# Patient Record
Sex: FEMALE | Race: WHITE
[De-identification: names, ages, dates, MRNs, and addresses within clinical notes are randomized per-mention and may not be internally consistent; named-entity substitution may affect disease eponyms.]

---

## 2018-07-10 ENCOUNTER — HOSPITAL ENCOUNTER (OUTPATIENT)
Dept: HOSPITAL 53 - M LAB | Age: 28
End: 2018-07-10
Attending: GENERAL PRACTICE
Payer: COMMERCIAL

## 2018-07-10 DIAGNOSIS — E86.0: Primary | ICD-10-CM

## 2018-07-10 LAB
25(OH)D3 SERPL-MCNC: 28.6 NG/ML (ref 30–100)
ALBUMIN/GLOBULIN RATIO: 1.09 (ref 1–1.93)
ALBUMIN: 3.5 GM/DL (ref 3.2–5.2)
ALKALINE PHOSPHATASE: 80 U/L (ref 45–117)
ALT SERPL W P-5'-P-CCNC: 83 U/L (ref 12–78)
AMORPH SED URNS QL MICRO: (no result)
AMYLASE SERPL-CCNC: 118 U/L (ref 25–115)
ANION GAP: 5 MEQ/L (ref 8–16)
APPEARANCE, URINE: (no result)
AST SERPL-CCNC: 26 U/L (ref 7–37)
BACTERIA UR QL AUTO: (no result)
BILIRUBIN, URINE AUTO: NEGATIVE
BILIRUBIN,TOTAL: 0.4 MG/DL (ref 0.2–1)
BLOOD UREA NITROGEN: 34 MG/DL (ref 7–18)
BLOOD, URINE BLOOD: NEGATIVE
CALCIUM LEVEL: 10.4 MG/DL (ref 8.5–10.1)
CARBON DIOXIDE LEVEL: 37 MEQ/L (ref 21–32)
CHLORIDE LEVEL: 92 MEQ/L (ref 98–107)
CHOLEST SERPL-MCNC: 185 MG/DL (ref ?–200)
CHOLESTEROL RISK RATIO: 2.72 (ref ?–5)
CONTROL LINE HCG: (no result)
CREATININE FOR GFR: 1.99 MG/DL (ref 0.55–1.3)
FREE T3: 2.1 PG/ML (ref 2.2–4)
FREE T4: 1.08 NG/DL (ref 0.76–1.46)
GFR SERPL CREATININE-BSD FRML MDRD: 32 ML/MIN/{1.73_M2} (ref 60–?)
GLUCOSE, FASTING: 298 MG/DL (ref 70–100)
GLUCOSE, URINE (UA) AUTO: (no result) MG/DL
HCG, SERUM QUALITATIVE: NEGATIVE
HDLC SERPL-MCNC: 68 MG/DL (ref 40–?)
HEMATOCRIT: 27.9 % (ref 36–47)
HEMOGLOBIN: 9.7 G/DL (ref 12–15.5)
KETONE, URINE AUTO: NEGATIVE MG/DL
LDL CHOLESTEROL: 94 MG/DL (ref ?–100)
LEUKOCYTE ESTERASE UR QL STRIP.AUTO: (no result)
MAGNESIUM LEVEL: 1.8 MG/DL (ref 1.8–2.4)
MEAN CORPUSCULAR HEMOGLOBIN: 31.2 PG (ref 27–33)
MEAN CORPUSCULAR HGB CONC: 34.8 G/DL (ref 32–36.5)
MEAN CORPUSCULAR VOLUME: 89.7 FL (ref 80–96)
NITRITE, URINE AUTO: POSITIVE
NONHDLC SERPL-MCNC: 117 MG/DL
NRBC BLD AUTO-RTO: 0 % (ref 0–0)
PH,URINE: 8 UNITS (ref 5–9)
PHOSPHORUS LEVEL: 5.2 MG/DL (ref 2.5–4.9)
PLATELET COUNT, AUTOMATED: 381 10^3/UL (ref 150–450)
POTASSIUM SERUM: 4.4 MEQ/L (ref 3.5–5.1)
PROT UR QL STRIP.AUTO: NEGATIVE MG/DL
RBC, URINE AUTO: 67 /HPF (ref 0–3)
RED BLOOD COUNT: 3.11 10^6/UL (ref 4–5.4)
RED CELL DISTRIBUTION WIDTH: 11.2 % (ref 11.5–14.5)
SODIUM LEVEL: 134 MEQ/L (ref 136–145)
SPECIFIC GRAVITY URINE AUTO: 1.01 (ref 1–1.03)
SQUAMOUS #/AREA URNS AUTO: 0 /HPF (ref 0–6)
THYROID STIMULATING HORMONE: 3.63 UIU/ML (ref 0.36–3.74)
TOTAL PROTEIN: 6.7 GM/DL (ref 6.4–8.2)
TRIGLYCERIDES LEVEL: 115 MG/DL (ref ?–150)
UROBILINOGEN, URINE AUTO: 0.2 MG/DL (ref 0–2)
WBC, URINE AUTO: 147 /HPF (ref 0–3)
WHITE BLOOD COUNT: 6.4 10^3/UL (ref 4–10)

## 2018-07-10 PROCEDURE — 93005 ELECTROCARDIOGRAM TRACING: CPT

## 2018-07-16 LAB — SUMMARY: (no result)

## 2018-09-21 ENCOUNTER — HOSPITAL ENCOUNTER (OUTPATIENT)
Dept: HOSPITAL 53 - M SFHCPLAZ | Age: 28
End: 2018-09-21
Attending: NURSE PRACTITIONER
Payer: COMMERCIAL

## 2018-09-21 DIAGNOSIS — D64.9: ICD-10-CM

## 2018-09-21 DIAGNOSIS — M06.9: Primary | ICD-10-CM

## 2018-09-21 DIAGNOSIS — E10.65: ICD-10-CM

## 2018-09-21 LAB
ALBUMIN/GLOBULIN RATIO: 1.06 (ref 1–1.93)
ALBUMIN: 3.6 GM/DL (ref 3.2–5.2)
ALKALINE PHOSPHATASE: 83 U/L (ref 45–117)
ALT SERPL W P-5'-P-CCNC: 54 U/L (ref 12–78)
ANION GAP: 10 MEQ/L (ref 8–16)
AST SERPL-CCNC: 26 U/L (ref 7–37)
BILIRUBIN,TOTAL: 0.3 MG/DL (ref 0.2–1)
BLOOD UREA NITROGEN: 21 MG/DL (ref 7–18)
CALCIUM LEVEL: 9.1 MG/DL (ref 8.5–10.1)
CARBON DIOXIDE LEVEL: 33 MEQ/L (ref 21–32)
CHLORIDE LEVEL: 93 MEQ/L (ref 98–107)
CREAT UR-MCNC: 44.2 MG/DL
CREATININE FOR GFR: 2.22 MG/DL (ref 0.55–1.3)
EST. AVERAGE GLUCOSE BLD GHB EST-MCNC: 352 MG/DL (ref 60–110)
FERRITIN: 47 NG/ML (ref 8–252)
GFR SERPL CREATININE-BSD FRML MDRD: 28.2 ML/MIN/{1.73_M2} (ref 60–?)
GLUCOSE, FASTING: 192 MG/DL (ref 70–100)
HEMATOCRIT: 26.4 % (ref 36–47)
HEMOGLOBIN: 9.1 G/DL (ref 12–15.5)
IRON (FE): 28 UG/DL (ref 50–170)
IRON SATN MFR SERPL: 9.2 % (ref 13.2–45)
MEAN CORPUSCULAR HEMOGLOBIN: 31.9 PG (ref 27–33)
MEAN CORPUSCULAR HGB CONC: 34.5 G/DL (ref 32–36.5)
MEAN CORPUSCULAR VOLUME: 92.6 FL (ref 80–96)
MICROALBUMIN UR-MCNC: 32.5 MG/L
MICROALBUMIN/CREAT UR: 73.5 MCG/MG (ref 0–30)
NRBC BLD AUTO-RTO: 0 % (ref 0–0)
PLATELET COUNT, AUTOMATED: 406 10^3/UL (ref 150–450)
POTASSIUM SERUM: 4.7 MEQ/L (ref 3.5–5.1)
RED BLOOD COUNT: 2.85 10^6/UL (ref 4–5.4)
RED CELL DISTRIBUTION WIDTH: 12.4 % (ref 11.5–14.5)
RHEUMATOID FACTOR QUANT: 37.1 IU/ML (ref ?–15)
SODIUM LEVEL: 136 MEQ/L (ref 136–145)
TOTAL IRON BINDING CAPACITY: 303 UG/DL (ref 250–450)
TOTAL PROTEIN: 7 GM/DL (ref 6.4–8.2)
WHITE BLOOD COUNT: 7 10^3/UL (ref 4–10)

## 2018-10-08 ENCOUNTER — HOSPITAL ENCOUNTER (OUTPATIENT)
Dept: HOSPITAL 53 - M RAD | Age: 28
End: 2018-10-08
Attending: INTERNAL MEDICINE
Payer: COMMERCIAL

## 2018-10-08 DIAGNOSIS — N18.4: Primary | ICD-10-CM

## 2018-10-08 PROCEDURE — 76775 US EXAM ABDO BACK WALL LIM: CPT

## 2018-10-26 ENCOUNTER — HOSPITAL ENCOUNTER (OUTPATIENT)
Dept: HOSPITAL 53 - M SFHCPLAZ | Age: 28
End: 2018-10-26
Attending: NURSE PRACTITIONER
Payer: COMMERCIAL

## 2018-10-26 ENCOUNTER — HOSPITAL ENCOUNTER (OUTPATIENT)
Dept: HOSPITAL 53 - M LAB REF | Age: 28
End: 2018-10-26
Attending: NURSE PRACTITIONER
Payer: COMMERCIAL

## 2018-10-26 DIAGNOSIS — R19.7: Primary | ICD-10-CM

## 2018-10-26 LAB
ANION GAP: 10 MEQ/L (ref 8–16)
BLOOD UREA NITROGEN: 22 MG/DL (ref 7–18)
CALCIUM LEVEL: 10.2 MG/DL (ref 8.5–10.1)
CARBON DIOXIDE LEVEL: 35 MEQ/L (ref 21–32)
CHLORIDE LEVEL: 92 MEQ/L (ref 98–107)
CREATININE FOR GFR: 2.56 MG/DL (ref 0.55–1.3)
GFR SERPL CREATININE-BSD FRML MDRD: 23.9 ML/MIN/{1.73_M2} (ref 60–?)
GLUCOSE, FASTING: 267 MG/DL (ref 70–100)
POTASSIUM SERUM: 4.1 MEQ/L (ref 3.5–5.1)
SODIUM LEVEL: 137 MEQ/L (ref 136–145)

## 2018-10-26 PROCEDURE — 86255 FLUORESCENT ANTIBODY SCREEN: CPT

## 2018-10-26 PROCEDURE — 87507 IADNA-DNA/RNA PROBE TQ 12-25: CPT

## 2018-10-28 LAB
IMMUNOGLOBULIN A: 200 MG/DL (ref 87–352)
T-TRANSGLUTAMINASE(TTG) IGA: <2 U/ML (ref 0–3)
T-TRANSGLUTAMINASE(TTG) IGG: <2 U/ML (ref 0–5)

## 2018-11-07 ENCOUNTER — HOSPITAL ENCOUNTER (OUTPATIENT)
Dept: HOSPITAL 53 - M LRY | Age: 28
End: 2018-11-07
Attending: INTERNAL MEDICINE
Payer: COMMERCIAL

## 2018-11-07 ENCOUNTER — HOSPITAL ENCOUNTER (OUTPATIENT)
Dept: HOSPITAL 53 - M SFHCLERA | Age: 28
End: 2018-11-07
Attending: INTERNAL MEDICINE
Payer: COMMERCIAL

## 2018-11-07 DIAGNOSIS — M85.842: ICD-10-CM

## 2018-11-07 DIAGNOSIS — M05.79: Primary | ICD-10-CM

## 2018-11-07 DIAGNOSIS — M06.9: Primary | ICD-10-CM

## 2018-11-07 DIAGNOSIS — M85.841: ICD-10-CM

## 2018-11-07 DIAGNOSIS — M06.9: ICD-10-CM

## 2018-11-07 LAB
CRP SERPL-MCNC: < 0.3 MG/DL (ref 0–0.3)
ERYTHROCYTE SEDIMENTATION RATE: 81 MM/HR (ref 0–20)

## 2018-11-07 PROCEDURE — 86706 HEP B SURFACE ANTIBODY: CPT

## 2018-11-07 PROCEDURE — 73130 X-RAY EXAM OF HAND: CPT

## 2018-11-09 LAB
HCV AB SER QL: 0.1 INDEX (ref ?–0.8)
HEPATITIS B SURFACE ANTIBODY: POSITIVE
HEPATITIS B SURFACE ANTIGEN: NEGATIVE

## 2018-11-11 LAB
G6PD3: 302 (ref 146–376)
RBC # BLD AUTO: 2.84 X10E6/UL (ref 3.77–5.28)

## 2019-01-11 ENCOUNTER — HOSPITAL ENCOUNTER (OUTPATIENT)
Dept: HOSPITAL 53 - M SFHCPLAZ | Age: 29
End: 2019-01-11
Attending: NURSE PRACTITIONER
Payer: COMMERCIAL

## 2019-01-11 DIAGNOSIS — F50.2: Primary | ICD-10-CM

## 2019-01-11 LAB
ALBUMIN SERPL BCG-MCNC: 3.7 GM/DL (ref 3.2–5.2)
ALT SERPL W P-5'-P-CCNC: 39 U/L (ref 12–78)
B-HCG SERPL QL: NEGATIVE
BASOPHILS # BLD AUTO: 0.1 10^3/UL (ref 0–0.2)
BASOPHILS NFR BLD AUTO: 0.9 % (ref 0–1)
BILIRUB SERPL-MCNC: 0.3 MG/DL (ref 0.2–1)
BUN SERPL-MCNC: 22 MG/DL (ref 7–18)
C3 SERPL-MCNC: 101 MG/DL (ref 90–180)
C4 SERPL-MCNC: 24 MG/DL (ref 10–40)
CALCIUM SERPL-MCNC: 9.3 MG/DL (ref 8.5–10.1)
CHLORIDE SERPL-SCNC: 93 MEQ/L (ref 98–107)
CHOLEST SERPL-MCNC: 175 MG/DL (ref ?–200)
CHOLEST/HDLC SERPL: 2.3 {RATIO} (ref ?–5)
CO2 SERPL-SCNC: 35 MEQ/L (ref 21–32)
CREAT SERPL-MCNC: 3.32 MG/DL (ref 0.55–1.3)
CRP SERPL-MCNC: < 0.3 MG/DL (ref 0–0.3)
EOSINOPHIL # BLD AUTO: 0.4 10^3/UL (ref 0–0.5)
EOSINOPHIL NFR BLD AUTO: 5.7 % (ref 0–3)
GFR SERPL CREATININE-BSD FRML MDRD: 17.7 ML/MIN/{1.73_M2} (ref 60–?)
GLUCOSE SERPL-MCNC: 162 MG/DL (ref 70–100)
HCT VFR BLD AUTO: 25.2 % (ref 36–47)
HDLC SERPL-MCNC: 76 MG/DL (ref 40–?)
HGB BLD-MCNC: 8.7 G/DL (ref 12–15.5)
HIV 1+2 AB+HIV1 P24 AG SERPL QL IA: NEGATIVE
IGA SERPL-MCNC: 190 MG/DL (ref 70–400)
IGG SERPL-MCNC: 888 MG/DL (ref 681–1648)
IGM SERPL-MCNC: 62.6 MG/DL (ref 40–230)
LDLC SERPL CALC-MCNC: 75 MG/DL (ref ?–100)
LYMPHOCYTES # BLD AUTO: 2.6 10^3/UL (ref 1.5–6.5)
LYMPHOCYTES NFR BLD AUTO: 36.9 % (ref 24–44)
MAGNESIUM SERPL-MCNC: 2.6 MG/DL (ref 1.8–2.4)
MCH RBC QN AUTO: 31.3 PG (ref 27–33)
MCHC RBC AUTO-ENTMCNC: 34.5 G/DL (ref 32–36.5)
MCV RBC AUTO: 90.6 FL (ref 80–96)
MONOCYTES # BLD AUTO: 0.5 10^3/UL (ref 0–0.8)
MONOCYTES NFR BLD AUTO: 7 % (ref 0–5)
NEUTROPHILS # BLD AUTO: 3.5 10^3/UL (ref 1.8–7.7)
NEUTROPHILS NFR BLD AUTO: 49.2 % (ref 36–66)
NONHDLC SERPL-MCNC: 99 MG/DL
PHOSPHATE SERPL-MCNC: 2.8 MG/DL (ref 2.5–4.9)
PLATELET # BLD AUTO: 488 10^3/UL (ref 150–450)
POTASSIUM SERPL-SCNC: 3.7 MEQ/L (ref 3.5–5.1)
PROT SERPL-MCNC: 7.1 GM/DL (ref 6.4–8.2)
RBC # BLD AUTO: 2.78 10^6/UL (ref 4–5.4)
SODIUM SERPL-SCNC: 136 MEQ/L (ref 136–145)
TRIGL SERPL-MCNC: 118 MG/DL (ref ?–150)
TSH SERPL DL<=0.005 MIU/L-ACNC: 1.62 UIU/ML (ref 0.36–3.74)
WBC # BLD AUTO: 7 10^3/UL (ref 4–10)

## 2019-01-15 ENCOUNTER — HOSPITAL ENCOUNTER (OUTPATIENT)
Dept: HOSPITAL 53 - M ED | Age: 29
Setting detail: OBSERVATION
LOS: 2 days | Discharge: HOME | End: 2019-01-17
Attending: INTERNAL MEDICINE | Admitting: HOSPITALIST
Payer: COMMERCIAL

## 2019-01-15 VITALS — DIASTOLIC BLOOD PRESSURE: 97 MMHG | SYSTOLIC BLOOD PRESSURE: 140 MMHG

## 2019-01-15 VITALS — DIASTOLIC BLOOD PRESSURE: 91 MMHG | SYSTOLIC BLOOD PRESSURE: 135 MMHG

## 2019-01-15 VITALS — WEIGHT: 104.28 LBS | BODY MASS INDEX: 20.47 KG/M2 | HEIGHT: 60 IN

## 2019-01-15 DIAGNOSIS — N17.9: Primary | ICD-10-CM

## 2019-01-15 DIAGNOSIS — Z79.899: ICD-10-CM

## 2019-01-15 DIAGNOSIS — E86.0: ICD-10-CM

## 2019-01-15 DIAGNOSIS — E83.52: ICD-10-CM

## 2019-01-15 DIAGNOSIS — E10.9: ICD-10-CM

## 2019-01-15 DIAGNOSIS — N39.0: ICD-10-CM

## 2019-01-15 DIAGNOSIS — Z86.59: ICD-10-CM

## 2019-01-15 DIAGNOSIS — D50.9: ICD-10-CM

## 2019-01-15 DIAGNOSIS — M06.9: ICD-10-CM

## 2019-01-15 DIAGNOSIS — N18.3: ICD-10-CM

## 2019-01-15 LAB
ALBUMIN SERPL BCG-MCNC: 3 GM/DL (ref 3.2–5.2)
ALBUMIN SERPL BCG-MCNC: 4 GM/DL (ref 3.2–5.2)
ALT SERPL W P-5'-P-CCNC: 41 U/L (ref 12–78)
AMYLASE SERPL-CCNC: 114 U/L (ref 25–115)
APTT BLD: 25.6 SECONDS (ref 25.4–37.6)
BASOPHILS # BLD AUTO: 0.1 10^3/UL (ref 0–0.2)
BASOPHILS NFR BLD AUTO: 0.8 % (ref 0–1)
BILIRUB CONJ SERPL-MCNC: 0.1 MG/DL (ref 0–0.2)
BILIRUB SERPL-MCNC: 0.3 MG/DL (ref 0.2–1)
BUN SERPL-MCNC: 22 MG/DL (ref 7–18)
BUN SERPL-MCNC: 26 MG/DL (ref 7–18)
CALCIUM SERPL-MCNC: 11.2 MG/DL (ref 8.5–10.1)
CALCIUM SERPL-MCNC: 9.1 MG/DL (ref 8.5–10.1)
CCP IGG SERPL-ACNC: 18 UNITS (ref 0–19)
CHLORIDE SERPL-SCNC: 91 MEQ/L (ref 98–107)
CHLORIDE SERPL-SCNC: 95 MEQ/L (ref 98–107)
CK MB CFR.DF SERPL CALC: 2.33
CK MB SERPL-MCNC: < 1 NG/ML (ref ?–3.6)
CK SERPL-CCNC: 43 U/L (ref 26–192)
CO2 SERPL-SCNC: 33 MEQ/L (ref 21–32)
CO2 SERPL-SCNC: 33 MEQ/L (ref 21–32)
CREAT SERPL-MCNC: 2.65 MG/DL (ref 0.55–1.3)
CREAT SERPL-MCNC: 3.12 MG/DL (ref 0.55–1.3)
CREAT UR-MCNC: 26.1 MG/DL
CREAT UR-MCNC: 55.9 MG/DL (ref 20–300)
EOSINOPHIL # BLD AUTO: 0.6 10^3/UL (ref 0–0.5)
EOSINOPHIL NFR BLD AUTO: 6.9 % (ref 0–3)
GFR SERPL CREATININE-BSD FRML MDRD: 19 ML/MIN/{1.73_M2} (ref 60–?)
GFR SERPL CREATININE-BSD FRML MDRD: 23 ML/MIN/{1.73_M2} (ref 60–?)
GLUCOSE SERPL-MCNC: 266 MG/DL (ref 70–100)
GLUCOSE SERPL-MCNC: 347 MG/DL (ref 70–100)
HCT VFR BLD AUTO: 29 % (ref 36–47)
HGB BLD-MCNC: 10.1 G/DL (ref 12–15.5)
IGD SER-MCNC: <1.34 MG/DL (ref ?–14.11)
INR PPP: 0.89
LIPASE SERPL-CCNC: 139 U/L (ref 73–393)
LYMPHOCYTES # BLD AUTO: 3 10^3/UL (ref 1.5–6.5)
LYMPHOCYTES NFR BLD AUTO: 32.5 % (ref 24–44)
MCH RBC QN AUTO: 30.9 PG (ref 27–33)
MCHC RBC AUTO-ENTMCNC: 34.8 G/DL (ref 32–36.5)
MCV RBC AUTO: 88.7 FL (ref 80–96)
MONOCYTES # BLD AUTO: 0.6 10^3/UL (ref 0–0.8)
MONOCYTES NFR BLD AUTO: 6.4 % (ref 0–5)
NEUTROPHILS # BLD AUTO: 4.9 10^3/UL (ref 1.8–7.7)
NEUTROPHILS NFR BLD AUTO: 53.1 % (ref 36–66)
PHOSPHATE SERPL-MCNC: 2.6 MG/DL (ref 2.5–4.9)
PHOSPHATE SERPL-MCNC: 3.1 MG/DL (ref 2.5–4.9)
PLATELET # BLD AUTO: 460 10^3/UL (ref 150–450)
POTASSIUM SERPL-SCNC: 3.5 MEQ/L (ref 3.5–5.1)
POTASSIUM SERPL-SCNC: 3.8 MEQ/L (ref 3.5–5.1)
PROT SERPL-MCNC: 8.1 GM/DL (ref 6.4–8.2)
PROT UR-MCNC: 38.8 MG/DL (ref 0–12)
PROTHROMBIN TIME: 12.1 SECONDS (ref 12.1–14.4)
RBC # BLD AUTO: 3.27 10^6/UL (ref 4–5.4)
SODIUM SERPL-SCNC: 134 MEQ/L (ref 136–145)
SODIUM SERPL-SCNC: 135 MEQ/L (ref 136–145)
TROPONIN I SERPL-MCNC: < 0.02 NG/ML (ref ?–0.1)
WBC # BLD AUTO: 9.2 10^3/UL (ref 4–10)

## 2019-01-15 PROCEDURE — 87088 URINE BACTERIA CULTURE: CPT

## 2019-01-15 PROCEDURE — 86850 RBC ANTIBODY SCREEN: CPT

## 2019-01-15 PROCEDURE — 82746 ASSAY OF FOLIC ACID SERUM: CPT

## 2019-01-15 PROCEDURE — 82553 CREATINE MB FRACTION: CPT

## 2019-01-15 PROCEDURE — 82607 VITAMIN B-12: CPT

## 2019-01-15 PROCEDURE — 96374 THER/PROPH/DIAG INJ IV PUSH: CPT

## 2019-01-15 PROCEDURE — 93005 ELECTROCARDIOGRAM TRACING: CPT

## 2019-01-15 PROCEDURE — 80048 BASIC METABOLIC PNL TOTAL CA: CPT

## 2019-01-15 PROCEDURE — 82550 ASSAY OF CK (CPK): CPT

## 2019-01-15 PROCEDURE — 85027 COMPLETE CBC AUTOMATED: CPT

## 2019-01-15 PROCEDURE — 82570 ASSAY OF URINE CREATININE: CPT

## 2019-01-15 PROCEDURE — 85025 COMPLETE CBC W/AUTO DIFF WBC: CPT

## 2019-01-15 PROCEDURE — 83735 ASSAY OF MAGNESIUM: CPT

## 2019-01-15 PROCEDURE — 36430 TRANSFUSION BLD/BLD COMPNT: CPT

## 2019-01-15 PROCEDURE — 36415 COLL VENOUS BLD VENIPUNCTURE: CPT

## 2019-01-15 PROCEDURE — 83690 ASSAY OF LIPASE: CPT

## 2019-01-15 PROCEDURE — 85046 RETICYTE/HGB CONCENTRATE: CPT

## 2019-01-15 PROCEDURE — 84443 ASSAY THYROID STIM HORMONE: CPT

## 2019-01-15 PROCEDURE — 85730 THROMBOPLASTIN TIME PARTIAL: CPT

## 2019-01-15 PROCEDURE — 86920 COMPATIBILITY TEST SPIN: CPT

## 2019-01-15 PROCEDURE — 86900 BLOOD TYPING SEROLOGIC ABO: CPT

## 2019-01-15 PROCEDURE — 83010 ASSAY OF HAPTOGLOBIN QUANT: CPT

## 2019-01-15 PROCEDURE — 80069 RENAL FUNCTION PANEL: CPT

## 2019-01-15 PROCEDURE — 71045 X-RAY EXAM CHEST 1 VIEW: CPT

## 2019-01-15 PROCEDURE — 87186 SC STD MICRODIL/AGAR DIL: CPT

## 2019-01-15 PROCEDURE — 96376 TX/PRO/DX INJ SAME DRUG ADON: CPT

## 2019-01-15 PROCEDURE — 82150 ASSAY OF AMYLASE: CPT

## 2019-01-15 PROCEDURE — 87040 BLOOD CULTURE FOR BACTERIA: CPT

## 2019-01-15 PROCEDURE — 82728 ASSAY OF FERRITIN: CPT

## 2019-01-15 PROCEDURE — 76775 US EXAM ABDO BACK WALL LIM: CPT

## 2019-01-15 PROCEDURE — 83605 ASSAY OF LACTIC ACID: CPT

## 2019-01-15 PROCEDURE — 84156 ASSAY OF PROTEIN URINE: CPT

## 2019-01-15 PROCEDURE — 99285 EMERGENCY DEPT VISIT HI MDM: CPT

## 2019-01-15 PROCEDURE — 83550 IRON BINDING TEST: CPT

## 2019-01-15 PROCEDURE — 86901 BLOOD TYPING SEROLOGIC RH(D): CPT

## 2019-01-15 PROCEDURE — 96361 HYDRATE IV INFUSION ADD-ON: CPT

## 2019-01-15 PROCEDURE — 93041 RHYTHM ECG TRACING: CPT

## 2019-01-15 PROCEDURE — 84100 ASSAY OF PHOSPHORUS: CPT

## 2019-01-15 PROCEDURE — 85610 PROTHROMBIN TIME: CPT

## 2019-01-15 PROCEDURE — 81001 URINALYSIS AUTO W/SCOPE: CPT

## 2019-01-15 PROCEDURE — 83615 LACTATE (LD) (LDH) ENZYME: CPT

## 2019-01-15 RX ADMIN — SODIUM CHLORIDE SCH MLS/HR: 9 INJECTION, SOLUTION INTRAVENOUS at 14:47

## 2019-01-15 RX ADMIN — ACETAMINOPHEN PRN MG: 325 TABLET ORAL at 23:20

## 2019-01-15 RX ADMIN — SODIUM CHLORIDE SCH MLS/HR: 9 INJECTION, SOLUTION INTRAVENOUS at 21:14

## 2019-01-15 NOTE — HPE
DATE OF ADMISSION:  01/15/2019

 

28-year-old female with past medical history of bulimia nervosa, rheumatoid

arthritis, type 1 diabetes, chronic kidney disease (CKD) IIIB, who presents to

the emergency room from her primary medical doctor's office for abnormal labs,

apparently the creatinine was elevated, upon coming to the emergency room (ER)

the patient was found to have a creatinine of 3.1, which is above her baseline,

she normally runs around 2.2.  She is followed by nephrology as an outpatient.

He is also incidentally found to have mild hypercalcemia, which she was started

on IV hydration, and also a urinary tract infection (UTI), which she does not

have any symptoms of.  She denies any urgency, frequency, or dysuria.  She has no

subjective bleeding, fever, aches, or chills.  She does have mild generalized

abdominal pain, but again no nausea or vomiting, and her bulimic episodes have

not changed according to her.  She was started on IV Rocephin 1 gram and will be

admitted for further management.

 

Again, past medical history of CKD IIIB, history of rheumatoid arthritis, history

of type 1 diabetes, history of bulimia nervosa, history of herpes simplex,

anemia, vitamin D deficiency.

 

ALLERGIES:  No known drug allergies.

 

FAMILY HISTORY:  Noncontributory.

 

SOCIAL HISTORY:  She denies tobacco or alcohol, but does occasionally smoke

marijuana.

 

MEDICATIONS:  She takes at home are as follows:

- Tylenol as needed

- Admelog SoloStar 1 unit/35 carbohydrates

- Basaglar KwikPen 12 units subcutaneous daily

- multivitamin one tablet orally daily

- high potency iron 325 mg orally daily

- ranitidine 150 mg orally daily

- sulfasalazine 500 mg orally twice daily

 

REVIEW OF SYSTEMS:  Negative for all ten major systems except what is mentioned

in history of present illness (HPI).

 

VITAL SIGNS:  Blood pressure is 134/97, pulse 97, temperature 98.4, oxygen

saturation 94% on room air.

HEAD:  Atraumatic, normocephalic.

NECK:  Supple, no jugular venous distention (JVD).

LUNGS:  Clear to auscultation.

S1, S2 audible.

ABDOMEN:  Soft, positive bowel sounds.

NEUROLOGIC EXAMINATION:  Patient awake, alert, oriented times three.

 

LABORATORY DATA:  WBC 9.2, hemoglobin 10.1, hematocrit 29, platelets are 460,000,

sodium 135, potassium 3.8, chloride 91, CO2 33, anion gap is 11, BUN is 26,

creatinine is 3.12, lactic acid 3.5, calcium 11.2, magnesium 2.3, troponin is

less than 0.02, lipase is 139.

 

Urinalysis is positive for UTI.

 

Renal ultrasound was negative for any obstruction or hydronephrosis.

 

Chest x-ray showed no active disease.

 

IMPRESSION:

1.  Acute kidney injury (BRENDAN).

2.  Urinary tract infection (UTI).

3.  Hypercalcemia.

 

PLAN:  Patient is to be admitted to medical/surgical floor.  I believe the acute

kidney injury (BRENDAN) is secondary to dehydration.  Patient is bulimic and I feel

this is likely secondary to that, but we can also say that her urinary tract

infection (UTI) may have contributed to her decreased appetite as well.  She is

also hypercalcemic of unknown etiology and at that level it could also manifest

as poor appetite, so I feel this combination is the cause of her acute kidney

injury (BRENDAN) on chronic kidney disease (CKD).  So, we will be hydrating her, and

that is at 125 mL/hour.  We will monitor BUN and creatinine trends.  As far as

urinary tract infection (UTI) is concerned, cultures have been sent out, will

follow with cultures and sensitivities, and in meantime continue Rocephin at 1

gram daily.  As far as the hypercalcemia is concerned, will send intact PTH,

25-hydroxyvitamin D levels, and 1,25-dihydroxyvitamin D levels, hydrate her, and

follow her calcium trends.  Nephrology also has been consulted, will be awaiting

their recommendations.

## 2019-01-15 NOTE — REP
Portable chest:  Single view.

 

History:  Renal failure.

 

Comparison study:  No comparison study.

 

Findings:  EKG monitoring electrodes overlie the chest.  Lungs are well inflated

and clear.  Pleural angles are sharp.  Cardiomediastinal silhouette and bony

thorax are unremarkable.

 

Impression:

 

No active disease.

 

 

Electronically Signed by

Heraclio Gomez MD 01/15/2019 12:10 P

## 2019-01-15 NOTE — REP
Urinary tract sonogram:

 

History:  Renal failure.

 

Comparison: Comparison sonography October 8, 2018 showed no abnormality.

 

Findings:

 

Scanning at the level of the urinary bladder shows slight thickening of the

bladder wall and some mild cellular debris in the bladder.

 

Renal cortical echogenicity pattern is normal bilaterally and contours are

smooth.  There is no evidence of hydronephrosis, cyst, mass, or calculus in

either kidney.

 

The right kidney measures 12.2 x 5.9 x 4.5 cm.

 

Left renal dimensions are 10.8 x 5.0 x 6.4 cm.

 

Impression:

 

Slight bladder wall thickening.  Otherwise negative urinary tract sonography.

 

 

Electronically Signed by

Heraclio Gomez MD 01/15/2019 01:55 P - - -

## 2019-01-16 VITALS — SYSTOLIC BLOOD PRESSURE: 125 MMHG | DIASTOLIC BLOOD PRESSURE: 83 MMHG

## 2019-01-16 VITALS — SYSTOLIC BLOOD PRESSURE: 140 MMHG | DIASTOLIC BLOOD PRESSURE: 90 MMHG

## 2019-01-16 VITALS — DIASTOLIC BLOOD PRESSURE: 72 MMHG | SYSTOLIC BLOOD PRESSURE: 138 MMHG

## 2019-01-16 LAB
BASOPHILS # BLD AUTO: 0 10^3/UL (ref 0–0.2)
BASOPHILS NFR BLD AUTO: 0.6 % (ref 0–1)
BUN SERPL-MCNC: 23 MG/DL (ref 7–18)
CALCIUM SERPL-MCNC: 8.7 MG/DL (ref 8.5–10.1)
CHLORIDE SERPL-SCNC: 101 MEQ/L (ref 98–107)
CO2 SERPL-SCNC: 32 MEQ/L (ref 21–32)
CREAT SERPL-MCNC: 2.63 MG/DL (ref 0.55–1.3)
EOSINOPHIL # BLD AUTO: 0.5 10^3/UL (ref 0–0.5)
EOSINOPHIL NFR BLD AUTO: 7.9 % (ref 0–3)
FERRITIN SERPL-MCNC: 52 NG/ML (ref 8–252)
FOLATE SERPL-MCNC: 11.2 NG/ML (ref 5.4–?)
GFR SERPL CREATININE-BSD FRML MDRD: 23.2 ML/MIN/{1.73_M2} (ref 60–?)
GLUCOSE SERPL-MCNC: 420 MG/DL (ref 70–100)
HCT VFR BLD AUTO: 22.2 % (ref 36–47)
HCT VFR BLD AUTO: 23.8 % (ref 36–47)
HGB BLD-MCNC: 7.5 G/DL (ref 12–15.5)
HGB BLD-MCNC: 8 G/DL (ref 12–15.5)
IRON SATN MFR SERPL: 17.3 % (ref 13.2–45)
IRON SERPL-MCNC: 42 UG/DL (ref 50–170)
LDH SERPL L TO P-CCNC: 108 U/L (ref 84–246)
LYMPHOCYTES # BLD AUTO: 2.7 10^3/UL (ref 1.5–6.5)
LYMPHOCYTES NFR BLD AUTO: 43.2 % (ref 24–44)
MAGNESIUM SERPL-MCNC: 2.1 MG/DL (ref 1.8–2.4)
MCH RBC QN AUTO: 30.7 PG (ref 27–33)
MCH RBC QN AUTO: 31 PG (ref 27–33)
MCHC RBC AUTO-ENTMCNC: 33.6 G/DL (ref 32–36.5)
MCHC RBC AUTO-ENTMCNC: 33.8 G/DL (ref 32–36.5)
MCV RBC AUTO: 91.2 FL (ref 80–96)
MCV RBC AUTO: 91.7 FL (ref 80–96)
MONOCYTES # BLD AUTO: 0.4 10^3/UL (ref 0–0.8)
MONOCYTES NFR BLD AUTO: 5.7 % (ref 0–5)
NEUTROPHILS # BLD AUTO: 2.6 10^3/UL (ref 1.8–7.7)
NEUTROPHILS NFR BLD AUTO: 42.4 % (ref 36–66)
PLATELET # BLD AUTO: 324 10^3/UL (ref 150–450)
PLATELET # BLD AUTO: 377 10^3/UL (ref 150–450)
POTASSIUM SERPL-SCNC: 4.4 MEQ/L (ref 3.5–5.1)
RBC # BLD AUTO: 2.42 10^6/UL (ref 4–5.4)
RBC # BLD AUTO: 2.61 10^6/UL (ref 4–5.4)
SODIUM SERPL-SCNC: 138 MEQ/L (ref 136–145)
TIBC SERPL-MCNC: 243 UG/DL (ref 250–450)
TSH SERPL DL<=0.005 MIU/L-ACNC: 0.8 UIU/ML (ref 0.36–3.74)
VIT B12 SERPL-MCNC: 555 PG/ML (ref 247–911)
WBC # BLD AUTO: 6.2 10^3/UL (ref 4–10)
WBC # BLD AUTO: 6.8 10^3/UL (ref 4–10)

## 2019-01-16 RX ADMIN — MULTIPLE VITAMINS W/ MINERALS TAB SCH TAB: TAB at 08:51

## 2019-01-16 RX ADMIN — INSULIN DETEMIR SCH UNITS: 100 INJECTION, SOLUTION SUBCUTANEOUS at 08:51

## 2019-01-16 RX ADMIN — INSULIN LISPRO SCH UNITS: 100 INJECTION, SOLUTION INTRAVENOUS; SUBCUTANEOUS at 08:50

## 2019-01-16 RX ADMIN — INSULIN LISPRO SCH UNITS: 100 INJECTION, SOLUTION INTRAVENOUS; SUBCUTANEOUS at 08:55

## 2019-01-16 RX ADMIN — ACETAMINOPHEN PRN MG: 325 TABLET ORAL at 12:32

## 2019-01-16 RX ADMIN — SODIUM CHLORIDE SCH MLS/HR: 9 INJECTION, SOLUTION INTRAVENOUS at 04:55

## 2019-01-16 RX ADMIN — INSULIN LISPRO SCH UNITS: 100 INJECTION, SOLUTION INTRAVENOUS; SUBCUTANEOUS at 13:45

## 2019-01-16 RX ADMIN — ACETAMINOPHEN PRN MG: 325 TABLET ORAL at 20:25

## 2019-01-16 RX ADMIN — SODIUM CHLORIDE SCH MLS/HR: 9 INJECTION, SOLUTION INTRAVENOUS at 16:32

## 2019-01-16 RX ADMIN — INSULIN LISPRO SCH UNITS: 100 INJECTION, SOLUTION INTRAVENOUS; SUBCUTANEOUS at 17:30

## 2019-01-16 RX ADMIN — FAMOTIDINE SCH MG: 20 TABLET, FILM COATED ORAL at 08:51

## 2019-01-16 NOTE — CR
DATE OF CONSULTATION:  01/15/2019

 

REQUESTING PHYSICIAN:  Dr. Kristina Mercado.

 

CONSULTING PHYSICIAN:  Dr. Rayo.

 

REASON FOR CONSULTATION:  Management of acute renal failure.

 

CHIEF COMPLAINT:  Patient was sent from the primary care office because of

abnormal labs.

 

HISTORY OF PRESENT ILLNESS:  Shannon Cortez is a 28-year-old female with past

medical history of chronic kidney disease stage III with past baseline 
creatinine

of around 2.2, history of anorexia nervosa and bulimia, rheumatoid arthritis,

diabetes mellitus type 1.  She follows up with Dr. Ariza as outpatient in

nephrology clinic.  She recently got her labs done at primary care office.  She

was found to have acute renal failure with creatinine more than 3.  She was sent

to the emergency room today for further evaluation and management.  Labs done in

the emergency room showed she had a creatinine of 3.1.  She was also in 
metabolic

alkalosis.  Patient was admitted under the hospitalist service.  Nephrology

service was called for further help in the management of acute renal failure.

 

I saw and evaluated the patient today evening at the bedside.  Her grandfather

was present at the bedside and patient reports that because of bulimia, she

purges everyday.  She denies using laxatives at this point.

 

PAST MEDICAL HISTORY:

Chronic kidney disease stage III.

History of rheumatoid arthritis, takes sulfasalazine.

Diabetes mellitus type 1 since 18 years of age.

History of anemia and anorexia nervosa and bulimia.

 

PAST SURGICAL HISTORY:  She denies any surgery but she had a colonoscopy done in

the past because of diarrhea.

 

ALLERGIES: No known drug allergies.

 

FAMILY HISTORY:  No significant family history of end stage renal disease

requiring hemodialysis.

 

SOCIAL HISTORY:  She denies any elicit drug abuse, smoking or alcohol abuse.

Patient is living with her grandparents.  Her own parents live in [place].

 

 

REVIEW OF SYSTEMS:

Constitutional:  Patient reports feeling weak and tired.

Eyes:  She denies any blurry vision, double vision.

ENT:  She denies any dysphagia, odynophagia.

Cardiovascular:  She denies any chest pain or palpitations.

Respiratory:  She denies any shortness of breath.

GI:  She reports anorexia nervosa and bulimia.

Genitourinary:  She denies any dysuria or hematuria.

Musculoskeletal:  She denies any muscle aches and pains.

Skin:  She denies any rashes or ulcers.

Endocrine:  She reports history of diabetes mellitus type 1.

Hematological/oncological:  She denies any easy bleeding or bruising.

Psych:  She reports history of bulimia and anorexia nervosa.

All other review of system is negative.

 

PHYSICAL EXAMINATION:

General:  Patient is awake, alert, oriented times three.  Lying in bed.  Has a

flat affect.

Vital signs:  Temperature is 98.9 degrees Fahrenheit, blood pressure 135/91,

pulse 98, respiratory rate 18, saturating 100% on room air.

Head and neck exam:  Extraocular muscles intact.  Pupils equally round and

reactive to light.  Mucous membranes are moist.  Neck is supple.  There is no

jugular venous distention.

Cardiovascular:  S1, S2.  Regular rate.  No murmur, rubs or gallop.

Respiratory:  Chest is clear to auscultation bilaterally.  Bilateral equal air

entry.  No rales or rhonchi.

Abdomen is soft, positive bowel sounds, nontender, no organomegaly.

Musculoskeletal:  No clubbing or cyanosis. Pulses are 2+.

CNS:  No focal deficit.  Power is 5/5 in all extremities.

Skin:  No rashes or ulcers.

 

LAB REVIEW:  CBC showed WBC 9.2, hemoglobin 10.1, platelets are 460.  Urinalysis

showed it was cloudy with 1+ protein, 3+ glucose, 3+ leukocyte esterase, too

numerous to count WBCs.  Urine random protein is 38.  Random creatinine is 26.

BMP on arrival showed sodium 135, potassium 3.8, chloride 91, bicarbonate 33, 
BUN

is 26, creatinine is 3.1.  Glucose of 347.  Lactic acid was 3.5.  Repeat lactic

acid is 0.8.  Calcium is 11.2.  Albumin 4.  Repeat BMP done in the evening 
showed

sodium 134, potassium 3.5, chloride 95, bicarbonate 33, BUN 22, creatinine 2.6.

Calcium 9.1 and phosphorus 2.6.  Microbiology:  Urine cultures are pending.

 

IMAGING:  Renal ultrasound was done.  It showed slight bladder wall thickening.

Negative urinary tract ultrasound.  Right kidney was 12.2 cm.  Left kidney was

10.8 cm.

 

HOME MEDICATIONS:  Patient's home medications include:

- Tylenol as needed

- Admelog SoloStar pen according to sliding scale

- Basaglar units subcutaneous daily

- iron tablet one tablet daily

- multivitamin one tablet daily

- ranitidine one tablet daily

- sulfasalazine 500 mg by mouth twice a day

- famotidine

 

CURRENT INPATIENT MEDICATIONS:  Patient's inpatient medications include:

- ceftriaxone 1 gram IV daily

- normal saline 125 mg daily

- She was given 500 mL normal saline bolus.

- She is on Tylenol as needed.

- famotidine 50 mg daily

- insulin Levemir 12 units subcutaneous daily and insulin sliding scale

- multivitamin one tablet daily

- sulfasalazine 500 mg by mouth twice a day

 

ASSESSMENT:  28-year-old female with anorexia nervosa and bulimia, diabetes

mellitus type 1, chronic kidney disease stage III at baseline, admitted this 
time

with acute renal failure superimposed on chronic kidney disease, urinary tract

infection and hyperglycemia.

 

PLAN:

1.  Acute renal failure superimposed on chronic kidney disease stage III most

likely secondary to dehydration, volume depletion and urinary tract infection.

Patient has already been started on IV fluid hydration.  She is making good

amount of urine and creatinine is improving.  Continue the IV fluid hydration at

this point.

 

2.  Urinary tract infection. Patient has already been started on IV ceftriaxone.

Cultures are still pending.

 

3.  Metabolic alkalosis.  It is most likely secondary to bulimia and 
dehydration.

Continue IV fluid hydration at this point.

 

4.  Hypocalcemia.  Calcium level is improving with IV fluid hydration.  PTH 
level

is pending.

 

5.  Anemia.  Hemoglobin is 10.1.  She is already on oral iron tablets.  Continue

the home dose of oral iron.

 

6.  Diabetes mellitus type 1.  Continue insulin sliding scale and long acting

insulin.  Glucose levels are controlled.

 

7.  Rheumatoid arthritis.  Okay to continue sulfasalazine at this point.

 

Thank you for involving me in the case of this patient.  I shall be happy to

follow the patient along with you tomorrow morning.

[facility]

## 2019-01-16 NOTE — IPN
DATE: 01/16/2019

 

SUBJECTIVE: Patient is seen and examined in the room with her grandfather and

grandmother. Patient was frustrated that she needed to come to the hospital and

she does not like the situation. According to patient, patient has an appointment

with the bulimia clinic in [place] in a few days.

 

OBJECTIVE:

VITAL SIGNS: Temperature is 97.5, pulse is 85, respiratory rate 18, blood

pressure 125/83, pulse oximetry is 98% in room air.

GENERAL: No sign of acute distress. Patient is alert and awake.

HEENT: Normocephalic, atraumatic. Extraocular motor grossly intact.

CARDIOVASCULAR: Positive S1, S2, regular rate.

LUNGS: Clear to auscultation bilaterally.

ABDOMEN: Soft, nontender. Bowel sounds present.

EXTREMITIES: No edema.

 

LABORATORY DATA: WBC is 6.8, hemoglobin 8, hematocrit 23.8, platelet count is

377.

 

Sodium is 138, potassium 4.4, chloride 101, carbon dioxide 32, BUN 33, creatinine

is 2.63, GFR is 23.2, fasting glucose 420, calcium is 8.7, magnesium 2.1, iron is

42, TIBC is 243, ferritin is 52, lactate dehydrogenase 108.

 

ASSESSMENT AND PLAN:

1. Acute renal failure, on chronic kidney disease (CKD) stage III. Clinically,

patient shows some signs of dehydration. Patient also has a urinary tract

infection. Patient is on IV support. Followup with urine culture. Nephrology

consulted, appreciate their assistance.

 

2. Type 1 diabetes. A long discussion occurred with patient regarding her insulin

regimen. Patient is currently on consistent-carbohydrate diet. Patient will be on

Levemir 12 units before meals and at bedtime. We are supplementing patient's

regimen. Per patient, patient years ago was under better control. We will also

try to contact [facility] regarding patient's home insulin recommendations.

 

3. Rheumatoid arthritis. Patient is on sulfasalazine.

 

4. History of bulimia nervosa. Patient has an appointment with a bulimia clinic

in a few days in [place].

 

5. Anemia. We will continue with anemia workup. Followup with stool occult.

Patient denies any source of active bleeding.

 

6. Urinary tract infection. Followup with urine culture, on IV antibiotics.

 

7. Deep vein thrombosis (DVT) prophylaxis. On thromboembolic-deterrent stockings

(TEDS), compression.

## 2019-01-17 VITALS — SYSTOLIC BLOOD PRESSURE: 135 MMHG | DIASTOLIC BLOOD PRESSURE: 92 MMHG

## 2019-01-17 VITALS — DIASTOLIC BLOOD PRESSURE: 93 MMHG | SYSTOLIC BLOOD PRESSURE: 136 MMHG

## 2019-01-17 LAB
BUN SERPL-MCNC: 18 MG/DL (ref 7–18)
CALCIUM SERPL-MCNC: 8.5 MG/DL (ref 8.5–10.1)
CHLORIDE SERPL-SCNC: 108 MEQ/L (ref 98–107)
CO2 SERPL-SCNC: 29 MEQ/L (ref 21–32)
CREAT SERPL-MCNC: 2.3 MG/DL (ref 0.55–1.3)
GFR SERPL CREATININE-BSD FRML MDRD: 27.1 ML/MIN/{1.73_M2} (ref 60–?)
GLUCOSE SERPL-MCNC: 78 MG/DL (ref 70–100)
HCT VFR BLD AUTO: 20.3 % (ref 36–47)
HGB BLD-MCNC: 7 G/DL (ref 12–15.5)
MAGNESIUM SERPL-MCNC: 2.2 MG/DL (ref 1.8–2.4)
MCH RBC QN AUTO: 31.3 PG (ref 27–33)
MCHC RBC AUTO-ENTMCNC: 34.5 G/DL (ref 32–36.5)
MCV RBC AUTO: 90.6 FL (ref 80–96)
PLATELET # BLD AUTO: 307 10^3/UL (ref 150–450)
POTASSIUM SERPL-SCNC: 4.3 MEQ/L (ref 3.5–5.1)
RBC # BLD AUTO: 2.24 10^6/UL (ref 4–5.4)
SODIUM SERPL-SCNC: 141 MEQ/L (ref 136–145)
WBC # BLD AUTO: 6 10^3/UL (ref 4–10)

## 2019-01-17 RX ADMIN — INSULIN DETEMIR SCH UNITS: 100 INJECTION, SOLUTION SUBCUTANEOUS at 09:00

## 2019-01-17 RX ADMIN — ACETAMINOPHEN PRN MG: 325 TABLET ORAL at 09:23

## 2019-01-17 RX ADMIN — INSULIN LISPRO SCH UNITS: 100 INJECTION, SOLUTION INTRAVENOUS; SUBCUTANEOUS at 07:40

## 2019-01-17 RX ADMIN — FAMOTIDINE SCH MG: 20 TABLET, FILM COATED ORAL at 09:00

## 2019-01-17 RX ADMIN — MULTIPLE VITAMINS W/ MINERALS TAB SCH TAB: TAB at 09:00

## 2019-01-17 RX ADMIN — INSULIN LISPRO SCH UNITS: 100 INJECTION, SOLUTION INTRAVENOUS; SUBCUTANEOUS at 12:29

## 2019-01-17 RX ADMIN — ACETAMINOPHEN PRN MG: 325 TABLET ORAL at 03:12

## 2019-01-17 NOTE — IPN
DATE OF SERVICE:  01/16/2019

 

SUBJECTIVE:  The patient was seen and examined at the bedside today morning.  The

patient is afebrile, hemodynamically stable.  She reports that she is feeling

better today as compared with yesterday.  She continues to be on intravenous (IV)

fluids.  Renal function is improving.  Creatinine is down to 2.6 today.  She has

a good urine output.  Her hemoglobin has dropped to 7.7.  Her grandparents were

also present at the bedside.

 

OBJECTIVE:

Vital signs:  Temperature is 97.5 degrees Fahrenheit, blood pressure 125/83,

pulse is 85, respiratory rate of 18, saturating 98% on room air.

Intake and output:  Urine output recorded is 1.2 liters yesterday, 3.7 liters so

far today since overnight.  Weight in the bed scale is 47.3 kg.

 

PHYSICAL EXAMINATION:

General:  The patient is awake, alert, oriented times three.  Lying in bed.  Weak

and cachectic.  No apparent distress.

Head and neck examination:  Extraocular muscles intact.  Pupils equally round and

reactive to light.  Mucous membranes are moist.  Neck is supple.  There is no

jugular venous distention (JVD).

Cardiovascular:  S1, S2.  Regular rate.  No murmur, rub, and gallop.

Respiratory:  Chest is clear to auscultation bilaterally.  Bilateral equal air

entry.  No rales or rhonchi.

Abdomen is soft, positive bowel sounds, nontender, no organomegaly.

Musculoskeletal:  No clubbing or cyanosis.  The patient has multiple deformities

in the fingers secondary to rheumatoid arthritis..

Central nervous system (CNS):  No focal deficit.  Power is 5/5 in all

extremities.

Skin:  No rashes or ulcers.

 

LABORATORY REVIEW:

Complete blood count (CBC) showed a WBC of 6.8, a repeat hemoglobin is 8.1,

hemoglobin early in the morning was 7.5, platelets are 377.

 

Basic metabolic profile (BMP) showed sodium 138, potassium 4.4, chloride 101,

bicarbonate 32, BUN 23, creatinine is 2.6, glucose level 420, calcium 8.7, iron

is 42, transferrin saturation 17.3, ferritin is 52.

 

MICROBIOLOGY:  Cultures are negative so far.

 

CURRENT INPATIENT MEDICATIONS:  The patient's medications were all reviewed by

me.  She continues to be on intravenous (IV) ceftriaxone.  I have started the

patient on IV Venofer because of iron deficiency.  I have decreased the IV fluid

rate of this patient to 60 mL/h.  No other change in the medications today as

compared with yesterday.

 

ASSESSMENT AND PLAN:

 

1.  Acute kidney injury superimposed on chronic kidney disease stage III.  It was

secondary to dehydration and volume depletion.  The patient is on IV fluid

hydration.  Her urine output is improving.  Creatinine is trending down.

Electrolytes are improving.  The patient has a body mass index (BMI) of 20.4, and

she weighs only 47 kg.  I have decreased the IV fluid rate to 60 mL only.

Continue normal saline at 60 mL/h.

 

2.  Urinary tract infection.  Continue current dose of ceftriaxone.  Cultures are

still pending.

 

3.  Iron-deficiency anemia.  I have started the patient on IV Venofer 400 mg IV

daily times two doses.

 

4.  Hyponatremia.  The patient had hypovolemic hyponatremia.  Sodium is nicely

improved to 138 today.

 

5.  Metabolic alkalosis.  It was secondary to dehydration, volume depletion, and

bulimia nervosa, and purging.  Bicarbonate level is improving with IV fluid

hydration.

 

6.  Diabetes mellitus type 1.  The patient is currently on Levemir and insulin

sliding scale.  Dose adjustment is as per primary team.

 

7.  Rheumatoid arthritis.  Continue current dose of sulfasalazine at this point.

## 2019-01-17 NOTE — IPN
DATE:  01/17/2019

 

SUBJECTIVE: Patient was seen and examined at the bedside. She is afebrile,

hemodynamically stable. Her renal function continues to improve. Creatinine is

down to 2.3. Her hemoglobin has dropped to 7 today. She has a very good urine

output, and patient today is requesting to go back home now.

 

OBJECTIVE: Vital signs: Temperature is 98.1 degrees Fahrenheit, blood pressure

135/92, pulse is 61, respiratory rate of 18, saturating 97% on room air. Intake

and output: Urine output recorded is 3.7 liters yesterday, 1.8 liters so far

today. Weight in the bed scale is not available.

 

PHYSICAL EXAMINATION:

GENERAL: Patient is awake, alert, oriented times three, sitting up in the bed. No

apparent distress.

HEAD AND NECK: Patient appears pale and weak. Otherwise mucous membranes are

moist. Neck is supple. There is no jugular venous distention (JVD).

CARDIOVASCULAR: S1, S2, regular rate. No murmur, rub, or gallop.

RESPIRATORY: Chest is clear to auscultation bilaterally. Bilateral equal air

entry. No rales or rhonchi.

ABDOMEN: Soft. Positive bowel sounds. Nontender. No organomegaly.

MUSCULOSKELETAL: No clubbing or cyanosis. Pulses are 2+. She has deformity of the

hand, fingers because of rheumatoid arthritis.

CENTRAL NERVOUS SYSTEM: No focal deficit. Power is 5/5 in all extremities.

SKIN: No rashes or ulcers.

 

LABORATORY REVIEW:

CBC showed a WBC 6, hemoglobin 7, platelets are 307.

 

A BMP showed sodium 141, potassium 4.3, chloride 108, bicarbonate 29, BUN 18,

creatinine is 2.3, calcium 8.5, magnesium is 2.2.

 

Microbiology: Urine culture done on admission is growing Enterococcus faecalis.

CURRENT INPATIENT MEDICATIONS: Patient's medications were all reviewed by me. She

continues to be intravenous (IV) ceftriaxone. IV fluids have been stopped today.

No other change in the medications today as compared with yesterday.

 

ASSESSMENT AND PLAN:

1. Acute kidney injury superimposed on chronic kidney disease, stage III. It was

secondary to volume depletion and dehydration. She got IV fluid hydration. Her

creatinine is improving back to her baseline. Her baseline creatinine is around

2.2.

 

2. Urinary tract infection. She is growing Enterococcus faecalis. She is

currently on ceftriaxone. She should be discharged home on oral quinolone.

 

3. Iron deficiency anemia. Her hemoglobin has further dropped to 7. She got IV

Venofer, but she is going to get packed red blood cells (PRBC) transfusion before

she goes home.

 

4. Diabetes mellitus, type 1. Patient is on insulin sliding scale and Levemir.

She can restart her home dose according to [facility] when she goes home.

 

5. Rheumatoid arthritis. Okay to continue current dose of sulfasalazine.

 

DISPOSITION: It is okay to discharge the patient from nephrology standpoint after

blood transfusion today.

## 2019-01-19 NOTE — DSES
DATE OF ADMISSION:  01/15/2019

DATE OF DISCHARGE: 01/17/2019

 

CONSULTANTS: Nephrology.

 

PRIMARY CARE PROVIDER:  Christiane Kidd

 

DISCHARGE DIAGNOSES:

1. Acute renal failure on chronic kidney disease stage III.

2. Type 1 diabetes.

3. Rheumatoid arthritis.

4. History of bulimia nervosa.

5. Anemia.

6. Urinary tract infection.

 

HOSPITALIZATION COURSE: The patient is a 28-year-old female, sent into [facility] from primary care provider on 01/15/2019 for abnormal labs. In the

emergency room, the patient was found to have acute renal failure on chronic

kidney disease stage III. Patient admitted under the hospitalist service,

nephrology consulted. The patient was found to have a urinary tract infection,

and the patient was started on empiric antibiotics while waiting for the

cultures. Intravenous (IV) support was initiated for patient's acute renal

failure. Kidney function continued to improve. Patient was found to have

significant iron deficient anemia, iron supplement ordered, blood consent

obtained. Patient was given one packed red blood transfusion. On 01/17/2019, the

patient was determined medically stable for discharge with the recommendation to

followup with her primary care provider in 1 week. The patient also should

followup with the bulimia clinic in [place] at the scheduled time. The

patient was also recommended to finish a course of antibiotics for her urinary

tract infection (UTI).

 

OBJECTIVE:

Vital Signs:  Temperature is 98.9, pulse is 89, respirations 17, blood pressure

136/93, pulse oximetry 93% in room air.

 

LABORATORY DATA: On the day of discharge, WBC is 6, hemoglobin 7, hematocrit

20.3, platelet count 307. Sodium is 141, potassium 4.3, chloride 108, carbon

dioxide 29, BUN 18, creatinine 2.3, GFR is 27.1, fasting glucose is 78, calcium

8.5, magnesium 2.2, iron is 42, TIBC is 243, ferritin 52.

 

Microbiology: Blood culture shows negative after 72 hours times two sets.

Urine culture is positive for Enterococcus faecalis.

 

IMAGING STUDIES:

Chest x-ray on 01/15/2019 demonstrated no acute disease.

 

Renal ultrasound on 01/15/2019 demonstrated slight bladder wall thickening.

Otherwise negative urinary tract sonography.

 

DISCHARGE MEDICATIONS:

- Augmentin 500-125 one tablet by mouth twice a day for 5 days

- Tylenol 650 mg by mouth every 6 hours as needed

- SoloStar one dose subcu per sliding scale

- Basaglar 12 units subcu daily

- iron high potency 325 mg by mouth daily

- multivitamin one tablet by mouth daily

- ranitidine one tablet by mouth daily

- sulfasalazine 500 mg by mouth twice a day

 

DISCHARGE INSTRUCTIONS:

Discontinue line.

Discharge home.

Activity as tolerated.

Diet as tolerated.

Patient was recommended to finish a course of oral antibiotics for UTI.

Patient recommended to followup with primary care provider in 1 week.

Patient also recommended followup with the bulimia clinic in [place].

 

DISCHARGE CONDITION:  Fair.

 

DISCHARGE TIME:  Greater than 30 minutes.

## 2019-01-24 ENCOUNTER — HOSPITAL ENCOUNTER (OUTPATIENT)
Dept: HOSPITAL 53 - M SFHCPLAZ | Age: 29
End: 2019-01-24
Attending: NURSE PRACTITIONER
Payer: COMMERCIAL

## 2019-01-24 DIAGNOSIS — D50.9: Primary | ICD-10-CM

## 2019-01-24 LAB
BUN SERPL-MCNC: 22 MG/DL (ref 7–18)
CALCIUM SERPL-MCNC: 9.1 MG/DL (ref 8.5–10.1)
CHLORIDE SERPL-SCNC: 101 MEQ/L (ref 98–107)
CO2 SERPL-SCNC: 33 MEQ/L (ref 21–32)
CREAT SERPL-MCNC: 2.69 MG/DL (ref 0.55–1.3)
GFR SERPL CREATININE-BSD FRML MDRD: 22.6 ML/MIN/{1.73_M2} (ref 60–?)
GLUCOSE SERPL-MCNC: 252 MG/DL (ref 70–100)
HCT VFR BLD AUTO: 29.7 % (ref 36–47)
HGB BLD-MCNC: 9.8 G/DL (ref 12–15.5)
MCH RBC QN AUTO: 30.9 PG (ref 27–33)
MCHC RBC AUTO-ENTMCNC: 33 G/DL (ref 32–36.5)
MCV RBC AUTO: 93.7 FL (ref 80–96)
PLATELET # BLD AUTO: 448 10^3/UL (ref 150–450)
POTASSIUM SERPL-SCNC: 4.4 MEQ/L (ref 3.5–5.1)
RBC # BLD AUTO: 3.17 10^6/UL (ref 4–5.4)
SODIUM SERPL-SCNC: 140 MEQ/L (ref 136–145)
WBC # BLD AUTO: 7 10^3/UL (ref 4–10)

## 2019-01-25 LAB
ALBUMIN SERPL BCG-MCNC: 3.7 GM/DL (ref 3.2–5.2)
ALT SERPL W P-5'-P-CCNC: 62 U/L (ref 12–78)
B-HCG SERPL QL: NEGATIVE
BILIRUB SERPL-MCNC: 0.3 MG/DL (ref 0.2–1)
BUN SERPL-MCNC: 21 MG/DL (ref 7–18)
CALCIUM SERPL-MCNC: 9.2 MG/DL (ref 8.5–10.1)
CHLORIDE SERPL-SCNC: 103 MEQ/L (ref 98–107)
CO2 SERPL-SCNC: 30 MEQ/L (ref 21–32)
CREAT SERPL-MCNC: 2.75 MG/DL (ref 0.55–1.3)
GFR SERPL CREATININE-BSD FRML MDRD: 22 ML/MIN/{1.73_M2} (ref 60–?)
GLUCOSE SERPL-MCNC: 249 MG/DL (ref 70–100)
MAGNESIUM SERPL-MCNC: 2.3 MG/DL (ref 1.8–2.4)
PHOSPHATE SERPL-MCNC: 3.7 MG/DL (ref 2.5–4.9)
POTASSIUM SERPL-SCNC: 4.4 MEQ/L (ref 3.5–5.1)
PROT SERPL-MCNC: 7 GM/DL (ref 6.4–8.2)
SODIUM SERPL-SCNC: 141 MEQ/L (ref 136–145)
TSH SERPL DL<=0.005 MIU/L-ACNC: 1.79 UIU/ML (ref 0.36–3.74)

## 2019-01-30 ENCOUNTER — HOSPITAL ENCOUNTER (OUTPATIENT)
Dept: HOSPITAL 53 - M SFHCPLAZ | Age: 29
End: 2019-01-30
Attending: NURSE PRACTITIONER
Payer: COMMERCIAL

## 2019-01-30 DIAGNOSIS — F50.2: Primary | ICD-10-CM

## 2019-01-30 LAB
APPEARANCE UR: CLEAR
BACTERIA UR QL AUTO: NEGATIVE
BASOPHILS # BLD AUTO: 0.1 10^3/UL (ref 0–0.2)
BASOPHILS NFR BLD AUTO: 0.8 % (ref 0–1)
BILIRUB UR QL STRIP.AUTO: NEGATIVE
EOSINOPHIL # BLD AUTO: 0.4 10^3/UL (ref 0–0.5)
EOSINOPHIL NFR BLD AUTO: 4.5 % (ref 0–3)
GLUCOSE UR QL STRIP.AUTO: (no result) MG/DL
HCT VFR BLD AUTO: 29.8 % (ref 36–47)
HGB BLD-MCNC: 10.1 G/DL (ref 12–15.5)
HGB UR QL STRIP.AUTO: NEGATIVE
KETONES UR QL STRIP.AUTO: NEGATIVE MG/DL
LEUKOCYTE ESTERASE UR QL STRIP.AUTO: NEGATIVE
LYMPHOCYTES # BLD AUTO: 2.6 10^3/UL (ref 1.5–6.5)
LYMPHOCYTES NFR BLD AUTO: 33.4 % (ref 24–44)
MCH RBC QN AUTO: 31.2 PG (ref 27–33)
MCHC RBC AUTO-ENTMCNC: 33.9 G/DL (ref 32–36.5)
MCV RBC AUTO: 92 FL (ref 80–96)
MONOCYTES # BLD AUTO: 0.5 10^3/UL (ref 0–0.8)
MONOCYTES NFR BLD AUTO: 6.3 % (ref 0–5)
NEUTROPHILS # BLD AUTO: 4.2 10^3/UL (ref 1.8–7.7)
NEUTROPHILS NFR BLD AUTO: 54.9 % (ref 36–66)
NITRITE UR QL STRIP.AUTO: NEGATIVE
PH UR STRIP.AUTO: 9 UNITS (ref 5–9)
PLATELET # BLD AUTO: 401 10^3/UL (ref 150–450)
PROT UR QL STRIP.AUTO: (no result) MG/DL
RBC # BLD AUTO: 3.24 10^6/UL (ref 4–5.4)
RBC # UR AUTO: 2 /HPF (ref 0–3)
SP GR UR STRIP.AUTO: 1.01 (ref 1–1.03)
SQUAMOUS #/AREA URNS AUTO: 1 /HPF (ref 0–6)
UROBILINOGEN UR QL STRIP.AUTO: 0.2 MG/DL (ref 0–2)
WBC # BLD AUTO: 7.7 10^3/UL (ref 4–10)
WBC #/AREA URNS AUTO: 2 /HPF (ref 0–3)

## 2019-04-11 ENCOUNTER — HOSPITAL ENCOUNTER (OUTPATIENT)
Dept: HOSPITAL 53 - M SFHCPLAZ | Age: 29
End: 2019-04-11
Attending: NURSE PRACTITIONER
Payer: COMMERCIAL

## 2019-04-11 DIAGNOSIS — N18.3: ICD-10-CM

## 2019-04-11 DIAGNOSIS — D50.9: ICD-10-CM

## 2019-04-11 DIAGNOSIS — E03.9: Primary | ICD-10-CM

## 2019-04-11 LAB
25(OH)D3 SERPL-MCNC: 33.8 NG/ML (ref 30–100)
ALBUMIN SERPL BCG-MCNC: 3.9 GM/DL (ref 3.2–5.2)
ALT SERPL W P-5'-P-CCNC: 65 U/L (ref 12–78)
BILIRUB SERPL-MCNC: 0.3 MG/DL (ref 0.2–1)
BUN SERPL-MCNC: 27 MG/DL (ref 7–18)
CALCIUM SERPL-MCNC: 9.5 MG/DL (ref 8.5–10.1)
CHLORIDE SERPL-SCNC: 90 MEQ/L (ref 98–107)
CO2 SERPL-SCNC: 41 MEQ/L (ref 21–32)
CREAT SERPL-MCNC: 3.8 MG/DL (ref 0.55–1.3)
FERRITIN SERPL-MCNC: 335 NG/ML (ref 8–252)
GFR SERPL CREATININE-BSD FRML MDRD: 15.1 ML/MIN/{1.73_M2} (ref 60–?)
GLUCOSE SERPL-MCNC: 194 MG/DL (ref 70–100)
HCT VFR BLD AUTO: 27.9 % (ref 36–47)
HGB BLD-MCNC: 9.2 G/DL (ref 12–15.5)
MCH RBC QN AUTO: 29.1 PG (ref 27–33)
MCHC RBC AUTO-ENTMCNC: 33 G/DL (ref 32–36.5)
MCV RBC AUTO: 88.3 FL (ref 80–96)
PLATELET # BLD AUTO: 328 10^3/UL (ref 150–450)
POTASSIUM SERPL-SCNC: 3.7 MEQ/L (ref 3.5–5.1)
PROT SERPL-MCNC: 6.9 GM/DL (ref 6.4–8.2)
RBC # BLD AUTO: 3.16 10^6/UL (ref 4–5.4)
SODIUM SERPL-SCNC: 138 MEQ/L (ref 136–145)
T4 FREE SERPL-MCNC: 1.06 NG/DL (ref 0.76–1.46)
TSH SERPL DL<=0.005 MIU/L-ACNC: 1.76 UIU/ML (ref 0.36–3.74)
WBC # BLD AUTO: 5.3 10^3/UL (ref 4–10)

## 2019-04-23 ENCOUNTER — HOSPITAL ENCOUNTER (OUTPATIENT)
Dept: HOSPITAL 53 - M RAD | Age: 29
End: 2019-04-23
Attending: INTERNAL MEDICINE
Payer: COMMERCIAL

## 2019-04-23 DIAGNOSIS — N18.6: Primary | ICD-10-CM

## 2019-04-23 NOTE — REP
BILATERAL UPPER EXTREMITY DUPLEX DOPPLER ARTERIAL AND VENOUS ULTRASOUND:

 

Bilateral upper extremity duplex Doppler arterial and venous ultrasound performed

for mapping for AV fistula.

 

No deep vein thrombosis is seen bilaterally.

 

On the right basilic vein measures 3 mm at the upper humerus, 2 mm at the mid

humerus, antecubital region and throughout the forearm and 1 mm at the wrist.

Median cubital vein measures 4 mm.  Right cephalic vein measures 3 mm at the

upper humerus, 2 mm at the mid humerus, 3 mm at the antecubital and upper forearm

region, and 2 mm at the mid forearm and wrist.  Right upper extremity arterial

structures demonstrate triphasic waveforms and normal flow velocities.  Right

axillary artery measures 4 mm, brachial artery 3 mm, radial artery 2 mm and ulnar

artery 4 mm.

 

On the left basilic vein measures 4 mm at the upper humerus, 3 mm at the mid

humerus and antecubital region, 2 mm throughout the forearm and wrist.  Median

cubital vein measures 3 mm.  Left cephalic vein measures 2 mm at the upper

humerus, 1 mm at the mid humerus, antecubital region and upper forearm and 2 mm

in the mid forearm and wrist.  Left upper extremity arterial structures

demonstrate normal flow velocities and triphasic waveforms.  Left axillary and

brachial arteries measure 3 mm as does the left ulnar artery.  Left radial artery

measures 2 mm.

 

 

Electronically Signed by

Oswaldo Armstrong MD 04/24/2019 10:26 A

## 2019-08-19 ENCOUNTER — HOSPITAL ENCOUNTER (OUTPATIENT)
Dept: HOSPITAL 53 - M SFHCPLAZ | Age: 29
End: 2019-08-19
Attending: NURSE PRACTITIONER
Payer: COMMERCIAL

## 2019-08-19 DIAGNOSIS — R19.7: Primary | ICD-10-CM

## 2019-09-04 ENCOUNTER — HOSPITAL ENCOUNTER (OUTPATIENT)
Dept: HOSPITAL 53 - M SFHCPLAZ | Age: 29
End: 2019-09-04
Attending: NURSE PRACTITIONER
Payer: COMMERCIAL

## 2019-09-04 DIAGNOSIS — Z01.419: Primary | ICD-10-CM

## 2019-09-06 ENCOUNTER — HOSPITAL ENCOUNTER (OUTPATIENT)
Dept: HOSPITAL 53 - M SFHCPLAZ | Age: 29
End: 2019-09-06
Attending: NURSE PRACTITIONER
Payer: COMMERCIAL

## 2019-09-06 DIAGNOSIS — R39.9: Primary | ICD-10-CM

## 2019-09-06 LAB
APPEARANCE UR: (no result)
BACTERIA UR QL AUTO: (no result)
BILIRUB UR QL STRIP.AUTO: NEGATIVE
GLUCOSE UR QL STRIP.AUTO: (no result) MG/DL
HGB UR QL STRIP.AUTO: (no result)
KETONES UR QL STRIP.AUTO: NEGATIVE MG/DL
LEUKOCYTE ESTERASE UR QL STRIP.AUTO: (no result)
NITRITE UR QL STRIP.AUTO: NEGATIVE
PH UR STRIP.AUTO: 7 UNITS (ref 5–9)
PROT UR QL STRIP.AUTO: (no result) MG/DL
RBC # UR AUTO: 27 /HPF (ref 0–3)
SP GR UR STRIP.AUTO: 1.01 (ref 1–1.03)
SQUAMOUS #/AREA URNS AUTO: 0 /HPF (ref 0–6)
UROBILINOGEN UR QL STRIP.AUTO: 0.2 MG/DL (ref 0–2)
WBC #/AREA URNS AUTO: (no result) /HPF (ref 0–3)

## 2019-09-14 ENCOUNTER — HOSPITAL ENCOUNTER (OUTPATIENT)
Dept: HOSPITAL 53 - M CARPUL | Age: 29
End: 2019-09-14
Attending: INTERNAL MEDICINE
Payer: COMMERCIAL

## 2019-09-14 DIAGNOSIS — Z01.818: Primary | ICD-10-CM

## 2019-09-15 ENCOUNTER — HOSPITAL ENCOUNTER (OUTPATIENT)
Dept: HOSPITAL 53 - M LAB | Age: 29
End: 2019-09-15
Attending: INTERNAL MEDICINE
Payer: COMMERCIAL

## 2019-09-15 DIAGNOSIS — Z01.818: Primary | ICD-10-CM

## 2019-09-15 LAB
APPEARANCE UR: CLEAR
BACTERIA UR QL AUTO: NEGATIVE
BILIRUB UR QL STRIP.AUTO: NEGATIVE
GLUCOSE UR QL STRIP.AUTO: (no result) MG/DL
HGB UR QL STRIP.AUTO: NEGATIVE
KETONES UR QL STRIP.AUTO: NEGATIVE MG/DL
LEUKOCYTE ESTERASE UR QL STRIP.AUTO: (no result)
NITRITE UR QL STRIP.AUTO: NEGATIVE
PH UR STRIP.AUTO: 9 UNITS (ref 5–9)
PROT UR QL STRIP.AUTO: (no result) MG/DL
RBC # UR AUTO: 4 /HPF (ref 0–3)
SP GR UR STRIP.AUTO: 1.01 (ref 1–1.03)
SQUAMOUS #/AREA URNS AUTO: 0 /HPF (ref 0–6)
UROBILINOGEN UR QL STRIP.AUTO: 0.2 MG/DL (ref 0–2)
WBC #/AREA URNS AUTO: 11 /HPF (ref 0–3)

## 2019-09-16 NOTE — ECHO
DATE OF PROCEDURE:  09/14/2019

YOB: 1990

AGE:  29

ACCOUNT #:  [id number]

REFERRING PHYSICIAN:  Dr. Clive Cruz

PATIENT LOCATION:  Outpatient

REASON FOR THE STUDY:  Evaluation for kidney transplant

 

2-D Measurements:

IVS:  1.0 cm

LV:  3.1 cm

LVPW:  1.0 cm

LA:  2.8 cm

Aorta:  2.7 cm

IVC:  1.3 cm

 

Doppler Measurements:

Peak velocity across the aortic valve:  1.0 m/s

Peak velocity across the LVOT:  0.87 m/s

Mitral E:  0.62

Mitral A:  0.77

Ratio:  0.8

Maximum tricuspid valve velocity:  2.0 m/s

 

2-D Comments:

1.  Normal left ventricular size, wall thickness, and normal global left

ventricular systolic function.  The estimated ventricular systolic ejection

fraction is 65-70%.

2.  Normal left atrium.  Normal right atrium and right ventricle.

3.  The atrial septum appeared to be normal without evidence of defect or shunt.

4.  Normal aortic root.

5.  No pericardial effusion seen.

6.  The aortic valve, mitral valve, tricuspid valve, and pulmonic valve appeared

to be normal.  The proximal pulmonary artery branches were not well visualized.

7.  The inferior vena cava was normal in size, central venous pressure is most

likely normal.

 

DOPPLER:

Detects trace mitral regurgitation and trace tricuspid regurgitation.  The

calculated pulmonary artery systolic pressure is normal, less than 30 mmHg.

Abnormal relaxation pattern was noted across the mitral valve leaflets as well 
as

mitral valve annulus consistent with

features of grade 1 left ventricular diastolic dysfunction.

 

Impression:

1.  Normal global left ventricular systolic function.  There are some features 
of

grade 1 left ventricular diastolic dysfunction manifested by abnormal 
relaxation.

2.  Trace mitral regurgitation.

3.  Trace tricuspid regurgitation with a normal calculated pulmonary

artery systolic pressure.

[facility]

## 2019-09-18 ENCOUNTER — HOSPITAL ENCOUNTER (OUTPATIENT)
Dept: HOSPITAL 53 - M RAD | Age: 29
End: 2019-09-18
Attending: INTERNAL MEDICINE
Payer: COMMERCIAL

## 2019-09-18 DIAGNOSIS — Z76.82: ICD-10-CM

## 2019-09-18 DIAGNOSIS — Z01.818: Primary | ICD-10-CM

## 2019-09-19 NOTE — REP
Clinical:  Possible renal transplant.

 

Technique:  Real time gray scale ultrasound examination using curved array

transducer.

 

Findings:

Liver, spleen, and pancreas are normal in contour, size, echogenicity without

focal hepatic, splenic, or pancreatic lesion identified.  Splenic index equals

374.  The gallbladder is contracted but without obvious gallstones, wall

thickening, or pericholecystic fluid.  No biliary ductal dilatation is

appreciated and the common bile duct measures 5.4 mm diameter.  The bilateral

kidneys are normal in reniform shape, echogenicity and appearance.  No

hydronephrosis.  Right kidney measures 11.2 x 6.0 x 4.1 cm.  Left kidney measures

10.6 x 5.5 x 5.4 cm.  Abdominal aorta appears normal and measures 1.4 cm maximal

diameter.  No ascites.

 

Impression:

Essentially normal complete abdominal ultrasound.

 

 

Electronically Signed by

Boris Campbell MD 09/19/2019 06:39 A

## 2019-11-10 ENCOUNTER — HOSPITAL ENCOUNTER (OUTPATIENT)
Dept: HOSPITAL 53 - M LAB REF | Age: 29
End: 2019-11-10
Attending: INTERNAL MEDICINE
Payer: COMMERCIAL

## 2019-11-10 DIAGNOSIS — F55.2: ICD-10-CM

## 2019-11-10 DIAGNOSIS — K59.1: Primary | ICD-10-CM

## 2019-11-10 DIAGNOSIS — E73.9: ICD-10-CM

## 2019-11-13 ENCOUNTER — HOSPITAL ENCOUNTER (OUTPATIENT)
Dept: HOSPITAL 53 - M LAB | Age: 29
End: 2019-11-13
Attending: INTERNAL MEDICINE
Payer: COMMERCIAL

## 2019-11-13 DIAGNOSIS — Z01.818: Primary | ICD-10-CM

## 2019-11-13 LAB — EST. AVERAGE GLUCOSE BLD GHB EST-MCNC: 283 MG/DL (ref 60–110)

## 2019-12-29 ENCOUNTER — HOSPITAL ENCOUNTER (OUTPATIENT)
Dept: HOSPITAL 53 - M LAB REF | Age: 29
End: 2019-12-29
Attending: INTERNAL MEDICINE
Payer: COMMERCIAL

## 2019-12-29 DIAGNOSIS — R19.7: Primary | ICD-10-CM

## 2019-12-29 LAB — C DIFF TOX GENS STL QL NAA+PROBE: POSITIVE

## 2020-02-03 ENCOUNTER — HOSPITAL ENCOUNTER (EMERGENCY)
Dept: HOSPITAL 53 - M ED | Age: 30
Discharge: HOME | End: 2020-02-03
Payer: COMMERCIAL

## 2020-02-03 VITALS — SYSTOLIC BLOOD PRESSURE: 182 MMHG | DIASTOLIC BLOOD PRESSURE: 121 MMHG

## 2020-02-03 VITALS — WEIGHT: 110.23 LBS | HEIGHT: 61 IN | BODY MASS INDEX: 20.81 KG/M2

## 2020-02-03 DIAGNOSIS — N18.6: Primary | ICD-10-CM

## 2020-02-03 DIAGNOSIS — Z79.899: ICD-10-CM

## 2020-02-03 DIAGNOSIS — Z88.6: ICD-10-CM

## 2020-02-03 DIAGNOSIS — Z99.2: ICD-10-CM

## 2020-02-03 DIAGNOSIS — Z79.4: ICD-10-CM

## 2020-02-03 DIAGNOSIS — E87.5: ICD-10-CM

## 2020-02-03 DIAGNOSIS — Z95.9: ICD-10-CM

## 2020-02-03 DIAGNOSIS — E10.9: ICD-10-CM

## 2020-02-03 LAB
ALBUMIN SERPL BCG-MCNC: 3.3 GM/DL (ref 3.2–5.2)
ALT SERPL W P-5'-P-CCNC: 59 U/L (ref 12–78)
B-HCG SERPL QL: NEGATIVE
B-OH-BUTYR SERPL-MCNC: 5.88 MG/DL (ref ?–2.81)
BASE EXCESS BLDV CALC-SCNC: 0.6 MMOL/L (ref -2–2)
BASOPHILS # BLD AUTO: 0 10^3/UL (ref 0–0.2)
BASOPHILS NFR BLD AUTO: 0.7 % (ref 0–1)
BILIRUB CONJ SERPL-MCNC: 0.1 MG/DL (ref 0–0.2)
BILIRUB SERPL-MCNC: 0.4 MG/DL (ref 0.2–1)
BUN SERPL-MCNC: 41 MG/DL (ref 7–18)
CALCIUM SERPL-MCNC: 8.8 MG/DL (ref 8.5–10.1)
CHLORIDE SERPL-SCNC: 96 MEQ/L (ref 98–107)
CK MB CFR.DF SERPL CALC: 1.27
CK MB SERPL-MCNC: < 1 NG/ML (ref ?–3.6)
CK SERPL-CCNC: 79 U/L (ref 26–192)
CO2 BLDV CALC-SCNC: 28.1 MEQ/L (ref 24–28)
CO2 SERPL-SCNC: 31 MEQ/L (ref 21–32)
CREAT SERPL-MCNC: 6.35 MG/DL (ref 0.55–1.3)
EOSINOPHIL # BLD AUTO: 0.1 10^3/UL (ref 0–0.5)
EOSINOPHIL NFR BLD AUTO: 1 % (ref 0–3)
EST. AVERAGE GLUCOSE BLD GHB EST-MCNC: 203 MG/DL (ref 60–110)
ETHANOL SERPL-MCNC: < 0.003 % (ref 0–0.01)
GFR SERPL CREATININE-BSD FRML MDRD: 8.3 ML/MIN/{1.73_M2} (ref 60–?)
GLUCOSE SERPL-MCNC: 395 MG/DL (ref 70–100)
HCO3 BLDV-SCNC: 26.6 MEQ/L (ref 23–27)
HCO3 STD BLDV-SCNC: 24.8 MEQ/L
HCT VFR BLD AUTO: 32.2 % (ref 36–47)
HGB BLD-MCNC: 10.1 G/DL (ref 12–15.5)
LYMPHOCYTES # BLD AUTO: 0.8 10^3/UL (ref 1.5–5)
LYMPHOCYTES NFR BLD AUTO: 14.1 % (ref 24–44)
MAGNESIUM SERPL-MCNC: 3.2 MG/DL (ref 1.8–2.4)
MCH RBC QN AUTO: 29.6 PG (ref 27–33)
MCHC RBC AUTO-ENTMCNC: 31.4 G/DL (ref 32–36.5)
MCV RBC AUTO: 94.4 FL (ref 80–96)
MONOCYTES # BLD AUTO: 0.3 10^3/UL (ref 0–0.8)
MONOCYTES NFR BLD AUTO: 5 % (ref 0–5)
NEUTROPHILS # BLD AUTO: 4.6 10^3/UL (ref 1.5–8.5)
NEUTROPHILS NFR BLD AUTO: 78.7 % (ref 36–66)
PCO2 BLDV: 48.8 MMHG (ref 38–50)
PH BLDV: 7.35 UNITS (ref 7.33–7.43)
PLATELET # BLD AUTO: 315 10^3/UL (ref 150–450)
PO2 BLDV: 54 MMHG (ref 30–50)
POTASSIUM SERPL-SCNC: 6.4 MEQ/L (ref 3.5–5.1)
PROT SERPL-MCNC: 6.7 GM/DL (ref 6.4–8.2)
RBC # BLD AUTO: 3.41 10^6/UL (ref 4–5.4)
SAO2 % BLDV: 87.4 % (ref 60–80)
SODIUM SERPL-SCNC: 133 MEQ/L (ref 136–145)
TROPONIN I SERPL-MCNC: < 0.02 NG/ML (ref ?–0.1)
WBC # BLD AUTO: 5.8 10^3/UL (ref 4–10)

## 2020-02-03 PROCEDURE — 80048 BASIC METABOLIC PNL TOTAL CA: CPT

## 2020-02-03 PROCEDURE — 85025 COMPLETE CBC W/AUTO DIFF WBC: CPT

## 2020-02-03 PROCEDURE — 84703 CHORIONIC GONADOTROPIN ASSAY: CPT

## 2020-02-03 PROCEDURE — 87040 BLOOD CULTURE FOR BACTERIA: CPT

## 2020-02-03 PROCEDURE — 80076 HEPATIC FUNCTION PANEL: CPT

## 2020-02-03 PROCEDURE — 83735 ASSAY OF MAGNESIUM: CPT

## 2020-02-03 PROCEDURE — 82553 CREATINE MB FRACTION: CPT

## 2020-02-03 PROCEDURE — 93041 RHYTHM ECG TRACING: CPT

## 2020-02-03 PROCEDURE — 99285 EMERGENCY DEPT VISIT HI MDM: CPT

## 2020-02-03 PROCEDURE — 82550 ASSAY OF CK (CPK): CPT

## 2020-02-03 PROCEDURE — 82010 KETONE BODYS QUAN: CPT

## 2020-02-03 PROCEDURE — 93005 ELECTROCARDIOGRAM TRACING: CPT

## 2020-02-03 PROCEDURE — 83036 HEMOGLOBIN GLYCOSYLATED A1C: CPT

## 2020-02-03 PROCEDURE — 96365 THER/PROPH/DIAG IV INF INIT: CPT

## 2020-02-03 PROCEDURE — 71045 X-RAY EXAM CHEST 1 VIEW: CPT

## 2020-02-03 PROCEDURE — 82803 BLOOD GASES ANY COMBINATION: CPT

## 2020-02-03 NOTE — ECGEPIP
[facility] - ED

                                       

                                       Test Date:    2020

Pat Name:     KARYNA ADKINS          Department:   

Patient ID:   [id number]                 Room:         -

Gender:       Female                   Technician:   ab

:          1990               Requested By: PAM DOCKERY

Order Number: [id number]     Reading MD:   Jesusita Del Cid

                                 Measurements

Intervals                              Axis          

Rate:         93                       P:            44

CA:           157                      QRS:          36

QRSD:         84                       T:            57

QT:           348                                    

QTc:          435                                    

                           Interpretive Statements

SINUS RHYTHM

NSTTW abnormalities

DECREASED RATE 1/15/19

Electronically Signed on 2-3-2020 19:10:32 EST by Jesusita Del Cid

## 2020-02-03 NOTE — REP
Portable chest x-ray:  Single view:

 

History:  Diabetic ketoacidosis.

 

Comparison chest x-ray:  January 15, 2019.

 

Findings:  A right internal jugular tunneled central venous catheter is seen in

place with it is tip in the expected location of the right atrium SVC junction.

The lungs are well inflated and clear.  There is no evidence of pneumothorax or

hydrothorax.  Heart is not enlarged.  Pulmonary vasculature is not increased.  No

significant bony abnormality.

 

Impression:

 

No active disease.  Right-sided central venous catheter in place.

 

 

Electronically Signed by

Heraclio Gomez MD 02/03/2020 11:44 A

## 2020-02-21 ENCOUNTER — HOSPITAL ENCOUNTER (OUTPATIENT)
Dept: HOSPITAL 53 - M INFU | Age: 30
Discharge: HOME | End: 2020-02-21
Attending: INTERNAL MEDICINE
Payer: COMMERCIAL

## 2020-02-21 VITALS — SYSTOLIC BLOOD PRESSURE: 160 MMHG | DIASTOLIC BLOOD PRESSURE: 78 MMHG

## 2020-02-21 VITALS — SYSTOLIC BLOOD PRESSURE: 160 MMHG | DIASTOLIC BLOOD PRESSURE: 102 MMHG

## 2020-02-21 VITALS — DIASTOLIC BLOOD PRESSURE: 94 MMHG | SYSTOLIC BLOOD PRESSURE: 174 MMHG

## 2020-02-21 VITALS — SYSTOLIC BLOOD PRESSURE: 168 MMHG | DIASTOLIC BLOOD PRESSURE: 99 MMHG

## 2020-02-21 VITALS — WEIGHT: 110.23 LBS | HEIGHT: 61 IN | BODY MASS INDEX: 20.81 KG/M2

## 2020-02-21 VITALS — DIASTOLIC BLOOD PRESSURE: 71 MMHG | SYSTOLIC BLOOD PRESSURE: 170 MMHG

## 2020-02-21 DIAGNOSIS — D64.9: Primary | ICD-10-CM

## 2020-02-21 PROCEDURE — 36430 TRANSFUSION BLD/BLD COMPNT: CPT

## 2020-02-21 PROCEDURE — 86850 RBC ANTIBODY SCREEN: CPT

## 2020-02-21 PROCEDURE — 86901 BLOOD TYPING SEROLOGIC RH(D): CPT

## 2020-02-21 PROCEDURE — 86900 BLOOD TYPING SEROLOGIC ABO: CPT

## 2020-02-21 PROCEDURE — 86920 COMPATIBILITY TEST SPIN: CPT

## 2020-05-04 ENCOUNTER — HOSPITAL ENCOUNTER (INPATIENT)
Dept: HOSPITAL 53 - M ED | Age: 30
LOS: 3 days | Discharge: HOME | DRG: 640 | End: 2020-05-07
Attending: INTERNAL MEDICINE | Admitting: INTERNAL MEDICINE
Payer: MEDICARE

## 2020-05-04 VITALS — DIASTOLIC BLOOD PRESSURE: 86 MMHG | SYSTOLIC BLOOD PRESSURE: 133 MMHG

## 2020-05-04 VITALS — HEIGHT: 60 IN | WEIGHT: 97 LBS | BODY MASS INDEX: 19.04 KG/M2

## 2020-05-04 VITALS — SYSTOLIC BLOOD PRESSURE: 128 MMHG | DIASTOLIC BLOOD PRESSURE: 86 MMHG

## 2020-05-04 DIAGNOSIS — Z79.4: ICD-10-CM

## 2020-05-04 DIAGNOSIS — Z88.6: ICD-10-CM

## 2020-05-04 DIAGNOSIS — R56.9: ICD-10-CM

## 2020-05-04 DIAGNOSIS — N18.6: ICD-10-CM

## 2020-05-04 DIAGNOSIS — B95.2: ICD-10-CM

## 2020-05-04 DIAGNOSIS — B00.9: ICD-10-CM

## 2020-05-04 DIAGNOSIS — Z99.2: ICD-10-CM

## 2020-05-04 DIAGNOSIS — E83.52: Primary | ICD-10-CM

## 2020-05-04 DIAGNOSIS — Z86.59: ICD-10-CM

## 2020-05-04 DIAGNOSIS — M06.9: ICD-10-CM

## 2020-05-04 DIAGNOSIS — E03.9: ICD-10-CM

## 2020-05-04 DIAGNOSIS — Z79.899: ICD-10-CM

## 2020-05-04 DIAGNOSIS — I16.1: ICD-10-CM

## 2020-05-04 DIAGNOSIS — N39.0: ICD-10-CM

## 2020-05-04 DIAGNOSIS — E10.22: ICD-10-CM

## 2020-05-04 DIAGNOSIS — D63.1: ICD-10-CM

## 2020-05-04 DIAGNOSIS — Z83.3: ICD-10-CM

## 2020-05-04 DIAGNOSIS — I12.0: ICD-10-CM

## 2020-05-04 LAB
ALBUMIN SERPL BCG-MCNC: 3.5 GM/DL (ref 3.2–5.2)
ALBUMIN SERPL BCG-MCNC: 3.6 GM/DL (ref 3.2–5.2)
ALT SERPL W P-5'-P-CCNC: 24 U/L (ref 12–78)
ALT SERPL W P-5'-P-CCNC: 24 U/L (ref 12–78)
AMPHETAMINES UR QL SCN: NEGATIVE
BARBITURATES UR QL SCN: NEGATIVE
BASOPHILS # BLD AUTO: 0.1 10^3/UL (ref 0–0.2)
BASOPHILS # BLD AUTO: 0.1 10^3/UL (ref 0–0.2)
BASOPHILS NFR BLD AUTO: 0.6 % (ref 0–1)
BASOPHILS NFR BLD AUTO: 0.8 % (ref 0–1)
BENZODIAZ UR QL SCN: NEGATIVE
BILIRUB CONJ SERPL-MCNC: 0.2 MG/DL (ref 0–0.2)
BILIRUB CONJ SERPL-MCNC: 0.2 MG/DL (ref 0–0.2)
BILIRUB SERPL-MCNC: 0.6 MG/DL (ref 0.2–1)
BILIRUB SERPL-MCNC: 0.6 MG/DL (ref 0.2–1)
BUN SERPL-MCNC: 29 MG/DL (ref 7–18)
BZE UR QL SCN: NEGATIVE
CALCIUM SERPL-MCNC: 13.9 MG/DL (ref 8.5–10.1)
CANNABINOIDS UR QL SCN: NEGATIVE
CHLORIDE SERPL-SCNC: 85 MEQ/L (ref 98–107)
CK MB CFR.DF SERPL CALC: 4.65
CK MB SERPL-MCNC: 4.6 NG/ML (ref ?–3.6)
CK SERPL-CCNC: 99 U/L (ref 26–192)
CO2 SERPL-SCNC: 39 MEQ/L (ref 21–32)
CREAT SERPL-MCNC: 7.77 MG/DL (ref 0.55–1.3)
EOSINOPHIL # BLD AUTO: 0 10^3/UL (ref 0–0.5)
EOSINOPHIL # BLD AUTO: 0.1 10^3/UL (ref 0–0.5)
EOSINOPHIL NFR BLD AUTO: 0.2 % (ref 0–3)
EOSINOPHIL NFR BLD AUTO: 1.1 % (ref 0–3)
GFR SERPL CREATININE-BSD FRML MDRD: 6.5 ML/MIN/{1.73_M2} (ref 60–?)
GLUCOSE SERPL-MCNC: 299 MG/DL (ref 70–100)
HCT VFR BLD AUTO: 35.7 % (ref 36–47)
HCT VFR BLD AUTO: 40.2 % (ref 36–47)
HGB BLD-MCNC: 12 G/DL (ref 12–15.5)
HGB BLD-MCNC: 13.5 G/DL (ref 12–15.5)
LIPASE SERPL-CCNC: 32 U/L (ref 73–393)
LYMPHOCYTES # BLD AUTO: 0.9 10^3/UL (ref 1.5–5)
LYMPHOCYTES # BLD AUTO: 1.6 10^3/UL (ref 1.5–5)
LYMPHOCYTES NFR BLD AUTO: 18.5 % (ref 24–44)
LYMPHOCYTES NFR BLD AUTO: 8.6 % (ref 24–44)
MAGNESIUM SERPL-MCNC: 3 MG/DL (ref 1.8–2.4)
MCH RBC QN AUTO: 33 PG (ref 27–33)
MCH RBC QN AUTO: 33.4 PG (ref 27–33)
MCHC RBC AUTO-ENTMCNC: 33.6 G/DL (ref 32–36.5)
MCHC RBC AUTO-ENTMCNC: 33.6 G/DL (ref 32–36.5)
MCV RBC AUTO: 98.1 FL (ref 80–96)
MCV RBC AUTO: 99.5 FL (ref 80–96)
METHADONE UR QL SCN: NEGATIVE
MONOCYTES # BLD AUTO: 0.4 10^3/UL (ref 0–0.8)
MONOCYTES # BLD AUTO: 0.4 10^3/UL (ref 0–0.8)
MONOCYTES NFR BLD AUTO: 4 % (ref 0–5)
MONOCYTES NFR BLD AUTO: 4.2 % (ref 0–5)
NEUTROPHILS # BLD AUTO: 6.4 10^3/UL (ref 1.5–8.5)
NEUTROPHILS # BLD AUTO: 9 10^3/UL (ref 1.5–8.5)
NEUTROPHILS NFR BLD AUTO: 74.9 % (ref 36–66)
NEUTROPHILS NFR BLD AUTO: 86.1 % (ref 36–66)
OPIATES UR QL SCN: NEGATIVE
PCP UR QL SCN: NEGATIVE
PLATELET # BLD AUTO: 333 10^3/UL (ref 150–450)
PLATELET # BLD AUTO: 372 10^3/UL (ref 150–450)
POTASSIUM SERPL-SCNC: 5.5 MEQ/L (ref 3.5–5.1)
PROT SERPL-MCNC: 6.6 GM/DL (ref 6.4–8.2)
PROT SERPL-MCNC: 6.8 GM/DL (ref 6.4–8.2)
RBC # BLD AUTO: 3.64 10^6/UL (ref 4–5.4)
RBC # BLD AUTO: 4.04 10^6/UL (ref 4–5.4)
SODIUM SERPL-SCNC: 132 MEQ/L (ref 136–145)
TROPONIN I SERPL-MCNC: 0.03 NG/ML (ref ?–0.1)
TSH SERPL DL<=0.005 MIU/L-ACNC: 3.41 UIU/ML (ref 0.36–3.74)
WBC # BLD AUTO: 10.4 10^3/UL (ref 4–10)
WBC # BLD AUTO: 8.5 10^3/UL (ref 4–10)

## 2020-05-04 RX ADMIN — INSULIN LISPRO SCH UNITS: 100 INJECTION, SOLUTION INTRAVENOUS; SUBCUTANEOUS at 17:30

## 2020-05-04 RX ADMIN — FERROUS SULFATE TAB 325 MG (65 MG ELEMENTAL FE) SCH MG: 325 (65 FE) TAB at 20:41

## 2020-05-04 RX ADMIN — INSULIN LISPRO SCH UNITS: 100 INJECTION, SOLUTION INTRAVENOUS; SUBCUTANEOUS at 20:42

## 2020-05-04 RX ADMIN — SENNOSIDES SCH TAB: 8.6 TABLET, FILM COATED ORAL at 20:42

## 2020-05-04 RX ADMIN — DEXTROSE MONOHYDRATE SCH MG: 50 INJECTION, SOLUTION INTRAVENOUS at 18:15

## 2020-05-04 RX ADMIN — DEXTROSE MONOHYDRATE SCH MLS/HR: 5 INJECTION INTRAVENOUS at 18:44

## 2020-05-04 RX ADMIN — SODIUM CHLORIDE, PRESERVATIVE FREE SCH ML: 5 INJECTION INTRAVENOUS at 20:42

## 2020-05-04 NOTE — REP
RENAL ULTRASOUND:

 

Real-time sonographic evaluation of kidneys performed. The kidneys are somewhat

increased in echotexture suggesting medical renal disease. There is no

hydronephrosis or renal mass. Right kidney measures 12.2 x 4.3 x 4.2 cm and left

kidney 10.7 x 5.0 x 5.6 cm. Urinary bladder is mildly distended.

 

IMPRESSION:

 

Increased echotexture of the kidneys without evidence of hydronephrosis.

 

 

Electronically Signed by

Oswaldo Armstrong MD 05/05/2020 09:41 A

## 2020-05-04 NOTE — REP
CT BRAIN WITHOUT CONTRAST:

 

REASON:  Seizure.

 

PRIORS:  None.

 

TECHNIQUE:  4.5 mm contiguous transaxial sections were obtained from the skull

base to the cerebral convexities with thin cuts through the posterior fossa

without the administration of intravenous contrast.

 

FINDINGS:

 

The ventricles and sulci are consistent with the patient's age.  There are no

extra-axial fluid collections.  There is no mass effect.  The deep cerebral white

matter is consistent with the patient's age.

 

The orbital and petrous structures, cerebellopontine angles, and posterior fossa

are unremarkable.

 

The sella turcica, cavernous, and paracavernous structures are essentially

unremarkable.

 

The visualized portions of the paranasal sinuses and mastoid air cells are clear.

 

 

Images of the skull base show no gross abnormality.

 

IMPRESSION:

 

Essentially unremarkable CT examination of the brain.

 

 

Electronically Signed by

Calvin Parrish DO 05/04/2020 12:27 P

## 2020-05-04 NOTE — REP
CHEST, PORTABLE:

 

COMPARISON:  02/03/2020

 

There is no evidence of acute infiltrate.

 

No pleural effusion is seen.

 

The heart is normal in size.

 

The mediastinal silhouette is unremarkable.

 

The visualized osseous structures are intact.

 

Right central venous catheter is unchanged.

 

IMPRESSION:

 

No acute pulmonary disease.

 

 

Electronically Signed by

Oswaldo Armstrong MD 05/04/2020 02:44 P

## 2020-05-04 NOTE — ECGEPIP
[facility] - ED

                                       

                                       Test Date:    2020

Pat Name:     KARYNA ADKINS          Department:   

Patient ID:   [id number]                 Room:         [place] [id number]

Gender:       Female                   Technician:   ct

:          1990               Requested By: Jesusita Del Cid 

Order Number: [id number]     Reading MD:   Jesusita Del Cid

                                 Measurements

Intervals                              Axis          

Rate:         92                       P:            65

NJ:           160                      QRS:          49

QRSD:         84                       T:            70

QT:           374                                    

QTc:          463                                    

                           Interpretive Statements

SINUS RHYTHM

NSTTW abnormalities

SIMILAR 2/3/20

Electronically Signed on 2020 18:39:39 EDT by Jesusita Del Cid

## 2020-05-05 VITALS — SYSTOLIC BLOOD PRESSURE: 112 MMHG | DIASTOLIC BLOOD PRESSURE: 71 MMHG

## 2020-05-05 VITALS — DIASTOLIC BLOOD PRESSURE: 66 MMHG | SYSTOLIC BLOOD PRESSURE: 123 MMHG

## 2020-05-05 VITALS — SYSTOLIC BLOOD PRESSURE: 180 MMHG | DIASTOLIC BLOOD PRESSURE: 130 MMHG

## 2020-05-05 VITALS — DIASTOLIC BLOOD PRESSURE: 83 MMHG | SYSTOLIC BLOOD PRESSURE: 138 MMHG

## 2020-05-05 VITALS — SYSTOLIC BLOOD PRESSURE: 180 MMHG | DIASTOLIC BLOOD PRESSURE: 100 MMHG

## 2020-05-05 VITALS — SYSTOLIC BLOOD PRESSURE: 125 MMHG | DIASTOLIC BLOOD PRESSURE: 74 MMHG

## 2020-05-05 VITALS — SYSTOLIC BLOOD PRESSURE: 138 MMHG | DIASTOLIC BLOOD PRESSURE: 96 MMHG

## 2020-05-05 VITALS — SYSTOLIC BLOOD PRESSURE: 118 MMHG | DIASTOLIC BLOOD PRESSURE: 84 MMHG

## 2020-05-05 LAB
ALBUMIN SERPL BCG-MCNC: 3.3 GM/DL (ref 3.2–5.2)
ALT SERPL W P-5'-P-CCNC: 19 U/L (ref 12–78)
BILIRUB SERPL-MCNC: 0.6 MG/DL (ref 0.2–1)
BUN SERPL-MCNC: 37 MG/DL (ref 7–18)
CALCIUM SERPL-MCNC: 11.1 MG/DL (ref 8.5–10.1)
CHLORIDE SERPL-SCNC: 85 MEQ/L (ref 98–107)
CHOLEST SERPL-MCNC: 193 MG/DL (ref ?–200)
CHOLEST/HDLC SERPL: 1.52 {RATIO} (ref ?–5)
CO2 SERPL-SCNC: 40 MEQ/L (ref 21–32)
CREAT SERPL-MCNC: 9.13 MG/DL (ref 0.55–1.3)
EST. AVERAGE GLUCOSE BLD GHB EST-MCNC: 189 MG/DL (ref 60–110)
GFR SERPL CREATININE-BSD FRML MDRD: 5.4 ML/MIN/{1.73_M2} (ref 60–?)
GLUCOSE SERPL-MCNC: 208 MG/DL (ref 70–100)
HDLC SERPL-MCNC: 127 MG/DL (ref 40–?)
LDLC SERPL CALC-MCNC: 54 MG/DL (ref ?–100)
MAGNESIUM SERPL-MCNC: 2.9 MG/DL (ref 1.8–2.4)
NONHDLC SERPL-MCNC: 66 MG/DL
POTASSIUM SERPL-SCNC: 5 MEQ/L (ref 3.5–5.1)
PROT SERPL-MCNC: 6.5 GM/DL (ref 6.4–8.2)
SODIUM SERPL-SCNC: 131 MEQ/L (ref 136–145)
TRIGL SERPL-MCNC: 60 MG/DL (ref ?–150)
TROPONIN I SERPL-MCNC: 0.02 NG/ML (ref ?–0.1)

## 2020-05-05 PROCEDURE — 5A1D70Z PERFORMANCE OF URINARY FILTRATION, INTERMITTENT, LESS THAN 6 HOURS PER DAY: ICD-10-PCS | Performed by: INTERNAL MEDICINE

## 2020-05-05 RX ADMIN — ACETAMINOPHEN PRN MG: 325 TABLET ORAL at 04:53

## 2020-05-05 RX ADMIN — ACETAMINOPHEN PRN MG: 325 TABLET ORAL at 00:00

## 2020-05-05 RX ADMIN — SENNOSIDES SCH TAB: 8.6 TABLET, FILM COATED ORAL at 20:44

## 2020-05-05 RX ADMIN — SODIUM CHLORIDE, PRESERVATIVE FREE SCH ML: 5 INJECTION INTRAVENOUS at 20:44

## 2020-05-05 RX ADMIN — ACETAMINOPHEN PRN MG: 325 TABLET ORAL at 17:27

## 2020-05-05 RX ADMIN — SENNOSIDES SCH TAB: 8.6 TABLET, FILM COATED ORAL at 08:03

## 2020-05-05 RX ADMIN — SEVELAMER CARBONATE SCH MG: 800 TABLET, FILM COATED ORAL at 17:24

## 2020-05-05 RX ADMIN — FERROUS SULFATE TAB 325 MG (65 MG ELEMENTAL FE) SCH MG: 325 (65 FE) TAB at 08:03

## 2020-05-05 RX ADMIN — SEVELAMER CARBONATE SCH MG: 800 TABLET, FILM COATED ORAL at 13:26

## 2020-05-05 RX ADMIN — DEXTROSE MONOHYDRATE SCH MG: 50 INJECTION, SOLUTION INTRAVENOUS at 17:24

## 2020-05-05 RX ADMIN — LEVOTHYROXINE SODIUM SCH MCG: 25 TABLET ORAL at 04:25

## 2020-05-05 RX ADMIN — SODIUM CHLORIDE, PRESERVATIVE FREE SCH ML: 5 INJECTION INTRAVENOUS at 14:00

## 2020-05-05 RX ADMIN — INSULIN LISPRO SCH UNITS: 100 INJECTION, SOLUTION INTRAVENOUS; SUBCUTANEOUS at 08:04

## 2020-05-05 RX ADMIN — INSULIN LISPRO SCH UNITS: 100 INJECTION, SOLUTION INTRAVENOUS; SUBCUTANEOUS at 17:24

## 2020-05-05 RX ADMIN — SODIUM CHLORIDE SCH UNITS: 4.5 INJECTION, SOLUTION INTRAVENOUS at 21:00

## 2020-05-05 RX ADMIN — SODIUM CHLORIDE, PRESERVATIVE FREE SCH ML: 5 INJECTION INTRAVENOUS at 04:26

## 2020-05-05 RX ADMIN — FAMOTIDINE SCH MG: 20 TABLET, FILM COATED ORAL at 08:03

## 2020-05-05 RX ADMIN — HYDRALAZINE HYDROCHLORIDE PRN MG: 20 INJECTION INTRAMUSCULAR; INTRAVENOUS at 04:26

## 2020-05-05 RX ADMIN — INSULIN LISPRO SCH UNITS: 100 INJECTION, SOLUTION INTRAVENOUS; SUBCUTANEOUS at 13:26

## 2020-05-05 RX ADMIN — DEXTROSE MONOHYDRATE SCH MLS/HR: 5 INJECTION INTRAVENOUS at 04:25

## 2020-05-05 RX ADMIN — HYDRALAZINE HYDROCHLORIDE PRN MG: 20 INJECTION INTRAMUSCULAR; INTRAVENOUS at 17:28

## 2020-05-05 RX ADMIN — INSULIN LISPRO SCH UNITS: 100 INJECTION, SOLUTION INTRAVENOUS; SUBCUTANEOUS at 20:15

## 2020-05-05 RX ADMIN — ACETAMINOPHEN PRN MG: 325 TABLET ORAL at 10:51

## 2020-05-05 NOTE — IPNPDOC
Date Seen


The patient was seen on 5/5/20.





Progress Note


SUBJECTIVE: 


HD today, BP improved. Nephrology suspicious of hypercalcemia as cause of 

seizure. Holding medication to increase calcium. Denies chest pain, n/v/d, 

shortness of breath. 





OBJECTIVE: 





VITAL SIGNS: 


Please see below





PHYSICAL EXAMINATION: 


CONSTITUTIONAL: No acute distress, resting comfortably, AAO x 3


EYES: PERRLA, EOM intact


HENT, MOUTH: Normocephalic, atraumatic, moist mucous membranes


NECK: SUPPLE, no JVD, no lymphadenopathy, no carotid bruit


CV: Regular rate and rhythm, S1S2 normal, no murmurs/rubs/gallops


CHEST: Port in right upper chest 


RESPIRATORY: Clear to auscultation bilaterally, no rales/rhonchi/wheezes


GI:  BS positive in 4 quadrants, soft, nontender, nondistended, no rebound or 

guarding, no organomegaly


: Deferred


MUSCULOSKELETAL: Normal ROM. No cyanosis, clubbing, swelling, joint deformity, 

extremity edema


INTEGUMENTARY:  Intact, no rashes, no lesions, no erythema


NEUROLOGIC: Cranial Nerves II-XII are intact, no focal deficits


PSYCHIATRIC: Mood and affect are normal





CURRENT MEDICATIONS: Please see below 





LABORATORY DATA: Please see below 





IMAGING: 


None 





ASSESSMENT: 31 y/o F admitted for hypertensive emergency, new-onset seizure. 





PLAN: 





(1) Hypertensive emergency, acute and possibly 2/2 to hypercalcemia.  Per nephro

logy, it is highly suspicious that the hypertensive emergency, headache, and 

seizures were because of hypercalcemia. All the calcium-based binders, vitamin 

D, and vitamin D analogs have been stopped.  Hopeful that calcium level should 

improve with dialysis and stopping of medications. If it does not improve, then 

the dialysate will be changed to low calcium dialysate. BP ranging 120-180/74-90

at times. C/w home medication. 





(2) New onset seizure possbily 2/2 to hypercalcemia from ESRD vs. suppl

ementation. No new seizures overnight. Calcium improving. Dr. Mcqueen from 

neurology recommended only observation ,not to add any new medications and 

follow-up as an outpatient in his office. Patient CT head was essentially 

negative. Seizure precautions. Calcium should be near or normal prior to di

scharge. 





(3) ESRD (end stage renal disease). HD today. Nephrology following. 





(4) Bacteriuria. UA not highly suspicious for UTI, UCx pending. IV abx stopped. 





(5) Hypercalcemia, likely iatrogenic. PTH is adequately low.  In nephrology 

records, the patient was not supposed to be on calcitriol. That has been stopped

and it was decided she should not be on any vitamin D supplements or calcium-bas

ed phosphorus binders. F/u daily calcium level. 


 


(6) Hypothyroidism.  Continue current dose of levothyroxine 25 mcg by mouth 

daily.





(7) Diabetes mellitus type I. BS controlled. C/w fingerstick blood sugar every 

before meals and at bedtime with coverage. C/w all home meds, consistent carb 

diet. 





(8) DVT Px. Heparin SC Q12 hrs. 





DISPOSITION: Currently admitted under inpatient status. Plan is discharge home 

when medically improved.





VS, I&O, 24H, Aminah


Vital Signs/I&O





Vital Signs








  Date Time  Temp Pulse Resp B/P (MAP) Pulse Ox O2 Delivery O2 Flow Rate FiO2


 


5/5/20 18:37    118/84 (95)    


 


5/5/20 16:00 98.7 99 18  100 Room Air  














I&O- Last 24 Hours up to 6 AM 


 


 5/5/20





 06:00


 


Intake Total 0 ml


 


Output Total 0 ml


 


Balance 0 ml











Laboratory Data


24H LABS


Laboratory Tests 2


5/4/20 21:54: Troponin I 0.04#


5/5/20 05:14: 


Troponin I 0.02#, Anion Gap 6L, Glomerular Filtration Rate 5.4L, Estimated Mean 

Plasma Glucose 189H, Hemoglobin A1c 8.2, Calcium Level 11.1#H, Magnesium Level 

2.9H, Total Bilirubin 0.6, Aspartate Amino Transf (AST/SGOT) 16, Alanine 

Aminotransferase (ALT/SGPT) 19, Alkaline Phosphatase 81, Total Protein 6.5, 

Albumin 3.3, Albumin/Globulin Ratio 1.03, Triglycerides Level 60, Total 

Cholesterol 193, LDL Cholesterol 54, Non-HDL Cholesterol (LDL + VLDL) 66, Total 

HDL Cholesterol 127, Cholesterol/HDL Ratio 1.519


5/5/20 10:39: Bedside Glucose (Misc Panel) 146H


5/5/20 12:17: Bedside Glucose (Misc Panel) 200H


5/5/20 15:57: Whole Blood Ionized Calcium 4.9


5/5/20 17:10: Bedside Glucose (Misc Panel) 173H


5/5/20 20:13: Bedside Glucose (Misc Panel) 175H


CBC/BMP


Laboratory Tests


5/5/20 05:14








Microbiology





Microbiology


5/4/20 Gastrointestinal Tract Panel (PCR) - Final, Complete


         


5/4/20 Urine Culture, Received


         Pending





Current Medications





Current Medications








 Medications


  (Trade)  Dose


 Ordered  Sig/Nery


 Route


 PRN Reason  Start Time


 Stop Time Status Last Admin


Dose Admin


 


 Acetaminophen


  (Tylenol Tab)  650 mg  Q6HP  PRN


 PO


 PAIN  5/4/20 16:30


    5/5/20 17:27





 


 Amlodipine


 Besylate


  (Norvasc)  10 mg  DAILY


 PO


   5/5/20 09:00


     





 


 Calcitriol


  (Rocaltrol)  0.25 mcg  DAILY


 PO


   5/5/20 09:00


 5/4/20 16:31 DC  





 


 Calcium Acetate


  (Phoslo)  667 mg  TID


 PO


   5/4/20 21:00


 5/4/20 16:31 DC  





 


 Dextrose


  (Dextrose 50%)  25 ml  ASDIRECTED  PRN


 IV


 SEE LABEL COMMENTS  5/4/20 15:15


     





 


 Enoxaparin Sodium


  (Lovenox)  30 mg  DAILY@2100


 SC


   5/5/20 21:00


     





 


 Enoxaparin Sodium


  (Lovenox)  40 mg  DAILY


 SC


   5/5/20 09:00


 5/5/20 11:50 DC  





 


 Famotidine


  (Pepcid)  10 mg  QAM


 PO


   5/5/20 09:00


    5/5/20 08:03





 


 Ferrous Sulfate


  (Ferrous Sulfate)  325 mg  BID


 PO


   5/4/20 21:00


 5/5/20 12:28 DC 5/5/20 08:03





 


 Glucagon


  (Glucagon)  1 mg  ASDIRECTED  PRN


 SC


 SEE LABEL COMMENTS  5/4/20 15:15


     





 


 Glucose


  (Glucose)  16 GM  ASDIRECTED  PRN


 PO


 SEE LABEL COMMENTS  5/4/20 15:15


     





 


 Home Med


  (Med Rec


 Complete!)    ASDIRECTED


 XX


   5/4/20 16:15


 5/4/20 16:07 DC  





 


 Hydralazine HCl


  (Apresoline)  10 mg  Q6H  PRN


 IV


 SBP >150  5/4/20 15:15


    5/5/20 17:28





 


 Hydralazine HCl


  (Apresoline)  10 mg  STAT  STAT


 IV


   5/4/20 13:13


 5/4/20 13:14 DC 5/4/20 13:50





 


 Insulin Human


 Lispro


  (HumaLOG INSULIN)  SEE


 PROTOCOL


 TABLE  AC


 SC


   5/4/20 17:30


    5/5/20 17:24





 


 Insulin Human


 Lispro


  (HumaLOG INSULIN)  SEE


 PROTOCOL


 TABLE  QHS


 SC


   5/4/20 21:00


    5/4/20 20:42





 


 Labetalol HCl


  (Normodyne,


 Trandate)  20 mg  STAT  STAT


 IV


   5/4/20 11:40


 5/4/20 11:41 DC 5/4/20 12:04





 


 Labetalol HCl


  (Normodyne,


 Trandate)  20 mg  STAT  STAT


 IV


   5/4/20 12:10


 5/4/20 12:11 DC 5/4/20 12:23





 


 Levothyroxine


 Sodium


  (Synthroid)  25 mcg  DAILY@0600


 PO


   5/5/20 06:00


    5/5/20 04:25





 


 Lorazepam


  (Ativan)  1 mg  DAILY  PRN


 PO


 ANXIETY  5/4/20 16:30


     





 


 Lorazepam


  (Ativan)  1 mg  Q6HP  PRN


 IV


 ANXIETY/AGITATION  5/4/20 15:15


     





 


 Lorazepam


  (Ativan)  2 mg  STAT  STAT


 IV


   5/4/20 15:09


 5/4/20 15:10 DC 5/4/20 10:57





 


 Ondansetron HCl


  (ZOFRAN


 INJection)  4 mg  Q4HP  PRN


 IV


 NAUSEA OR VOMITING  5/4/20 15:30


    5/5/20 08:08





 


 Pantoprazole


 Sodium


  (Protonix)  40 mg  Q24H


 IV


   5/4/20 18:00


    5/5/20 17:24





 


 Piperacillin Sod/


 Tazobactam Sod


 2.25 gm/Dextrose  50 ml @ 


 100 mls/hr  Q12H


 IV


   5/4/20 15:30


 5/4/20 17:54 DC  





 


 Piperacillin Sod/


 Tazobactam Sod


 4.5 gm/Dextrose  50 ml @ 50


 mls/hr  Q12H


 IV


   5/4/20 17:00


 5/5/20 12:28 DC 5/5/20 04:25





 


 Senna


  (Senokot)  1 tab  BID


 PO


   5/4/20 21:00


    5/5/20 08:03





 


 Sevelamer


 Carbonate


  (Renvela)  1,600 mg  WM


 PO


   5/4/20 18:00


    5/5/20 17:24





 


 Sodium Chloride


  (Saline Lock


 Flush)  2 ml  ASDIRECTED  PRN


 IV


 SEE LABEL COMMENTS  5/4/20 17:30


     





 


 Sodium Chloride


  (Saline Lock


 Flush)  2 ml  SLF


 IV


   5/4/20 22:00


    5/5/20 14:00





 


 Sucroferric


 Oxyhydroxide


  (Velphoro)  500 mg  WM


 PO


   5/4/20 18:00


 5/5/20 11:49 DC  














Allergies


Coded Allergies:  


     NSAIDS (Non-Steroidal Anti-Inflamma (Verified  Adverse Reaction, Severe, 

Kidney Failure, 2/3/20)











Leida Wilkinson MD             May 5, 2020 20:27

## 2020-05-05 NOTE — CR
DATE OF CONSULTATION:  05/05/2020

 

REQUESTING PHYSICIAN:  Kevin Rodríguez MD

 

CONSULTING PHYSICIAN:  Dallas Rayo MD

 

REASON FOR CONSULTATION:  Management of end-stage renal disease, hemodialysis,

and hypertension.

 

CHIEF COMPLAINT:  The patient presented to the hospital on 05/04/2020.  The

patient was brought into the emergency room yesterday by family members via

ambulance because of inability to wake her up and altered mental status.

 

HISTORY OF PRESENT ILLNESS:  Shannon Cortez is a 30-year-old female with past

medical history of end-stage renal disease on hemodialysis every Tuesday,

Thursday, Saturday, well known to nephrology service from outpatient dialysis.

She is type 1 diabetic.  Multiple other comorbidities, as mentioned below.  She

was dialyzed last over the weekend on Saturday.  She was brought to the emergency

room yesterday via ambulance; and as reported by her family members, it was

difficult to wake the patient up.  She apparently had a tonic-clonic seizure at

home.  The patient was very hypertensive in the emergency room.  She needed

intravenous (IV) labetalol and IV Ativan injections for high blood pressure.  She

was admitted under the hospitalist service overnight.  Nephrology service was

called for further help in the management of this patient with end-stage renal

disease and hypertensive emergency.

 

I saw and evaluated the patient today morning in the bedside.  She is feeling

much better today.  She is awake and alert.  She is tolerating the hemodialysis.

 

PAST MEDICAL HISTORY:  Past medical history of end-stage renal disease on

hemodialysis every Tuesday, Thursday, Saturday, history of bulimia nervosa and

binge-eating disorder, rheumatoid arthritis, diabetes mellitus type 1, anemia in

end-stage renal disease.

 

PAST SURGICAL HISTORY:  Status post esophagogastroduodenoscopy (EGD) and

colonoscopy in the past, status post renal biopsy in the past, and a right

internal jugular (vein) (IJ) tunneled hemodialysis catheter.

 

ALLERGIES:  She is allergic to NSAIDs.

 

FAMILY HISTORY:  One sister has type 1 diabetes.  No history of end-stage renal

disease.

 

SOCIAL HISTORY:  The patient lives at home.  Denies any smoking, illicit drug

abuse, or alcohol abuse.

 

REVIEW OF SYSTEMS:

Constitutional:  She denies any weakness at this time.

Eyes:  She denies any blurry vision, double vision.

Ears, nose, and throat (ENT):  She denies any dysphagia, odynophagia, ear

discharge.

Cardiovascular:  She denies any chest pain or palpitation.

Respiratory:  She denies any shortness of breath or cough.

Gastrointestinal (GI):  She denies any nausea, vomiting.

Genitourinary:  She denies any dysuria or hematuria.

Musculoskeletal:  She denies any muscle aches and pains.

Skin:  She denies any rashes or ulcers.

Central nervous system (CNS):  The patient had altered mental status yesterday,

but she denies any active complaints at this time.

Hematological/oncological:  She denies any easy bleeding or bruising.

 

All other review of systems is negative.

 

PHYSICAL EXAMINATION:

General:  The patient is awake, alert, oriented times three, laying in bed,

getting hemodialysis done.

Vital signs:  Temperature is 97.6 degrees Fahrenheit, blood pressure 138/83,

pulse is 115, respiratory rate of 18, saturating 96% on room air.

Head and neck examination:  Extraocular muscles intact.  Pupils equally round and

reactive to light.  Mucous membranes are moist.

Neck is supple.  There is no jugular venous distention (JVD).

She has a right IJ tunneled hemodialysis catheter.

Cardiovascular:  S1, S2, regular rate.  No edema of the bilateral lower

extremities.

Respiratory:  Chest is clear to auscultation bilaterally.  Bilateral equal air

entry.  No rales or rhonchi.

Abdomen:  Soft, positive bowel sounds.  Nontender.  No organomegaly.

Musculoskeletal:  No clubbing or cyanosis.  Pulses are 2+.

Central nervous system (CNS):  No focal deficit.  Power is 5/5 in all

extremities.

Skin:  No rashes or ulcers.

 

LABORATORY REVIEW:

Complete blood count (CBC) showed a WBC 10.4, hemoglobin is 12, platelets of

333.

 

Urinalysis showed 2+ protein, 1+ blood, 16 WBCs, 1+ bacteria.

 

Basic metabolic profile (BMP) showed sodium 131, potassium 5, chloride 85,

bicarbonate 40, BUN 37, creatinine 9.1, hemoglobin A1c 8.2, calcium was 13.9 on

arrival and 11.1 now.

 

PTH was done yesterday, which was 14.6.

 

Toxicology:  All the urine toxicology is negative.

 

MICROBIOLOGY:

Urine culture is pending.

 

Gastrointestinal (GI) panel is negative.

 

IMAGING:  A CAT scan of the head was done yesterday, which showed no acute

pathology.

 

CURRENT INPATIENT MEDICATIONS:  The patient is getting Zosyn 4.5 grams IV every

12 hours.  I have stopped that.  She is on Tylenol as needed.  She is getting

amlodipine 10 mg daily.  The patient was getting calcitriol 0.25 mcg by mouth

daily, which I have stopped.  She is also getting calcium acetate 661 mg by mouth

three times a day with meals, which I have stopped, as well.  She is on Lovenox

30 mg subcutaneous daily, Pepcid 10 mg in the morning, iron tablet 325 mg by

mouth twice a day which I have stopped, as well.  She is on hydralazine 10 mg

every 6 hours as needed systolic blood pressure more than 150.  She is on insulin

lispro sliding scale, levothyroxine 25 mcg by mouth daily.  She was given IV

Ativan yesterday.  She is on Protonix 40 mg IV daily, Senokot one tablet by mouth

twice a day, Renvela 1600 mg by mouth with meals.

 

ASSESSMENT:  A 30-year-old female with a history of end-stage renal disease on

hemodialysis, diabetes mellitus type 1, admitted this time with hypertensive

emergency and seizures.

 

PLAN:

 

1.  End-stage renal disease.  The patient is being dialyzed according to her

regular schedule.  Minimal ultrafiltration is done.  She does not have any signs

of fluid overload.

 

2.  Hypertensive emergency.  The patient's home blood pressure medication was

restarted, 10 mg daily.  She needed IV hydralazine and labetalol yesterday.

Apparently, she has a drop in the blood pressures with dialysis.  She usually

does not have history of high blood pressures.  I highly suspect that the

hypertensive emergency, headache, and seizures were because of hypercalcemia.

All the calcium-based binders, vitamin D, and vitamin D analogs have been

stopped.  Hopefully, calcium level should improve with dialysis.  If it does not

improve, then the dialysate will be changed to low calcium dialysate.

 

3.  Hypercalcemia.  Most likely, it is iatrogenic.  PTH is adequately low.  In

our records, the patient is not supposed to be on calcitriol.  Not sure if the

patient was taking calcitriol at home.  However, it has been stopped now.  She

should not be on any vitamin D supplements or calcium-based phosphorus binders.

I would repeat the ionized calcium level after dialysis today.

 

4.  Bacteriuria.  The patient has small amount of bacteria in the urinalysis.

However, she has negative nitrite and very trace leukocyte esterase.  I do not

think that the patient has a urinary tract infection (UTI) at this time.  Urine

culture is still pending.  I am going to stop the antibiotic at this time.  They

will be only started if the patient has significant positive urine cultures.

 

5.  Diabetes mellitus type 1.  The patient is insulin dependent.  She is getting

insulin sliding scale at this time.  Glucose levels are controlled.

 

6.  Hypothyroidism.  Continue current dose of levothyroxine 25 mcg by mouth

daily.

 

7.  Chronic kidney disease, mineral bone disease.  Continue current dose of

Renvela 1600 mg by mouth with meals.  Phosphorus level is pending.  No need of

Velphoro at this time.

 

Thank you for involving me in the care of this patient.  I shall be happy to

follow the patient along with you tomorrow morning.

## 2020-05-06 VITALS — DIASTOLIC BLOOD PRESSURE: 100 MMHG | SYSTOLIC BLOOD PRESSURE: 140 MMHG

## 2020-05-06 VITALS — SYSTOLIC BLOOD PRESSURE: 142 MMHG | DIASTOLIC BLOOD PRESSURE: 98 MMHG

## 2020-05-06 VITALS — DIASTOLIC BLOOD PRESSURE: 100 MMHG | SYSTOLIC BLOOD PRESSURE: 156 MMHG

## 2020-05-06 VITALS — SYSTOLIC BLOOD PRESSURE: 160 MMHG | DIASTOLIC BLOOD PRESSURE: 110 MMHG

## 2020-05-06 VITALS — SYSTOLIC BLOOD PRESSURE: 148 MMHG | DIASTOLIC BLOOD PRESSURE: 100 MMHG

## 2020-05-06 VITALS — SYSTOLIC BLOOD PRESSURE: 130 MMHG | DIASTOLIC BLOOD PRESSURE: 90 MMHG

## 2020-05-06 VITALS — DIASTOLIC BLOOD PRESSURE: 98 MMHG | SYSTOLIC BLOOD PRESSURE: 170 MMHG

## 2020-05-06 LAB
ALBUMIN SERPL BCG-MCNC: 3.1 GM/DL (ref 3.2–5.2)
ALT SERPL W P-5'-P-CCNC: 19 U/L (ref 12–78)
BILIRUB SERPL-MCNC: 0.5 MG/DL (ref 0.2–1)
BUN SERPL-MCNC: 22 MG/DL (ref 7–18)
CALCIUM SERPL-MCNC: 9.6 MG/DL (ref 8.5–10.1)
CHLORIDE SERPL-SCNC: 96 MEQ/L (ref 98–107)
CO2 SERPL-SCNC: 31 MEQ/L (ref 21–32)
CREAT SERPL-MCNC: 6.15 MG/DL (ref 0.55–1.3)
GFR SERPL CREATININE-BSD FRML MDRD: 8.6 ML/MIN/{1.73_M2} (ref 60–?)
GLUCOSE SERPL-MCNC: 228 MG/DL (ref 70–100)
HCT VFR BLD AUTO: 30.9 % (ref 36–47)
HGB BLD-MCNC: 10.5 G/DL (ref 12–15.5)
MCH RBC QN AUTO: 34 PG (ref 27–33)
MCHC RBC AUTO-ENTMCNC: 34 G/DL (ref 32–36.5)
MCV RBC AUTO: 100 FL (ref 80–96)
PLATELET # BLD AUTO: 328 10^3/UL (ref 150–450)
POTASSIUM SERPL-SCNC: 4.9 MEQ/L (ref 3.5–5.1)
PROT SERPL-MCNC: 6.1 GM/DL (ref 6.4–8.2)
RBC # BLD AUTO: 3.09 10^6/UL (ref 4–5.4)
SODIUM SERPL-SCNC: 133 MEQ/L (ref 136–145)
WBC # BLD AUTO: 8.9 10^3/UL (ref 4–10)

## 2020-05-06 RX ADMIN — SENNOSIDES SCH TAB: 8.6 TABLET, FILM COATED ORAL at 21:00

## 2020-05-06 RX ADMIN — INSULIN LISPRO SCH UNITS: 100 INJECTION, SOLUTION INTRAVENOUS; SUBCUTANEOUS at 17:23

## 2020-05-06 RX ADMIN — ACETAMINOPHEN PRN MG: 325 TABLET ORAL at 14:34

## 2020-05-06 RX ADMIN — INSULIN LISPRO SCH UNITS: 100 INJECTION, SOLUTION INTRAVENOUS; SUBCUTANEOUS at 08:26

## 2020-05-06 RX ADMIN — SODIUM CHLORIDE, PRESERVATIVE FREE SCH ML: 5 INJECTION INTRAVENOUS at 13:03

## 2020-05-06 RX ADMIN — SEVELAMER CARBONATE SCH MG: 800 TABLET, FILM COATED ORAL at 08:24

## 2020-05-06 RX ADMIN — DIPHENHYDRAMINE HYDROCHLORIDE AND LIDOCAINE HYDROCHLORIDE AND ALUMINUM HYDROXIDE AND MAGNESIUM HYDRO PRN EA: KIT at 16:43

## 2020-05-06 RX ADMIN — HYDRALAZINE HYDROCHLORIDE PRN MG: 20 INJECTION INTRAMUSCULAR; INTRAVENOUS at 05:32

## 2020-05-06 RX ADMIN — FAMOTIDINE SCH MG: 20 TABLET, FILM COATED ORAL at 08:23

## 2020-05-06 RX ADMIN — INSULIN LISPRO SCH UNITS: 100 INJECTION, SOLUTION INTRAVENOUS; SUBCUTANEOUS at 21:00

## 2020-05-06 RX ADMIN — LOPERAMIDE HYDROCHLORIDE PRN MG: 2 TABLET, FILM COATED ORAL at 21:13

## 2020-05-06 RX ADMIN — SENNOSIDES SCH TAB: 8.6 TABLET, FILM COATED ORAL at 08:24

## 2020-05-06 RX ADMIN — INSULIN LISPRO SCH UNITS: 100 INJECTION, SOLUTION INTRAVENOUS; SUBCUTANEOUS at 12:11

## 2020-05-06 RX ADMIN — SODIUM CHLORIDE SCH UNITS: 4.5 INJECTION, SOLUTION INTRAVENOUS at 08:31

## 2020-05-06 RX ADMIN — SODIUM CHLORIDE SCH UNITS: 4.5 INJECTION, SOLUTION INTRAVENOUS at 08:28

## 2020-05-06 RX ADMIN — ACETAMINOPHEN PRN MG: 325 TABLET ORAL at 08:25

## 2020-05-06 RX ADMIN — DIPHENHYDRAMINE HYDROCHLORIDE AND LIDOCAINE HYDROCHLORIDE AND ALUMINUM HYDROXIDE AND MAGNESIUM HYDRO PRN EA: KIT at 21:13

## 2020-05-06 RX ADMIN — SODIUM CHLORIDE SCH UNITS: 4.5 INJECTION, SOLUTION INTRAVENOUS at 21:00

## 2020-05-06 RX ADMIN — SODIUM CHLORIDE, PRESERVATIVE FREE SCH ML: 5 INJECTION INTRAVENOUS at 05:42

## 2020-05-06 RX ADMIN — SEVELAMER CARBONATE SCH MG: 800 TABLET, FILM COATED ORAL at 12:11

## 2020-05-06 RX ADMIN — LEVOTHYROXINE SODIUM SCH MCG: 25 TABLET ORAL at 05:42

## 2020-05-06 RX ADMIN — SEVELAMER CARBONATE SCH MG: 800 TABLET, FILM COATED ORAL at 17:22

## 2020-05-06 RX ADMIN — DEXTROSE MONOHYDRATE SCH MG: 50 INJECTION, SOLUTION INTRAVENOUS at 17:22

## 2020-05-06 RX ADMIN — LOPERAMIDE HYDROCHLORIDE PRN MG: 2 TABLET, FILM COATED ORAL at 13:02

## 2020-05-06 NOTE — IPNPDOC
Date Seen


The patient was seen on 5/6/20.





Progress Note


SUBJECTIVE: 


BP still elevated at 140/100, started on carvedilol BID with holding paramters. 

No seizure activity. Denies chest pain, n/v/d, shortness of breath. 





OBJECTIVE: 





VITAL SIGNS: 


Please see below





PHYSICAL EXAMINATION: 


CONSTITUTIONAL: No acute distress, resting comfortably, AAO x 3


EYES: PERRLA, EOM intact


HENT, MOUTH: Normocephalic, atraumatic, moist mucous membranes


NECK: SUPPLE, no JVD, no lymphadenopathy, no carotid bruit


CV: Regular rate and rhythm, S1S2 normal, no murmurs/rubs/gallops


CHEST: Port in right upper chest 


RESPIRATORY: Clear to auscultation bilaterally, no rales/rhonchi/wheezes


GI:  BS positive in 4 quadrants, soft, nontender, nondistended, no rebound or 

guarding, no organomegaly


: Deferred


MUSCULOSKELETAL: Normal ROM. No cyanosis, clubbing, swelling, joint deformity, 

extremity edema


INTEGUMENTARY:  Intact, no rashes, no lesions, no erythema


NEUROLOGIC: Cranial Nerves II-XII are intact, no focal deficits


PSYCHIATRIC: Mood and affect are normal





CURRENT MEDICATIONS: Please see below 





LABORATORY DATA: Please see below 





IMAGING: 


None 





ASSESSMENT: 31 y/o F admitted for hypertensive emergency, new-onset seizure. 





PLAN: 





(1) Uncontrolled HTN. /100. Started on carvedilol BID with holding 

parameters. C/w amlodipine at current dose. 





(2) New onset seizure possbily 2/2 to hypercalcemia from ESRD vs. 

supplementation. No new seizures overnight. Calcium wnl today and will keep off 

all supplementation.





(3) ESRD (end stage renal disease). HD tomorrow. Nephrology following. 





(4) UTI, E. faecalis. Started on levofloxacin (Day 1 of 3) 





(5) Hypercalcemia, likely iatrogenic. Calcium now normal. F/u daily calcium 

level. 


 


(6) Hypothyroidism.  Continue current dose of levothyroxine 25 mcg by mouth 

daily.





(7) Diabetes mellitus type I. BS controlled. Restarted insulin glargine HS. C/w 

fingerstick blood sugar every before meals and at bedtime with coverage, 

consistent carb diet. 





(8) DVT Px. Heparin SC Q12 hrs. 





DISPOSITION: Currently admitted under inpatient status. Plan is discharge home 

when BP better controlled. If tomorrow, after dialysis.





VS, I&O, 24H, Fishbone


Vital Signs/I&O





Vital Signs








  Date Time  Temp Pulse Resp B/P (MAP) Pulse Ox O2 Delivery O2 Flow Rate FiO2


 


5/6/20 13:02  99  148/100    


 


5/6/20 12:00 97.1  17  98 Room Air  














I&O- Last 24 Hours up to 6 AM 


 


 5/6/20





 06:00


 


Intake Total 1130 ml


 


Output Total 1300 ml


 


Balance -170 ml











Laboratory Data


24H LABS


Laboratory Tests 2


5/5/20 20:13: Bedside Glucose (Misc Panel) 175H


5/6/20 05:21: 


Nucleated Red Blood Cells % (auto) 0.0, Anion Gap 6L, Glomerular Filtration Rate

8.6L, Calcium Level 9.6, Whole Blood Ionized Calcium 5.0, Total Bilirubin 0.5, 

Aspartate Amino Transf (AST/SGOT) 11, Alanine Aminotransferase (ALT/SGPT) 19, 

Alkaline Phosphatase 78, Total Protein 6.1L, Albumin 3.1L, Albumin/Globulin 

Ratio 1.03


5/6/20 11:17: Bedside Glucose (Misc Panel) 263H


5/6/20 16:46: Bedside Glucose (Misc Panel) 332H


CBC/BMP


Laboratory Tests


5/6/20 05:21








Microbiology





Microbiology


5/4/20 Gastrointestinal Tract Panel (PCR) - Final, Complete


         


5/4/20 Urine Culture - Final, Complete


         Enterococcus Faecalis





Current Medications





Current Medications








 Medications


  (Trade)  Dose


 Ordered  Sig/Nery


 Route


 PRN Reason  Start Time


 Stop Time Status Last Admin


Dose Admin


 


 Acetaminophen


  (Tylenol Tab)  650 mg  Q6HP  PRN


 PO


 PAIN  5/4/20 16:30


    5/6/20 14:34





 


 Amlodipine


 Besylate


  (Norvasc)  10 mg  DAILY


 PO


   5/5/20 09:00


    5/6/20 08:28





 


 Calcitriol


  (Rocaltrol)  0.25 mcg  DAILY


 PO


   5/5/20 09:00


 5/4/20 16:31 DC  





 


 Calcium Acetate


  (Phoslo)  667 mg  TID


 PO


   5/4/20 21:00


 5/4/20 16:31 DC  





 


 Carvedilol


  (COReg)  3.125 mg  BID


 PO


   5/6/20 09:00


    5/6/20 13:02





 


 Dextrose


  (Dextrose 50%)  25 ml  ASDIRECTED  PRN


 IV


 SEE LABEL COMMENTS  5/4/20 15:15


     





 


 Enoxaparin Sodium


  (Lovenox)  30 mg  DAILY@2100


 SC


   5/5/20 21:00


 5/5/20 20:35 DC  





 


 Enoxaparin Sodium


  (Lovenox)  40 mg  DAILY


 SC


   5/5/20 09:00


 5/5/20 11:50 DC  





 


 Famotidine


  (Pepcid)  10 mg  QAM


 PO


   5/5/20 09:00


    5/6/20 08:23





 


 Ferrous Sulfate


  (Ferrous Sulfate)  325 mg  BID


 PO


   5/4/20 21:00


 5/5/20 12:28 DC 5/5/20 08:03





 


 Glucagon


  (Glucagon)  1 mg  ASDIRECTED  PRN


 SC


 SEE LABEL COMMENTS  5/4/20 15:15


     





 


 Glucose


  (Glucose)  16 GM  ASDIRECTED  PRN


 PO


 SEE LABEL COMMENTS  5/4/20 15:15


     





 


 Heparin Sodium


  (Porcine)


  (Heparin)  5,000 units  Q12H


 SQ


   5/5/20 21:00


     





 


 Home Med


  (Med Rec


 Complete!)    ASDIRECTED


 XX


   5/4/20 16:15


 5/4/20 16:07 DC  





 


 Hydralazine HCl


  (Apresoline)  10 mg  Q6H  PRN


 IV


 SBP >150  5/4/20 15:15


    5/6/20 05:32





 


 Hydralazine HCl


  (Apresoline)  10 mg  STAT  STAT


 IV


   5/4/20 13:13


 5/4/20 13:14 DC 5/4/20 13:50





 


 Insulin Detemir


  (Levemir Insulin)  5 units  QHS


 SC


   5/6/20 21:00


     





 


 Insulin Human


 Lispro


  (HumaLOG INSULIN)  SEE


 PROTOCOL


 TABLE  AC


 SC


   5/4/20 17:30


    5/6/20 17:23





 


 Insulin Human


 Lispro


  (HumaLOG INSULIN)  SEE


 PROTOCOL


 TABLE  QHS


 SC


   5/4/20 21:00


    5/4/20 20:42





 


 Labetalol HCl


  (Normodyne,


 Trandate)  20 mg  STAT  STAT


 IV


   5/4/20 11:40


 5/4/20 11:41 DC 5/4/20 12:04





 


 Labetalol HCl


  (Normodyne,


 Trandate)  20 mg  STAT  STAT


 IV


   5/4/20 12:10


 5/4/20 12:11 DC 5/4/20 12:23





 


 Levofloxacin


  (Levaquin)  250 mg  DAILY@06


 PO


   5/6/20 06:00


 5/11/20 05:59  5/6/20 13:02





 


 Levothyroxine


 Sodium


  (Synthroid)  25 mcg  DAILY@0600


 PO


   5/5/20 06:00


    5/6/20 05:42





 


 Lidocaine/


 Diphenhydr/Alum/


 Mg/Simeth


  (Magic Mouthwash)  5ML  TIDP  PRN


 SSP


 DISCOMFORT  5/6/20 12:45


    5/6/20 16:43





 


 Loperamide HCl


  (Imodium)  2 mg  Q6HP  PRN


 PO


 DIARRHEA  5/6/20 12:45


    5/6/20 13:02





 


 Lorazepam


  (Ativan)  1 mg  DAILY  PRN


 PO


 ANXIETY  5/4/20 16:30


     





 


 Lorazepam


  (Ativan)  1 mg  Q6HP  PRN


 IV


 ANXIETY/AGITATION  5/4/20 15:15


     





 


 Lorazepam


  (Ativan)  2 mg  STAT  STAT


 IV


   5/4/20 15:09


 5/4/20 15:10 DC 5/4/20 10:57





 


 Ondansetron HCl


  (ZOFRAN


 INJection)  4 mg  Q4HP  PRN


 IV


 NAUSEA OR VOMITING  5/4/20 15:30


    5/5/20 08:08





 


 Pantoprazole


 Sodium


  (Protonix)  40 mg  Q24H


 IV


   5/4/20 18:00


    5/6/20 17:22





 


 Piperacillin Sod/


 Tazobactam Sod


 2.25 gm/Dextrose  50 ml @ 


 100 mls/hr  Q12H


 IV


   5/4/20 15:30


 5/4/20 17:54 DC  





 


 Piperacillin Sod/


 Tazobactam Sod


 4.5 gm/Dextrose  50 ml @ 50


 mls/hr  Q12H


 IV


   5/4/20 17:00


 5/5/20 12:28 DC 5/5/20 04:25





 


 Senna


  (Senokot)  1 tab  BID


 PO


   5/4/20 21:00


    5/5/20 08:03





 


 Sevelamer


 Carbonate


  (Renvela)  1,600 mg  WM


 PO


   5/4/20 18:00


    5/6/20 17:22





 


 Sodium Chloride


  (Saline Lock


 Flush)  2 ml  ASDIRECTED  PRN


 IV


 SEE LABEL COMMENTS  5/4/20 17:30


     





 


 Sodium Chloride


  (Saline Lock


 Flush)  2 ml  SLF


 IV


   5/4/20 22:00


    5/6/20 13:03





 


 Sucroferric


 Oxyhydroxide


  (Velphoro)  500 mg  WM


 PO


   5/4/20 18:00


 5/5/20 11:49 DC  














Allergies


Coded Allergies:  


     NSAIDS (Non-Steroidal Anti-Inflamma (Verified  Adverse Reaction, Severe, 

Kidney Failure, 2/3/20)











Leida Wiklinson MD             May 6, 2020 18:47

## 2020-05-07 VITALS — SYSTOLIC BLOOD PRESSURE: 156 MMHG | DIASTOLIC BLOOD PRESSURE: 100 MMHG

## 2020-05-07 VITALS — SYSTOLIC BLOOD PRESSURE: 139 MMHG | DIASTOLIC BLOOD PRESSURE: 101 MMHG

## 2020-05-07 VITALS — SYSTOLIC BLOOD PRESSURE: 160 MMHG | DIASTOLIC BLOOD PRESSURE: 100 MMHG

## 2020-05-07 VITALS — SYSTOLIC BLOOD PRESSURE: 161 MMHG | DIASTOLIC BLOOD PRESSURE: 107 MMHG

## 2020-05-07 VITALS — DIASTOLIC BLOOD PRESSURE: 100 MMHG | SYSTOLIC BLOOD PRESSURE: 168 MMHG

## 2020-05-07 LAB
ALBUMIN SERPL BCG-MCNC: 2.8 GM/DL (ref 3.2–5.2)
ALT SERPL W P-5'-P-CCNC: 25 U/L (ref 12–78)
BILIRUB SERPL-MCNC: 0.4 MG/DL (ref 0.2–1)
BUN SERPL-MCNC: 36 MG/DL (ref 7–18)
CALCIUM SERPL-MCNC: 9.2 MG/DL (ref 8.5–10.1)
CHLORIDE SERPL-SCNC: 95 MEQ/L (ref 98–107)
CO2 SERPL-SCNC: 29 MEQ/L (ref 21–32)
CREAT SERPL-MCNC: 8.07 MG/DL (ref 0.55–1.3)
GFR SERPL CREATININE-BSD FRML MDRD: 6.3 ML/MIN/{1.73_M2} (ref 60–?)
GLUCOSE SERPL-MCNC: 195 MG/DL (ref 70–100)
HCT VFR BLD AUTO: 28.3 % (ref 36–47)
HGB BLD-MCNC: 9.8 G/DL (ref 12–15.5)
MCH RBC QN AUTO: 33.8 PG (ref 27–33)
MCHC RBC AUTO-ENTMCNC: 34.6 G/DL (ref 32–36.5)
MCV RBC AUTO: 97.6 FL (ref 80–96)
PLATELET # BLD AUTO: 324 10^3/UL (ref 150–450)
POTASSIUM SERPL-SCNC: 5.2 MEQ/L (ref 3.5–5.1)
PROT SERPL-MCNC: 5.7 GM/DL (ref 6.4–8.2)
RBC # BLD AUTO: 2.9 10^6/UL (ref 4–5.4)
SODIUM SERPL-SCNC: 132 MEQ/L (ref 136–145)
WBC # BLD AUTO: 7.2 10^3/UL (ref 4–10)

## 2020-05-07 RX ADMIN — INSULIN LISPRO SCH UNITS: 100 INJECTION, SOLUTION INTRAVENOUS; SUBCUTANEOUS at 12:55

## 2020-05-07 RX ADMIN — SEVELAMER CARBONATE SCH MG: 800 TABLET, FILM COATED ORAL at 17:00

## 2020-05-07 RX ADMIN — INSULIN LISPRO SCH UNITS: 100 INJECTION, SOLUTION INTRAVENOUS; SUBCUTANEOUS at 08:08

## 2020-05-07 RX ADMIN — SODIUM CHLORIDE SCH UNITS: 4.5 INJECTION, SOLUTION INTRAVENOUS at 08:09

## 2020-05-07 RX ADMIN — LOPERAMIDE HYDROCHLORIDE PRN MG: 2 TABLET, FILM COATED ORAL at 03:34

## 2020-05-07 RX ADMIN — ACETAMINOPHEN PRN MG: 325 TABLET ORAL at 12:55

## 2020-05-07 RX ADMIN — SENNOSIDES SCH TAB: 8.6 TABLET, FILM COATED ORAL at 08:09

## 2020-05-07 RX ADMIN — SEVELAMER CARBONATE SCH MG: 800 TABLET, FILM COATED ORAL at 08:06

## 2020-05-07 RX ADMIN — INSULIN LISPRO SCH UNITS: 100 INJECTION, SOLUTION INTRAVENOUS; SUBCUTANEOUS at 16:57

## 2020-05-07 RX ADMIN — SEVELAMER CARBONATE SCH MG: 800 TABLET, FILM COATED ORAL at 12:55

## 2020-05-07 RX ADMIN — LEVOTHYROXINE SODIUM SCH MCG: 25 TABLET ORAL at 03:35

## 2020-05-07 RX ADMIN — LOPERAMIDE HYDROCHLORIDE PRN MG: 2 TABLET, FILM COATED ORAL at 08:07

## 2020-05-07 RX ADMIN — DIPHENHYDRAMINE HYDROCHLORIDE AND LIDOCAINE HYDROCHLORIDE AND ALUMINUM HYDROXIDE AND MAGNESIUM HYDRO PRN EA: KIT at 13:25

## 2020-05-07 RX ADMIN — ACETAMINOPHEN PRN MG: 325 TABLET ORAL at 03:34

## 2020-05-07 NOTE — DS.PDOC
Discharge Summary


General


Date of Admission


May 4, 2020 at 15:08


Date of Discharge


5/7/20


Primary Care Physician:  MESFIN KENT NP


Attending Physician:  Leida Wilkinson MD


Specialist/Consultants Involve:  CHRISTINE DAVID MD





Discharge Summary


HISTORY OF PRESENT ILLNESS: 


Patient is a 30 years old white female with past medical history of binge eating

disorder, bulimia nervosa, rheumatoid arthritis, herpes simplex, type 1 diabetes

mellitus, anemia, vitamin D deficiency, end-stage renal disease on hemodialysis,

and Saturdays was brought to ER by ambulance as patient's father were unable to 

wake her up in a. In the ER. As per records, patient said she woke up fine this 

morning, but she laid back and then she had a hard time. He cannot when she woke

up she had nausea, vomiting and also in ED, patient possibly had as tonic-clonic

seizure and was given Ativan 2 mg. I'm unable to obtain history from patient as 

she is sedated secondary to Ativan. History was obtained from ED M.D., RN 

ambulance records and and reviews patient's outpatient records from rheumatology

clinic.





HOSPITAL COURSE: 


Patient had no additional seizure activity after admission. Calcium elevated >13

on admission, Vit D supplement and calcium binders stopped. Nephrology followed 

closely. She underwent 2 additional sessions of HD without issues. BP remained 

high, carvedilol BID added to amlodipine 10 mg daily. By day of discharge, 

calcium was normalized and, although BP still slightly elevated, decision was 

made to discharge. Discussed with nephrology and they will f/u in office. She 

will be discharged with 2 additional days of levofloxacin for E. faecalis UTI. 

ROS neg as below. 





REVIEW OF SYSTEMS: 


CONSTITUTIONAL: Denies lack of energy, unexplained weight gain or weight loss, 

loss of appetite, fever, night sweats


EYES: Denies eye drainage, eye pain, visual changes, dry/irritated eye


EARS, NOSE, MOUTH, THROAT: Denies difficulty hearing, ringing in ears, mouth 

sores, loose teeth, sore throat, facial numbness or pain


NECK: Denies swollen glands 


CARDIOVASCULAR: Denies irregular heartbeat, racing heart, chest pains, swelling 

of feet or legs, pain in legs with walking


RESPIRATORY: Denies shortness of breath, night sweats, wheezing, sputum p

roduction, oxygen at home, coughing up blood, cough lasting > 1 month 


GASTROINTESTINAL: Denies abdominal pain, constipation, bloody stool, diarrhea, 

heartburn, nausea, vomiting


GENITOURINARY: Denies painful urination, bloody urine, frequent urination, 

urgency, leaking urine, impotence


MUSCULOSKELETAL: Denies joint pain, muscle pain, leg swelling 


INTEGUMENTARY: Denies rash, itching, new skin lesion, change in existing skin 

lesion, hair loss or increase, breast changes. 


NEUROLOGICAL: Denies headaches, dizziness, difficulty walking, numbness or 

tingling


PSYCHIATRIC: Denies depression, anxiety, recurrent bad thoughts, mood swings, 

hallucinations





PAST MEDICAL HISTORY: 


Binge eating disorder


Bulimia nervosa


Rheumatoid arthritis


Herpes simplex type 1 


Diabetes mellitus type I 


Anemia


Vitamin D deficiency


End-stage renal disease on hemodialysis





PAST SURGICAL HISTORY: 


EGD


Colonoscopy


Renal biopsy with nephritis 2019


Vas Cath placement in right upper chest





Family History


Father and mother both alive with no medical problems.


One sister has type 1 diabetes mellitus





Social History


* Smoker:  Denies


Alcohol:  Denies


Drugs:  denies





ALLERGIES: 


Please see below. 





DISCHARGE MEDICATIONS: 


Please see below.





PHYSICAL EXAMINATION: 


CONSTITUTIONAL: No acute distress, resting comfortably, AAO x 3


EYES: PERRLA, EOM intact


HENT, MOUTH: Normocephalic, atraumatic, moist mucous membranes


NECK: SUPPLE, no JVD, no lymphadenopathy, no carotid bruit


CV: Regular rate and rhythm, S1S2 normal, no murmurs/rubs/gallops


CHEST: Vas Cath in right upper chest 


RESPIRATORY: Clear to auscultation bilaterally, no rales/rhonchi/wheezes


GI:  BS positive in 4 quadrants, soft, nontender, nondistended, no rebound or 

guarding, no organomegaly


: Deferred


MUSCULOSKELETAL: Normal ROM. No cyanosis, clubbing, swelling, joint deformity, 

extremity edema


INTEGUMENTARY:  Intact, no rashes, no lesions, no erythema


NEUROLOGIC: Cranial Nerves II-XII are intact, no focal deficits


PSYCHIATRIC: Mood and affect are normal





CURRENT MEDICATIONS: Please see below 





LABORATORY DATA: Please see below 





IMAGING: 


Please see all under transcriptions 





ASSESSMENT: 31 y/o F admitted for hypertensive emergency, new onset seizure. 





PLAN: 





(1) Uncontrolled HTN. /100. C/w carvedilol BID with holding parameters, 

amlodipine at current dose. Hypertensive emergency on admission likely 2/2 to hy

percalcemia. 





(2) New onset seizure possbily 2/2 to hypercalcemia from ESRD vs. 

supplementation. No new seizures. Calcium wnl currently. Keep off calcium-based 

binders, vitamin D, and vitamin D analogs. 





(3) ESRD (end stage renal disease). HD tomorrow. Nephrology following. 





(4) UTI, E. faecalis. Treated with levofloxacin, continue 2 additional days. 





(5) Hypercalcemia, likely iatrogenic. Calcium now normal. Do not use calcium-

based binders, vitamin D, and vitamin D analogs. PCP, nephro to f/u calcium 

level as outpatient.


 


(6) Hypothyroidism.  Continue current dose of levothyroxine 25 mcg by mouth 

daily.





(7) Diabetes mellitus type I. BS controlled. C/w home meds, consistent carb 

diet. 








DISPOSITION: Discharged home in improved condition, f/u with PCP and nephrology 

as scheduled. 








TIME SPENT ON DISCHARGE: 35 minutes.





Vital Signs/I&Os





Vital Signs








  Date Time  Temp Pulse Resp B/P (MAP) Pulse Ox O2 Delivery O2 Flow Rate FiO2


 


5/7/20 13:24  97  156/100    


 


5/7/20 13:04 98.0  18  100 Room Air  














I&O- Last 24 Hours up to 6 AM 


 


 5/7/20





 06:00


 


Intake Total 960 ml


 


Balance 960 ml











Laboratory Data


Labs 24H


Laboratory Tests 2


5/6/20 16:46: Bedside Glucose (Misc Panel) 332H


5/6/20 21:18: Bedside Glucose (Misc Panel) 198H


5/7/20 05:20: 


Nucleated Red Blood Cells % (auto) 0.0, Anion Gap 8, Glomerular Filtration Rate 

6.3L, Calcium Level 9.2, Total Bilirubin 0.4, Aspartate Amino Transf (AST/SGOT) 

8, Alanine Aminotransferase (ALT/SGPT) 25, Alkaline Phosphatase 69, Total 

Protein 5.7L, Albumin 2.8L, Albumin/Globulin Ratio 0.97L


5/7/20 12:45: Bedside Glucose (Misc Panel) 149H


CBC/BMP


Laboratory Tests


5/7/20 05:20








FSBS





Laboratory Tests








Test


 5/6/20


16:46 5/6/20


21:18 5/7/20


12:45 Range/Units


 


 


Bedside Glucose (Misc Panel) 332 198 149   MG/DL











Microbiology





Microbiology


5/4/20 Gastrointestinal Tract Panel (PCR) - Final, Complete


         


5/4/20 Urine Culture - Final, Complete


         Enterococcus Faecalis





Discharge Medications


Scheduled


Amlodipine Besylate (Amlodipine Besylate) 10 Mg Tablet, 10 MG PO DAILY, 

(Reported)


Carvedilol (Carvedilol) 6.25 Mg Tablet, 6.25 MG PO BID


Famotidine (Famotidine) 20 Mg Tablet, 20 MG PO DAILY, (Reported)


Ferrous Sulfate (Ferrous Sulfate) 325 Mg Tablet, 325 MG PO BID, (Reported)


Folic Acid/Vit B Complex and C (Suki-Gail Tablet) 0.8 Mg Tablet, 0.8 MG PO 

DAILY, (Reported)


Hydroxychloroquine Sulfate (Hydroxychloroquine Sulfate) 200 Mg Tablet, 200 MG PO

BID, (Reported)


Insulin Glargine,Hum.rec.anlog (Basaglar Kwikpen U-100) 100 Unit/1 Ml 

Insuln.pen, 5 UNIT SC QHS, (Reported)


Insulin Lispro (Admelog Solostar) 100 Unit/1 Ml Insuln.pen, 1 DOSE SC AC, 

(Reported)


   SLIDING SCALE BASED ON CARB INTAKE 


Levothyroxine Sodium (Levothyroxine Sodium) 25 Mcg Tablet, 25 MCG PO DAILY, 

(Reported)


Sevelamer Carbonate (Sevelamer Carbonate) 800 Mg Tablet, 1,600 MG PO WM, 

(Reported)





Scheduled PRN


Acetaminophen (Tylenol) 325 Mg Tablet, 650 MG PO Q6HP PRN for PAIN, (Reported)


Lorazepam (Lorazepam) 1 Mg Tablet, 1 MG PO DAILY PRN for ANXIETY, (Reported)


   TAKE ONE TABLET PRN BEFORE DIALYSIS 





Allergies


Coded Allergies:  


     NSAIDS (Non-Steroidal Anti-Inflamma (Verified  Adverse Reaction, Severe, 

Kidney Failure, 2/3/20)











Leida Wilkinson MD             May 7, 2020 15:23

## 2020-05-08 NOTE — IPN
DATE OF SERVICE:  05/07/2020

 

SUBJECTIVE:

The patient was seen and examined at the bedside today morning during

hemodialysis.  She is tolerating the hemodialysis procedure well and blood

pressures are better controlled now.  She denies any active complaints.

 

OBJECTIVE:

Vital Signs:  Temperature is 98 degrees Fahrenheit, blood pressure 156/100, pulse

is 97, respiratory rate of 18, saturating 100% on room air.

Intake and Output.  There is no urine output recorded.  Weight in the bed scale

was 44 kg yesterday.

 

PHYSICAL EXAMINATION:

General:  The patient is awake, alert, oriented x3, laying in bed, in no apparent

distress.

Head and Neck Exam:  Extraocular muscles intact.  Pupils equally round and

reactive to light.  Mucous membranes are moist.  Neck is supple.  There is no

jugular venous distention (JVD).  She has a right internal jugular (IJ) tunneled

hemodialysis catheter.

Cardiovascular:  S1, S2, regular rate.  No edema of the bilateral lower

extremities.

Respiratory:  Chest is clear to auscultation bilaterally.  Bilateral equal air

entry.  No rales or rhonchi.

Abdomen:  Soft.  Positive bowel sounds.  Nontender.  No organomegaly.

Musculoskeletal:  No clubbing or cyanosis.  Pulses are 2+.

 

LAB REVIEW:

CBC showed a WBC of 7.2, hemoglobin 9.8, platelets 324.

BMP showed sodium 132, potassium 5.2, chloride 95, bicarb 29, BUN 36, creatinine

is 8, calcium 9.2, albumin is 2.8.

 

CURRENT INPATIENT MEDICATIONS:

The patient's medications were all reviewed by myself.  Her carvedilol dose has

been increased to 6.25 mg by mouth twice a day.  IV hydralazine has been stopped.

No other change in the medications today as compared with yesterday.

 

ASSESSMENT/PLAN:

1.  End-stage renal disease.  The patient is being dialyzed today.

Ultrafiltration goal will be 1 liter.

2.  Hypertension with end-stage renal disease.  The patient's blood pressures are

still elevated.  Her carvedilol dose was increased to 6.25 mg by mouth twice a

day.  She continues to be amlodipine.  Further adjustment in the antihypertensive

regimen will be done as outpatient.

3.  Hypercalcemia.  It is resolved now.

4.  Urinary tract infection.  The patient is currently on Levaquin which covers

Enterococcus faecalis infection.  She should continue at least 5 days of

Levaquin.

5.  Diabetes mellitus type 1.  Continue the insulin regimen.

6.  Disposition.  The patient is optimized from a nephrology standpoint to be

discharged home.  She will be followed up as outpatient at the dialysis center.

## 2020-05-28 ENCOUNTER — HOSPITAL ENCOUNTER (OUTPATIENT)
Dept: HOSPITAL 53 - M LAB REF | Age: 30
End: 2020-05-28
Attending: INTERNAL MEDICINE
Payer: MEDICARE

## 2020-05-28 DIAGNOSIS — R19.7: Primary | ICD-10-CM

## 2020-05-29 ENCOUNTER — HOSPITAL ENCOUNTER (OUTPATIENT)
Dept: HOSPITAL 53 - M RAD | Age: 30
End: 2020-05-29
Attending: NURSE PRACTITIONER
Payer: MEDICARE

## 2020-05-29 DIAGNOSIS — I15.0: ICD-10-CM

## 2020-05-29 DIAGNOSIS — E83.52: Primary | ICD-10-CM

## 2020-05-29 LAB — C DIFF TOX GENS STL QL NAA+PROBE: NEGATIVE

## 2020-05-29 NOTE — REP
CT ABDOMEN WITHOUT CONTRAST:

 

CT abdomen performed without IV contrast.  Sagittal and coronal reconstruction

images are performed.

 

The visualized lung bases are clear.  The liver, gallbladder, spleen, adrenals,

pancreas and kidneys are grossly unremarkable.  No radiopaque gallstones are

seen. No renal stones are seen.  The adrenal glands are normal.  There is no

hydronephrosis.  There is no abdominal aortic aneurysm.  I see no adenopathy.

There is no free air or free fluid.  No gross bowel abnormality is seen.

 

IMPRESSION:

 

Negative CT abdomen without contrast.

 

 

Electronically Signed by

Oswaldo Armstrong MD 05/29/2020 10:10 A

## 2020-06-15 ENCOUNTER — HOSPITAL ENCOUNTER (OUTPATIENT)
Dept: HOSPITAL 53 - M LAB | Age: 30
End: 2020-06-15
Attending: NURSE PRACTITIONER
Payer: MEDICARE

## 2020-06-15 DIAGNOSIS — I16.1: Primary | ICD-10-CM

## 2020-06-17 ENCOUNTER — HOSPITAL ENCOUNTER (OUTPATIENT)
Dept: HOSPITAL 53 - M LAB REF | Age: 30
End: 2020-06-17
Attending: INTERNAL MEDICINE
Payer: MEDICARE

## 2020-06-17 DIAGNOSIS — K59.1: ICD-10-CM

## 2020-06-17 DIAGNOSIS — E73.9: ICD-10-CM

## 2020-06-17 DIAGNOSIS — Z86.010: ICD-10-CM

## 2020-06-17 DIAGNOSIS — K21.0: ICD-10-CM

## 2020-06-17 DIAGNOSIS — A04.72: Primary | ICD-10-CM

## 2020-06-19 LAB
DOPAMINE SERPL-MCNC: 65 PG/ML (ref 0–48)
EPINEPH PLAS-MCNC: <15 PG/ML (ref 0–62)
NOREPINEPH PLAS-MCNC: 199 PG/ML (ref 0–874)

## 2020-06-25 LAB
CALPROTECTIN STL-MCNT: <16 UG/G (ref 0–120)
ELASTASE PANC STL-MCNT: 465 UG/G (ref 200–?)

## 2020-06-26 LAB — CHYMOTRYP STL-CCNT: 4.6 U/G (ref 2.3–51.4)

## 2020-08-28 ENCOUNTER — HOSPITAL ENCOUNTER (OUTPATIENT)
Dept: HOSPITAL 53 - M LAB | Age: 30
End: 2020-08-28
Attending: TRANSPLANT SURGERY
Payer: MEDICARE

## 2020-08-28 DIAGNOSIS — N18.5: Primary | ICD-10-CM

## 2020-08-28 DIAGNOSIS — Z94.0: ICD-10-CM

## 2020-08-28 DIAGNOSIS — Z79.899: ICD-10-CM

## 2020-08-28 DIAGNOSIS — D84.9: ICD-10-CM

## 2020-08-28 LAB
ALBUMIN SERPL BCG-MCNC: 3.6 GM/DL (ref 3.2–5.2)
APPEARANCE UR: CLEAR
BACTERIA UR QL AUTO: NEGATIVE
BASOPHILS # BLD AUTO: 0.1 10^3/UL (ref 0–0.2)
BASOPHILS NFR BLD AUTO: 0.8 % (ref 0–1)
BILIRUB UR QL STRIP.AUTO: NEGATIVE
BUN SERPL-MCNC: 24 MG/DL (ref 7–18)
CALCIUM SERPL-MCNC: 10.2 MG/DL (ref 8.5–10.1)
CAOX CRY URNS QL MICRO: (no result)
CHLORIDE SERPL-SCNC: 98 MEQ/L (ref 98–107)
CO2 SERPL-SCNC: 32 MEQ/L (ref 21–32)
CREAT SERPL-MCNC: 1.22 MG/DL (ref 0.55–1.3)
EOSINOPHIL # BLD AUTO: 0.2 10^3/UL (ref 0–0.5)
EOSINOPHIL NFR BLD AUTO: 3.1 % (ref 0–3)
GFR SERPL CREATININE-BSD FRML MDRD: 55.5 ML/MIN/{1.73_M2} (ref 60–?)
GLUCOSE SERPL-MCNC: 175 MG/DL (ref 70–100)
GLUCOSE UR QL STRIP.AUTO: (no result) MG/DL
HCT VFR BLD AUTO: 25.1 % (ref 36–47)
HGB BLD-MCNC: 8.4 G/DL (ref 12–15.5)
HGB UR QL STRIP.AUTO: NEGATIVE
KETONES UR QL STRIP.AUTO: NEGATIVE MG/DL
LEUKOCYTE ESTERASE UR QL STRIP.AUTO: (no result)
LYMPHOCYTES # BLD AUTO: 1.4 10^3/UL (ref 1.5–5)
LYMPHOCYTES NFR BLD AUTO: 23.1 % (ref 24–44)
MAGNESIUM SERPL-MCNC: 1.9 MG/DL (ref 1.8–2.4)
MCH RBC QN AUTO: 35.1 PG (ref 27–33)
MCHC RBC AUTO-ENTMCNC: 33.5 G/DL (ref 32–36.5)
MCV RBC AUTO: 105 FL (ref 80–96)
MONOCYTES # BLD AUTO: 0.4 10^3/UL (ref 0–0.8)
MONOCYTES NFR BLD AUTO: 5.8 % (ref 0–5)
MUCOUS THREADS URNS QL MICRO: (no result)
NEUTROPHILS # BLD AUTO: 4 10^3/UL (ref 1.5–8.5)
NEUTROPHILS NFR BLD AUTO: 66.9 % (ref 36–66)
NITRITE UR QL STRIP.AUTO: NEGATIVE
PH UR STRIP.AUTO: 6 UNITS (ref 5–9)
PHOSPHATE SERPL-MCNC: 2.8 MG/DL (ref 2.5–4.9)
PLATELET # BLD AUTO: 450 10^3/UL (ref 150–450)
POTASSIUM SERPL-SCNC: 4.8 MEQ/L (ref 3.5–5.1)
PROT UR QL STRIP.AUTO: NEGATIVE MG/DL
RBC # BLD AUTO: 2.39 10^6/UL (ref 4–5.4)
RBC # UR AUTO: 2 /HPF (ref 0–3)
SODIUM SERPL-SCNC: 137 MEQ/L (ref 136–145)
SP GR UR STRIP.AUTO: 1.01 (ref 1–1.03)
SQUAMOUS #/AREA URNS AUTO: 0 /HPF (ref 0–6)
UROBILINOGEN UR QL STRIP.AUTO: 0.2 MG/DL (ref 0–2)
WBC # BLD AUTO: 6.1 10^3/UL (ref 4–10)
WBC #/AREA URNS AUTO: 3 /HPF (ref 0–3)

## 2020-08-31 ENCOUNTER — HOSPITAL ENCOUNTER (OUTPATIENT)
Dept: HOSPITAL 53 - M LAB | Age: 30
End: 2020-08-31
Payer: MEDICARE

## 2020-08-31 DIAGNOSIS — N18.5: ICD-10-CM

## 2020-08-31 DIAGNOSIS — Z94.0: Primary | ICD-10-CM

## 2020-08-31 DIAGNOSIS — D84.9: ICD-10-CM

## 2020-08-31 DIAGNOSIS — Z79.899: ICD-10-CM

## 2020-08-31 LAB
ALBUMIN SERPL BCG-MCNC: 3.6 GM/DL (ref 3.2–5.2)
APPEARANCE UR: (no result)
BACTERIA UR QL AUTO: NEGATIVE
BASOPHILS # BLD AUTO: 0 10^3/UL (ref 0–0.2)
BASOPHILS NFR BLD AUTO: 0.8 % (ref 0–1)
BILIRUB UR QL STRIP.AUTO: NEGATIVE
BUN SERPL-MCNC: 19 MG/DL (ref 7–18)
CALCIUM SERPL-MCNC: 9.6 MG/DL (ref 8.5–10.1)
CHLORIDE SERPL-SCNC: 99 MEQ/L (ref 98–107)
CO2 SERPL-SCNC: 32 MEQ/L (ref 21–32)
CREAT SERPL-MCNC: 1.05 MG/DL (ref 0.55–1.3)
CREAT UR-MCNC: 32.4 MG/DL
EOSINOPHIL # BLD AUTO: 0.1 10^3/UL (ref 0–0.5)
EOSINOPHIL NFR BLD AUTO: 2.7 % (ref 0–3)
GFR SERPL CREATININE-BSD FRML MDRD: > 60 ML/MIN/{1.73_M2} (ref 60–?)
GLUCOSE SERPL-MCNC: 124 MG/DL (ref 70–100)
GLUCOSE UR QL STRIP.AUTO: (no result) MG/DL
HCT VFR BLD AUTO: 28.9 % (ref 36–47)
HGB BLD-MCNC: 9.6 G/DL (ref 12–15.5)
HGB UR QL STRIP.AUTO: NEGATIVE
KETONES UR QL STRIP.AUTO: NEGATIVE MG/DL
LEUKOCYTE ESTERASE UR QL STRIP.AUTO: (no result)
LYMPHOCYTES # BLD AUTO: 1.4 10^3/UL (ref 1.5–5)
LYMPHOCYTES NFR BLD AUTO: 29.1 % (ref 24–44)
MAGNESIUM SERPL-MCNC: 1.7 MG/DL (ref 1.8–2.4)
MCH RBC QN AUTO: 35 PG (ref 27–33)
MCHC RBC AUTO-ENTMCNC: 33.2 G/DL (ref 32–36.5)
MCV RBC AUTO: 105.5 FL (ref 80–96)
MONOCYTES # BLD AUTO: 0.4 10^3/UL (ref 0–0.8)
MONOCYTES NFR BLD AUTO: 8.2 % (ref 0–5)
NEUTROPHILS # BLD AUTO: 2.9 10^3/UL (ref 1.5–8.5)
NEUTROPHILS NFR BLD AUTO: 59 % (ref 36–66)
NITRITE UR QL STRIP.AUTO: NEGATIVE
PH UR STRIP.AUTO: 7 UNITS (ref 5–9)
PHOSPHATE SERPL-MCNC: 2.3 MG/DL (ref 2.5–4.9)
PLATELET # BLD AUTO: 515 10^3/UL (ref 150–450)
POTASSIUM SERPL-SCNC: 4.7 MEQ/L (ref 3.5–5.1)
PROT UR QL STRIP.AUTO: NEGATIVE MG/DL
PROT UR-MCNC: 15.9 MG/DL (ref 0–12)
RBC # BLD AUTO: 2.74 10^6/UL (ref 4–5.4)
RBC # UR AUTO: 1 /HPF (ref 0–3)
SODIUM SERPL-SCNC: 138 MEQ/L (ref 136–145)
SP GR UR STRIP.AUTO: 1.01 (ref 1–1.03)
SQUAMOUS #/AREA URNS AUTO: 0 /HPF (ref 0–6)
UROBILINOGEN UR QL STRIP.AUTO: 0.2 MG/DL (ref 0–2)
WBC # BLD AUTO: 4.9 10^3/UL (ref 4–10)
WBC #/AREA URNS AUTO: 9 /HPF (ref 0–3)

## 2020-09-03 ENCOUNTER — HOSPITAL ENCOUNTER (OUTPATIENT)
Dept: HOSPITAL 53 - M LAB | Age: 30
End: 2020-09-03
Attending: TRANSPLANT SURGERY
Payer: MEDICARE

## 2020-09-03 DIAGNOSIS — N18.5: Primary | ICD-10-CM

## 2020-09-03 DIAGNOSIS — Z94.0: ICD-10-CM

## 2020-09-03 DIAGNOSIS — D84.9: ICD-10-CM

## 2020-09-03 DIAGNOSIS — Z79.899: ICD-10-CM

## 2020-09-03 LAB
ALBUMIN SERPL BCG-MCNC: 3.5 GM/DL (ref 3.2–5.2)
APPEARANCE UR: CLEAR
BACTERIA UR QL AUTO: (no result)
BASOPHILS # BLD AUTO: 0.1 10^3/UL (ref 0–0.2)
BASOPHILS NFR BLD AUTO: 0.6 % (ref 0–1)
BILIRUB UR QL STRIP.AUTO: NEGATIVE
BUN SERPL-MCNC: 19 MG/DL (ref 7–18)
CALCIUM SERPL-MCNC: 9.2 MG/DL (ref 8.5–10.1)
CHLORIDE SERPL-SCNC: 98 MEQ/L (ref 98–107)
CO2 SERPL-SCNC: 33 MEQ/L (ref 21–32)
CREAT SERPL-MCNC: 1.09 MG/DL (ref 0.55–1.3)
CREAT UR-MCNC: 26.1 MG/DL
EOSINOPHIL # BLD AUTO: 0.2 10^3/UL (ref 0–0.5)
EOSINOPHIL NFR BLD AUTO: 1.9 % (ref 0–3)
GFR SERPL CREATININE-BSD FRML MDRD: > 60 ML/MIN/{1.73_M2} (ref 60–?)
GLUCOSE SERPL-MCNC: 138 MG/DL (ref 70–100)
GLUCOSE UR QL STRIP.AUTO: NEGATIVE MG/DL
HCT VFR BLD AUTO: 28.7 % (ref 36–47)
HGB BLD-MCNC: 9.4 G/DL (ref 12–15.5)
HGB UR QL STRIP.AUTO: NEGATIVE
KETONES UR QL STRIP.AUTO: NEGATIVE MG/DL
LEUKOCYTE ESTERASE UR QL STRIP.AUTO: NEGATIVE
LYMPHOCYTES # BLD AUTO: 1.8 10^3/UL (ref 1.5–5)
LYMPHOCYTES NFR BLD AUTO: 21.1 % (ref 24–44)
MAGNESIUM SERPL-MCNC: 2.1 MG/DL (ref 1.8–2.4)
MCH RBC QN AUTO: 34.4 PG (ref 27–33)
MCHC RBC AUTO-ENTMCNC: 32.8 G/DL (ref 32–36.5)
MCV RBC AUTO: 105.1 FL (ref 80–96)
MONOCYTES # BLD AUTO: 0.8 10^3/UL (ref 0–0.8)
MONOCYTES NFR BLD AUTO: 9.2 % (ref 0–5)
NEUTROPHILS # BLD AUTO: 5.8 10^3/UL (ref 1.5–8.5)
NEUTROPHILS NFR BLD AUTO: 66.9 % (ref 36–66)
NITRITE UR QL STRIP.AUTO: POSITIVE
PH UR STRIP.AUTO: 7 UNITS (ref 5–9)
PHOSPHATE SERPL-MCNC: 2.7 MG/DL (ref 2.5–4.9)
PLATELET # BLD AUTO: 529 10^3/UL (ref 150–450)
POTASSIUM SERPL-SCNC: 3.8 MEQ/L (ref 3.5–5.1)
PROT UR QL STRIP.AUTO: NEGATIVE MG/DL
PROT UR-MCNC: 13.3 MG/DL (ref 0–12)
RBC # BLD AUTO: 2.73 10^6/UL (ref 4–5.4)
RBC # UR AUTO: 1 /HPF (ref 0–3)
SODIUM SERPL-SCNC: 136 MEQ/L (ref 136–145)
SP GR UR STRIP.AUTO: 1.01 (ref 1–1.03)
SQUAMOUS #/AREA URNS AUTO: 0 /HPF (ref 0–6)
UROBILINOGEN UR QL STRIP.AUTO: 0.2 MG/DL (ref 0–2)
WBC # BLD AUTO: 8.7 10^3/UL (ref 4–10)
WBC #/AREA URNS AUTO: 6 /HPF (ref 0–3)

## 2020-09-10 ENCOUNTER — HOSPITAL ENCOUNTER (OUTPATIENT)
Dept: HOSPITAL 53 - M LAB | Age: 30
End: 2020-09-10
Attending: TRANSPLANT SURGERY
Payer: MEDICARE

## 2020-09-10 DIAGNOSIS — D84.9: ICD-10-CM

## 2020-09-10 DIAGNOSIS — Z94.0: ICD-10-CM

## 2020-09-10 DIAGNOSIS — Z79.899: ICD-10-CM

## 2020-09-10 DIAGNOSIS — N18.5: Primary | ICD-10-CM

## 2020-09-10 LAB
ALBUMIN SERPL BCG-MCNC: 3.5 GM/DL (ref 3.2–5.2)
APPEARANCE UR: (no result)
BACTERIA UR QL AUTO: (no result)
BASOPHILS # BLD AUTO: 0.1 10^3/UL (ref 0–0.2)
BASOPHILS NFR BLD AUTO: 1 % (ref 0–1)
BILIRUB UR QL STRIP.AUTO: NEGATIVE
BUN SERPL-MCNC: 18 MG/DL (ref 7–18)
CALCIUM SERPL-MCNC: 9.8 MG/DL (ref 8.5–10.1)
CAOX CRY URNS QL MICRO: (no result)
CHLORIDE SERPL-SCNC: 101 MEQ/L (ref 98–107)
CO2 SERPL-SCNC: 32 MEQ/L (ref 21–32)
CREAT SERPL-MCNC: 1.12 MG/DL (ref 0.55–1.3)
CREAT UR-MCNC: 87.8 MG/DL
EOSINOPHIL # BLD AUTO: 0.1 10^3/UL (ref 0–0.5)
EOSINOPHIL NFR BLD AUTO: 2.3 % (ref 0–3)
GFR SERPL CREATININE-BSD FRML MDRD: > 60 ML/MIN/{1.73_M2} (ref 60–?)
GLUCOSE SERPL-MCNC: 149 MG/DL (ref 70–100)
GLUCOSE UR QL STRIP.AUTO: (no result) MG/DL
HCT VFR BLD AUTO: 28.3 % (ref 36–47)
HGB BLD-MCNC: 9.5 G/DL (ref 12–15.5)
HGB UR QL STRIP.AUTO: NEGATIVE
KETONES UR QL STRIP.AUTO: NEGATIVE MG/DL
LEUKOCYTE ESTERASE UR QL STRIP.AUTO: NEGATIVE
LYMPHOCYTES # BLD AUTO: 1.6 10^3/UL (ref 1.5–5)
LYMPHOCYTES NFR BLD AUTO: 30.4 % (ref 24–44)
MAGNESIUM SERPL-MCNC: 1.9 MG/DL (ref 1.8–2.4)
MCH RBC QN AUTO: 35.3 PG (ref 27–33)
MCHC RBC AUTO-ENTMCNC: 33.6 G/DL (ref 32–36.5)
MCV RBC AUTO: 105.2 FL (ref 80–96)
MONOCYTES # BLD AUTO: 0.4 10^3/UL (ref 0–0.8)
MONOCYTES NFR BLD AUTO: 7.9 % (ref 0–5)
MUCOUS THREADS URNS QL MICRO: (no result)
NEUTROPHILS # BLD AUTO: 3 10^3/UL (ref 1.5–8.5)
NEUTROPHILS NFR BLD AUTO: 58 % (ref 36–66)
NITRITE UR QL STRIP.AUTO: NEGATIVE
PH UR STRIP.AUTO: 6 UNITS (ref 5–9)
PHOSPHATE SERPL-MCNC: 2.9 MG/DL (ref 2.5–4.9)
PLATELET # BLD AUTO: 539 10^3/UL (ref 150–450)
POTASSIUM SERPL-SCNC: 4.2 MEQ/L (ref 3.5–5.1)
PROT UR QL STRIP.AUTO: NEGATIVE MG/DL
PROT UR-MCNC: 36.2 MG/DL (ref 0–12)
RBC # BLD AUTO: 2.69 10^6/UL (ref 4–5.4)
RBC # UR AUTO: 4 /HPF (ref 0–3)
SODIUM SERPL-SCNC: 136 MEQ/L (ref 136–145)
SP GR UR STRIP.AUTO: 1.02 (ref 1–1.03)
SQUAMOUS #/AREA URNS AUTO: 0 /HPF (ref 0–6)
UROBILINOGEN UR QL STRIP.AUTO: 0.2 MG/DL (ref 0–2)
WBC # BLD AUTO: 5.2 10^3/UL (ref 4–10)
WBC #/AREA URNS AUTO: 12 /HPF (ref 0–3)

## 2020-09-14 ENCOUNTER — HOSPITAL ENCOUNTER (OUTPATIENT)
Dept: HOSPITAL 53 - M LAB | Age: 30
End: 2020-09-14
Attending: TRANSPLANT SURGERY
Payer: MEDICARE

## 2020-09-14 DIAGNOSIS — Z79.899: ICD-10-CM

## 2020-09-14 DIAGNOSIS — Z94.0: ICD-10-CM

## 2020-09-14 DIAGNOSIS — D84.9: Primary | ICD-10-CM

## 2020-09-14 LAB
ALBUMIN SERPL BCG-MCNC: 3.3 GM/DL (ref 3.2–5.2)
APPEARANCE UR: (no result)
BACTERIA UR QL AUTO: (no result)
BASOPHILS # BLD AUTO: 0.1 10^3/UL (ref 0–0.2)
BASOPHILS NFR BLD AUTO: 0.8 % (ref 0–1)
BILIRUB UR QL STRIP.AUTO: NEGATIVE
BUN SERPL-MCNC: 22 MG/DL (ref 7–18)
CALCIUM SERPL-MCNC: 9.4 MG/DL (ref 8.5–10.1)
CHLORIDE SERPL-SCNC: 101 MEQ/L (ref 98–107)
CO2 SERPL-SCNC: 31 MEQ/L (ref 21–32)
CREAT SERPL-MCNC: 1.07 MG/DL (ref 0.55–1.3)
CREAT UR-MCNC: 52.6 MG/DL
EOSINOPHIL # BLD AUTO: 0.1 10^3/UL (ref 0–0.5)
EOSINOPHIL NFR BLD AUTO: 1.9 % (ref 0–3)
GFR SERPL CREATININE-BSD FRML MDRD: > 60 ML/MIN/{1.73_M2} (ref 60–?)
GLUCOSE SERPL-MCNC: 247 MG/DL (ref 70–100)
GLUCOSE UR QL STRIP.AUTO: (no result) MG/DL
HCT VFR BLD AUTO: 26.6 % (ref 36–47)
HGB BLD-MCNC: 8.8 G/DL (ref 12–15.5)
HGB UR QL STRIP.AUTO: NEGATIVE
KETONES UR QL STRIP.AUTO: NEGATIVE MG/DL
LEUKOCYTE ESTERASE UR QL STRIP.AUTO: (no result)
LYMPHOCYTES # BLD AUTO: 1.3 10^3/UL (ref 1.5–5)
LYMPHOCYTES NFR BLD AUTO: 19.6 % (ref 24–44)
MAGNESIUM SERPL-MCNC: 1.7 MG/DL (ref 1.8–2.4)
MCH RBC QN AUTO: 35.2 PG (ref 27–33)
MCHC RBC AUTO-ENTMCNC: 33.1 G/DL (ref 32–36.5)
MCV RBC AUTO: 106.4 FL (ref 80–96)
MONOCYTES # BLD AUTO: 0.5 10^3/UL (ref 0–0.8)
MONOCYTES NFR BLD AUTO: 7.8 % (ref 0–5)
NEUTROPHILS # BLD AUTO: 4.5 10^3/UL (ref 1.5–8.5)
NEUTROPHILS NFR BLD AUTO: 69.3 % (ref 36–66)
NITRITE UR QL STRIP.AUTO: NEGATIVE
PH UR STRIP.AUTO: 6 UNITS (ref 5–9)
PHOSPHATE SERPL-MCNC: 2.1 MG/DL (ref 2.5–4.9)
PLATELET # BLD AUTO: 492 10^3/UL (ref 150–450)
POTASSIUM SERPL-SCNC: 3.7 MEQ/L (ref 3.5–5.1)
PROT UR QL STRIP.AUTO: NEGATIVE MG/DL
PROT UR-MCNC: 32.6 MG/DL (ref 0–12)
RBC # BLD AUTO: 2.5 10^6/UL (ref 4–5.4)
RBC # UR AUTO: 6 /HPF (ref 0–3)
SODIUM SERPL-SCNC: 135 MEQ/L (ref 136–145)
SP GR UR STRIP.AUTO: 1.02 (ref 1–1.03)
SQUAMOUS #/AREA URNS AUTO: 0 /HPF (ref 0–6)
UROBILINOGEN UR QL STRIP.AUTO: 0.2 MG/DL (ref 0–2)
WBC # BLD AUTO: 6.4 10^3/UL (ref 4–10)
WBC #/AREA URNS AUTO: 27 /HPF (ref 0–3)

## 2020-09-17 ENCOUNTER — HOSPITAL ENCOUNTER (OUTPATIENT)
Dept: HOSPITAL 53 - M LAB | Age: 30
End: 2020-09-17
Attending: TRANSPLANT SURGERY
Payer: MEDICARE

## 2020-09-17 DIAGNOSIS — D84.9: Primary | ICD-10-CM

## 2020-09-17 DIAGNOSIS — N18.5: ICD-10-CM

## 2020-09-17 DIAGNOSIS — Z94.0: ICD-10-CM

## 2020-09-17 DIAGNOSIS — Z79.899: ICD-10-CM

## 2020-09-17 LAB
ALBUMIN SERPL BCG-MCNC: 3.6 GM/DL (ref 3.2–5.2)
APPEARANCE UR: (no result)
BACTERIA UR QL AUTO: (no result)
BASOPHILS # BLD AUTO: 0.1 10^3/UL (ref 0–0.2)
BASOPHILS NFR BLD AUTO: 0.8 % (ref 0–1)
BILIRUB UR QL STRIP.AUTO: NEGATIVE
BUN SERPL-MCNC: 23 MG/DL (ref 7–18)
CALCIUM SERPL-MCNC: 9.9 MG/DL (ref 8.5–10.1)
CHLORIDE SERPL-SCNC: 97 MEQ/L (ref 98–107)
CO2 SERPL-SCNC: 31 MEQ/L (ref 21–32)
CREAT SERPL-MCNC: 1.23 MG/DL (ref 0.55–1.3)
CREAT UR-MCNC: 41.2 MG/DL
EOSINOPHIL # BLD AUTO: 0.1 10^3/UL (ref 0–0.5)
EOSINOPHIL NFR BLD AUTO: 2.3 % (ref 0–3)
GFR SERPL CREATININE-BSD FRML MDRD: 55 ML/MIN/{1.73_M2} (ref 60–?)
GLUCOSE SERPL-MCNC: 330 MG/DL (ref 70–100)
GLUCOSE UR QL STRIP.AUTO: (no result) MG/DL
HCT VFR BLD AUTO: 27.5 % (ref 36–47)
HGB BLD-MCNC: 9.2 G/DL (ref 12–15.5)
HGB UR QL STRIP.AUTO: NEGATIVE
KETONES UR QL STRIP.AUTO: NEGATIVE MG/DL
LEUKOCYTE ESTERASE UR QL STRIP.AUTO: (no result)
LYMPHOCYTES # BLD AUTO: 1.4 10^3/UL (ref 1.5–5)
LYMPHOCYTES NFR BLD AUTO: 23.8 % (ref 24–44)
MAGNESIUM SERPL-MCNC: 1.8 MG/DL (ref 1.8–2.4)
MCH RBC QN AUTO: 35.1 PG (ref 27–33)
MCHC RBC AUTO-ENTMCNC: 33.5 G/DL (ref 32–36.5)
MCV RBC AUTO: 105 FL (ref 80–96)
MONOCYTES # BLD AUTO: 0.6 10^3/UL (ref 0–0.8)
MONOCYTES NFR BLD AUTO: 9.1 % (ref 0–5)
NEUTROPHILS # BLD AUTO: 3.8 10^3/UL (ref 1.5–8.5)
NEUTROPHILS NFR BLD AUTO: 63.7 % (ref 36–66)
NITRITE UR QL STRIP.AUTO: NEGATIVE
PH UR STRIP.AUTO: 6 UNITS (ref 5–9)
PHOSPHATE SERPL-MCNC: 2.9 MG/DL (ref 2.5–4.9)
PLATELET # BLD AUTO: 493 10^3/UL (ref 150–450)
POTASSIUM SERPL-SCNC: 4.9 MEQ/L (ref 3.5–5.1)
PROT UR QL STRIP.AUTO: NEGATIVE MG/DL
PROT UR-MCNC: 23 MG/DL (ref 0–12)
RBC # BLD AUTO: 2.62 10^6/UL (ref 4–5.4)
RBC # UR AUTO: 2 /HPF (ref 0–3)
SODIUM SERPL-SCNC: 135 MEQ/L (ref 136–145)
SP GR UR STRIP.AUTO: 1.01 (ref 1–1.03)
SQUAMOUS #/AREA URNS AUTO: 1 /HPF (ref 0–6)
UROBILINOGEN UR QL STRIP.AUTO: 0.2 MG/DL (ref 0–2)
WBC # BLD AUTO: 6 10^3/UL (ref 4–10)
WBC #/AREA URNS AUTO: 12 /HPF (ref 0–3)

## 2020-09-21 ENCOUNTER — HOSPITAL ENCOUNTER (OUTPATIENT)
Dept: HOSPITAL 53 - M LAB | Age: 30
End: 2020-09-21
Attending: TRANSPLANT SURGERY
Payer: MEDICARE

## 2020-09-21 DIAGNOSIS — Z79.899: ICD-10-CM

## 2020-09-21 DIAGNOSIS — N18.5: Primary | ICD-10-CM

## 2020-09-21 DIAGNOSIS — Z94.0: ICD-10-CM

## 2020-09-21 DIAGNOSIS — D84.9: ICD-10-CM

## 2020-09-21 LAB
ALBUMIN SERPL BCG-MCNC: 3.6 GM/DL (ref 3.2–5.2)
APPEARANCE UR: (no result)
BACTERIA UR QL AUTO: (no result)
BASOPHILS # BLD AUTO: 0 10^3/UL (ref 0–0.2)
BASOPHILS NFR BLD AUTO: 0.7 % (ref 0–1)
BILIRUB UR QL STRIP.AUTO: NEGATIVE
BUN SERPL-MCNC: 27 MG/DL (ref 7–18)
CALCIUM SERPL-MCNC: 9.7 MG/DL (ref 8.5–10.1)
CAOX CRY URNS QL MICRO: (no result)
CHLORIDE SERPL-SCNC: 100 MEQ/L (ref 98–107)
CO2 SERPL-SCNC: 30 MEQ/L (ref 21–32)
CREAT SERPL-MCNC: 1.3 MG/DL (ref 0.55–1.3)
CREAT UR-MCNC: 59.5 MG/DL
EOSINOPHIL # BLD AUTO: 0.1 10^3/UL (ref 0–0.5)
EOSINOPHIL NFR BLD AUTO: 1.8 % (ref 0–3)
GFR SERPL CREATININE-BSD FRML MDRD: 51.6 ML/MIN/{1.73_M2} (ref 60–?)
GLUCOSE SERPL-MCNC: 224 MG/DL (ref 70–100)
GLUCOSE UR QL STRIP.AUTO: (no result) MG/DL
HCT VFR BLD AUTO: 32.5 % (ref 36–47)
HGB BLD-MCNC: 10.8 G/DL (ref 12–15.5)
HGB UR QL STRIP.AUTO: NEGATIVE
KETONES UR QL STRIP.AUTO: (no result) MG/DL
LEUKOCYTE ESTERASE UR QL STRIP.AUTO: (no result)
LYMPHOCYTES # BLD AUTO: 1.6 10^3/UL (ref 1.5–5)
LYMPHOCYTES NFR BLD AUTO: 28 % (ref 24–44)
MAGNESIUM SERPL-MCNC: 2 MG/DL (ref 1.8–2.4)
MCH RBC QN AUTO: 35.1 PG (ref 27–33)
MCHC RBC AUTO-ENTMCNC: 33.2 G/DL (ref 32–36.5)
MCV RBC AUTO: 105.5 FL (ref 80–96)
MONOCYTES # BLD AUTO: 0.5 10^3/UL (ref 0–0.8)
MONOCYTES NFR BLD AUTO: 9.3 % (ref 0–5)
MUCOUS THREADS URNS QL MICRO: (no result)
NEUTROPHILS # BLD AUTO: 3.4 10^3/UL (ref 1.5–8.5)
NEUTROPHILS NFR BLD AUTO: 59.8 % (ref 36–66)
NITRITE UR QL STRIP.AUTO: POSITIVE
PH UR STRIP.AUTO: 6 UNITS (ref 5–9)
PHOSPHATE SERPL-MCNC: 2.6 MG/DL (ref 2.5–4.9)
PLATELET # BLD AUTO: 542 10^3/UL (ref 150–450)
POTASSIUM SERPL-SCNC: 4.6 MEQ/L (ref 3.5–5.1)
PROT UR QL STRIP.AUTO: NEGATIVE MG/DL
PROT UR-MCNC: 23 MG/DL (ref 0–12)
RBC # BLD AUTO: 3.08 10^6/UL (ref 4–5.4)
RBC # UR AUTO: 5 /HPF (ref 0–3)
SODIUM SERPL-SCNC: 136 MEQ/L (ref 136–145)
SP GR UR STRIP.AUTO: 1.01 (ref 1–1.03)
SQUAMOUS #/AREA URNS AUTO: 3 /HPF (ref 0–6)
UROBILINOGEN UR QL STRIP.AUTO: 0.2 MG/DL (ref 0–2)
WBC # BLD AUTO: 5.7 10^3/UL (ref 4–10)
WBC #/AREA URNS AUTO: 46 /HPF (ref 0–3)

## 2020-09-24 ENCOUNTER — HOSPITAL ENCOUNTER (OUTPATIENT)
Dept: HOSPITAL 53 - M LAB | Age: 30
End: 2020-09-24
Attending: TRANSPLANT SURGERY
Payer: MEDICARE

## 2020-09-24 DIAGNOSIS — N18.5: ICD-10-CM

## 2020-09-24 DIAGNOSIS — Z79.899: ICD-10-CM

## 2020-09-24 DIAGNOSIS — D84.9: ICD-10-CM

## 2020-09-24 DIAGNOSIS — Z94.0: Primary | ICD-10-CM

## 2020-09-24 LAB
ALBUMIN SERPL BCG-MCNC: 3.4 GM/DL (ref 3.2–5.2)
APPEARANCE UR: (no result)
BACTERIA UR QL AUTO: NEGATIVE
BASOPHILS # BLD AUTO: 0.1 10^3/UL (ref 0–0.2)
BASOPHILS NFR BLD AUTO: 1 % (ref 0–1)
BILIRUB UR QL STRIP.AUTO: NEGATIVE
BUN SERPL-MCNC: 26 MG/DL (ref 7–18)
CALCIUM SERPL-MCNC: 9.3 MG/DL (ref 8.5–10.1)
CHLORIDE SERPL-SCNC: 101 MEQ/L (ref 98–107)
CO2 SERPL-SCNC: 32 MEQ/L (ref 21–32)
CREAT SERPL-MCNC: 1.39 MG/DL (ref 0.55–1.3)
CREAT UR-MCNC: 52.1 MG/DL
EOSINOPHIL # BLD AUTO: 0.2 10^3/UL (ref 0–0.5)
EOSINOPHIL NFR BLD AUTO: 3.4 % (ref 0–3)
GFR SERPL CREATININE-BSD FRML MDRD: 47.7 ML/MIN/{1.73_M2} (ref 60–?)
GLUCOSE SERPL-MCNC: 205 MG/DL (ref 70–100)
GLUCOSE UR QL STRIP.AUTO: (no result) MG/DL
HCT VFR BLD AUTO: 31.6 % (ref 36–47)
HGB BLD-MCNC: 10.3 G/DL (ref 12–15.5)
HGB UR QL STRIP.AUTO: NEGATIVE
KETONES UR QL STRIP.AUTO: NEGATIVE MG/DL
LEUKOCYTE ESTERASE UR QL STRIP.AUTO: (no result)
LYMPHOCYTES # BLD AUTO: 2.2 10^3/UL (ref 1.5–5)
LYMPHOCYTES NFR BLD AUTO: 35.6 % (ref 24–44)
MAGNESIUM SERPL-MCNC: 1.9 MG/DL (ref 1.8–2.4)
MCH RBC QN AUTO: 34.3 PG (ref 27–33)
MCHC RBC AUTO-ENTMCNC: 32.6 G/DL (ref 32–36.5)
MCV RBC AUTO: 105.3 FL (ref 80–96)
MONOCYTES # BLD AUTO: 0.5 10^3/UL (ref 0–0.8)
MONOCYTES NFR BLD AUTO: 8 % (ref 0–5)
MUCOUS THREADS URNS QL MICRO: (no result)
NEUTROPHILS # BLD AUTO: 3.3 10^3/UL (ref 1.5–8.5)
NEUTROPHILS NFR BLD AUTO: 51.8 % (ref 36–66)
NITRITE UR QL STRIP.AUTO: NEGATIVE
PH UR STRIP.AUTO: 5 UNITS (ref 5–9)
PHOSPHATE SERPL-MCNC: 3 MG/DL (ref 2.5–4.9)
PLATELET # BLD AUTO: 492 10^3/UL (ref 150–450)
POTASSIUM SERPL-SCNC: 4.3 MEQ/L (ref 3.5–5.1)
PROT UR QL STRIP.AUTO: NEGATIVE MG/DL
PROT UR-MCNC: 20.8 MG/DL (ref 0–12)
RBC # BLD AUTO: 3 10^6/UL (ref 4–5.4)
RBC # UR AUTO: 4 /HPF (ref 0–3)
SODIUM SERPL-SCNC: 137 MEQ/L (ref 136–145)
SP GR UR STRIP.AUTO: 1.01 (ref 1–1.03)
SQUAMOUS #/AREA URNS AUTO: 5 /HPF (ref 0–6)
UROBILINOGEN UR QL STRIP.AUTO: 0.2 MG/DL (ref 0–2)
WBC # BLD AUTO: 6.3 10^3/UL (ref 4–10)
WBC #/AREA URNS AUTO: 6 /HPF (ref 0–3)

## 2020-09-28 ENCOUNTER — HOSPITAL ENCOUNTER (OUTPATIENT)
Dept: HOSPITAL 53 - M LAB | Age: 30
End: 2020-09-28
Attending: TRANSPLANT SURGERY
Payer: MEDICARE

## 2020-09-28 DIAGNOSIS — Z79.899: ICD-10-CM

## 2020-09-28 DIAGNOSIS — Z94.0: ICD-10-CM

## 2020-09-28 DIAGNOSIS — N18.5: ICD-10-CM

## 2020-09-28 DIAGNOSIS — D84.9: Primary | ICD-10-CM

## 2020-09-28 LAB
ALBUMIN SERPL BCG-MCNC: 3.6 GM/DL (ref 3.2–5.2)
APPEARANCE UR: CLEAR
BACTERIA UR QL AUTO: (no result)
BASOPHILS # BLD AUTO: 0.1 10^3/UL (ref 0–0.2)
BASOPHILS NFR BLD AUTO: 1 % (ref 0–1)
BILIRUB UR QL STRIP.AUTO: NEGATIVE
BUN SERPL-MCNC: 15 MG/DL (ref 7–18)
CALCIUM SERPL-MCNC: 9.8 MG/DL (ref 8.5–10.1)
CHLORIDE SERPL-SCNC: 98 MEQ/L (ref 98–107)
CO2 SERPL-SCNC: 34 MEQ/L (ref 21–32)
CREAT SERPL-MCNC: 1.16 MG/DL (ref 0.55–1.3)
CREAT UR-MCNC: 29.1 MG/DL
EOSINOPHIL # BLD AUTO: 0.2 10^3/UL (ref 0–0.5)
EOSINOPHIL NFR BLD AUTO: 2.4 % (ref 0–3)
GFR SERPL CREATININE-BSD FRML MDRD: 58.8 ML/MIN/{1.73_M2} (ref 60–?)
GLUCOSE SERPL-MCNC: 197 MG/DL (ref 70–100)
GLUCOSE UR QL STRIP.AUTO: (no result) MG/DL
HCT VFR BLD AUTO: 32.4 % (ref 36–47)
HGB BLD-MCNC: 10.6 G/DL (ref 12–15.5)
HGB UR QL STRIP.AUTO: NEGATIVE
KETONES UR QL STRIP.AUTO: NEGATIVE MG/DL
LEUKOCYTE ESTERASE UR QL STRIP.AUTO: NEGATIVE
LYMPHOCYTES # BLD AUTO: 1.9 10^3/UL (ref 1.5–5)
LYMPHOCYTES NFR BLD AUTO: 30.8 % (ref 24–44)
MAGNESIUM SERPL-MCNC: 1.9 MG/DL (ref 1.8–2.4)
MCH RBC QN AUTO: 34.5 PG (ref 27–33)
MCHC RBC AUTO-ENTMCNC: 32.7 G/DL (ref 32–36.5)
MCV RBC AUTO: 105.5 FL (ref 80–96)
MONOCYTES # BLD AUTO: 0.5 10^3/UL (ref 0–0.8)
MONOCYTES NFR BLD AUTO: 7.3 % (ref 0–5)
NEUTROPHILS # BLD AUTO: 3.6 10^3/UL (ref 1.5–8.5)
NEUTROPHILS NFR BLD AUTO: 58.2 % (ref 36–66)
NITRITE UR QL STRIP.AUTO: NEGATIVE
PH UR STRIP.AUTO: 7 UNITS (ref 5–9)
PHOSPHATE SERPL-MCNC: 3.1 MG/DL (ref 2.5–4.9)
PLATELET # BLD AUTO: 449 10^3/UL (ref 150–450)
POTASSIUM SERPL-SCNC: 4.4 MEQ/L (ref 3.5–5.1)
PROT UR QL STRIP.AUTO: NEGATIVE MG/DL
PROT UR-MCNC: 17.2 MG/DL (ref 0–12)
RBC # BLD AUTO: 3.07 10^6/UL (ref 4–5.4)
RBC # UR AUTO: 2 /HPF (ref 0–3)
SODIUM SERPL-SCNC: 134 MEQ/L (ref 136–145)
SP GR UR STRIP.AUTO: 1.01 (ref 1–1.03)
SQUAMOUS #/AREA URNS AUTO: 1 /HPF (ref 0–6)
UROBILINOGEN UR QL STRIP.AUTO: 0.2 MG/DL (ref 0–2)
WBC # BLD AUTO: 6.3 10^3/UL (ref 4–10)
WBC #/AREA URNS AUTO: 1 /HPF (ref 0–3)

## 2020-10-05 ENCOUNTER — HOSPITAL ENCOUNTER (OUTPATIENT)
Dept: HOSPITAL 53 - M LAB | Age: 30
End: 2020-10-05
Attending: TRANSPLANT SURGERY
Payer: MEDICARE

## 2020-10-05 DIAGNOSIS — Z79.899: ICD-10-CM

## 2020-10-05 DIAGNOSIS — N18.5: Primary | ICD-10-CM

## 2020-10-05 DIAGNOSIS — D84.9: ICD-10-CM

## 2020-10-05 DIAGNOSIS — Z94.0: ICD-10-CM

## 2020-10-05 LAB
ALBUMIN SERPL BCG-MCNC: 3.4 GM/DL (ref 3.2–5.2)
APPEARANCE UR: (no result)
BACTERIA UR QL AUTO: (no result)
BASOPHILS # BLD AUTO: 0.1 10^3/UL (ref 0–0.2)
BASOPHILS NFR BLD AUTO: 0.9 % (ref 0–1)
BILIRUB UR QL STRIP.AUTO: NEGATIVE
BUN SERPL-MCNC: 17 MG/DL (ref 7–18)
CALCIUM SERPL-MCNC: 9.1 MG/DL (ref 8.5–10.1)
CAOX CRY URNS QL MICRO: (no result)
CHLORIDE SERPL-SCNC: 103 MEQ/L (ref 98–107)
CO2 SERPL-SCNC: 35 MEQ/L (ref 21–32)
CREAT SERPL-MCNC: 1.53 MG/DL (ref 0.55–1.3)
CREAT UR-MCNC: 95.9 MG/DL
EOSINOPHIL # BLD AUTO: 0.2 10^3/UL (ref 0–0.5)
EOSINOPHIL NFR BLD AUTO: 3.9 % (ref 0–3)
GFR SERPL CREATININE-BSD FRML MDRD: 42.7 ML/MIN/{1.73_M2} (ref 60–?)
GLUCOSE SERPL-MCNC: 179 MG/DL (ref 70–100)
GLUCOSE UR QL STRIP.AUTO: (no result) MG/DL
HCT VFR BLD AUTO: 31.9 % (ref 36–47)
HGB BLD-MCNC: 10.3 G/DL (ref 12–15.5)
HGB UR QL STRIP.AUTO: NEGATIVE
KETONES UR QL STRIP.AUTO: NEGATIVE MG/DL
LEUKOCYTE ESTERASE UR QL STRIP.AUTO: (no result)
LYMPHOCYTES # BLD AUTO: 2.3 10^3/UL (ref 1.5–5)
LYMPHOCYTES NFR BLD AUTO: 40 % (ref 24–44)
MAGNESIUM SERPL-MCNC: 1.8 MG/DL (ref 1.8–2.4)
MCH RBC QN AUTO: 34.4 PG (ref 27–33)
MCHC RBC AUTO-ENTMCNC: 32.3 G/DL (ref 32–36.5)
MCV RBC AUTO: 106.7 FL (ref 80–96)
MONOCYTES # BLD AUTO: 0.5 10^3/UL (ref 0–0.8)
MONOCYTES NFR BLD AUTO: 7.9 % (ref 0–5)
NEUTROPHILS # BLD AUTO: 2.7 10^3/UL (ref 1.5–8.5)
NEUTROPHILS NFR BLD AUTO: 47 % (ref 36–66)
NITRITE UR QL STRIP.AUTO: NEGATIVE
PH UR STRIP.AUTO: 6 UNITS (ref 5–9)
PHOSPHATE SERPL-MCNC: 3.8 MG/DL (ref 2.5–4.9)
PLATELET # BLD AUTO: 392 10^3/UL (ref 150–450)
POTASSIUM SERPL-SCNC: 4.3 MEQ/L (ref 3.5–5.1)
PROT UR QL STRIP.AUTO: NEGATIVE MG/DL
PROT UR-MCNC: 34.4 MG/DL (ref 0–12)
RBC # BLD AUTO: 2.99 10^6/UL (ref 4–5.4)
RBC # UR AUTO: 28 /HPF (ref 0–3)
SODIUM SERPL-SCNC: 140 MEQ/L (ref 136–145)
SP GR UR STRIP.AUTO: 1.02 (ref 1–1.03)
SQUAMOUS #/AREA URNS AUTO: 12 /HPF (ref 0–6)
UROBILINOGEN UR QL STRIP.AUTO: 0.2 MG/DL (ref 0–2)
WBC # BLD AUTO: 5.8 10^3/UL (ref 4–10)
WBC #/AREA URNS AUTO: 10 /HPF (ref 0–3)

## 2020-10-15 ENCOUNTER — HOSPITAL ENCOUNTER (OUTPATIENT)
Dept: HOSPITAL 53 - M LAB | Age: 30
End: 2020-10-15
Payer: MEDICARE

## 2020-10-15 DIAGNOSIS — D84.9: ICD-10-CM

## 2020-10-15 DIAGNOSIS — N18.5: ICD-10-CM

## 2020-10-15 DIAGNOSIS — Z79.899: ICD-10-CM

## 2020-10-15 DIAGNOSIS — Z51.81: Primary | ICD-10-CM

## 2020-10-15 DIAGNOSIS — Z94.0: ICD-10-CM

## 2020-10-15 LAB
ALBUMIN SERPL BCG-MCNC: 3.6 GM/DL (ref 3.2–5.2)
APPEARANCE UR: (no result)
BACTERIA UR QL AUTO: NEGATIVE
BASOPHILS # BLD AUTO: 0 10^3/UL (ref 0–0.2)
BASOPHILS NFR BLD AUTO: 0.7 % (ref 0–1)
BILIRUB UR QL STRIP.AUTO: NEGATIVE
BUN SERPL-MCNC: 19 MG/DL (ref 7–18)
CALCIUM SERPL-MCNC: 9.3 MG/DL (ref 8.5–10.1)
CHLORIDE SERPL-SCNC: 99 MEQ/L (ref 98–107)
CO2 SERPL-SCNC: 32 MEQ/L (ref 21–32)
CREAT SERPL-MCNC: 1.35 MG/DL (ref 0.55–1.3)
CREAT UR-MCNC: 50.8 MG/DL
EOSINOPHIL # BLD AUTO: 0.2 10^3/UL (ref 0–0.5)
EOSINOPHIL NFR BLD AUTO: 3.3 % (ref 0–3)
GFR SERPL CREATININE-BSD FRML MDRD: 49.4 ML/MIN/{1.73_M2} (ref 60–?)
GLUCOSE SERPL-MCNC: 274 MG/DL (ref 70–100)
GLUCOSE UR QL STRIP.AUTO: (no result) MG/DL
HCT VFR BLD AUTO: 31.9 % (ref 36–47)
HGB BLD-MCNC: 10.4 G/DL (ref 12–15.5)
HGB UR QL STRIP.AUTO: NEGATIVE
KETONES UR QL STRIP.AUTO: NEGATIVE MG/DL
LEUKOCYTE ESTERASE UR QL STRIP.AUTO: NEGATIVE
LYMPHOCYTES # BLD AUTO: 1.4 10^3/UL (ref 1.5–5)
LYMPHOCYTES NFR BLD AUTO: 24.8 % (ref 24–44)
MAGNESIUM SERPL-MCNC: 1.8 MG/DL (ref 1.8–2.4)
MCH RBC QN AUTO: 34.9 PG (ref 27–33)
MCHC RBC AUTO-ENTMCNC: 32.6 G/DL (ref 32–36.5)
MCV RBC AUTO: 107 FL (ref 80–96)
MONOCYTES # BLD AUTO: 0.6 10^3/UL (ref 0–0.8)
MONOCYTES NFR BLD AUTO: 9.6 % (ref 0–5)
NEUTROPHILS # BLD AUTO: 3.6 10^3/UL (ref 1.5–8.5)
NEUTROPHILS NFR BLD AUTO: 61.3 % (ref 36–66)
NITRITE UR QL STRIP.AUTO: NEGATIVE
PH UR STRIP.AUTO: 7 UNITS (ref 5–9)
PHOSPHATE SERPL-MCNC: 2.9 MG/DL (ref 2.5–4.9)
PLATELET # BLD AUTO: 372 10^3/UL (ref 150–450)
POTASSIUM SERPL-SCNC: 4.4 MEQ/L (ref 3.5–5.1)
PROT UR QL STRIP.AUTO: NEGATIVE MG/DL
PROT UR-MCNC: 17.6 MG/DL (ref 0–12)
RBC # BLD AUTO: 2.98 10^6/UL (ref 4–5.4)
RBC # UR AUTO: 28 /HPF (ref 0–3)
SODIUM SERPL-SCNC: 137 MEQ/L (ref 136–145)
SP GR UR STRIP.AUTO: 1.01 (ref 1–1.03)
SQUAMOUS #/AREA URNS AUTO: 6 /HPF (ref 0–6)
UROBILINOGEN UR QL STRIP.AUTO: 0.2 MG/DL (ref 0–2)
WBC # BLD AUTO: 5.8 10^3/UL (ref 4–10)
WBC #/AREA URNS AUTO: 5 /HPF (ref 0–3)

## 2020-10-22 ENCOUNTER — HOSPITAL ENCOUNTER (OUTPATIENT)
Dept: HOSPITAL 53 - M LAB | Age: 30
End: 2020-10-22
Payer: MEDICARE

## 2020-10-22 DIAGNOSIS — Z79.899: ICD-10-CM

## 2020-10-22 DIAGNOSIS — Z94.0: ICD-10-CM

## 2020-10-22 DIAGNOSIS — D84.9: Primary | ICD-10-CM

## 2020-10-22 LAB
ALBUMIN SERPL BCG-MCNC: 3.7 GM/DL (ref 3.2–5.2)
APPEARANCE UR: (no result)
BACTERIA UR QL AUTO: (no result)
BASOPHILS # BLD AUTO: 0.1 10^3/UL (ref 0–0.2)
BASOPHILS NFR BLD AUTO: 1 % (ref 0–1)
BILIRUB UR QL STRIP.AUTO: NEGATIVE
BUN SERPL-MCNC: 18 MG/DL (ref 7–18)
CALCIUM SERPL-MCNC: 9.1 MG/DL (ref 8.5–10.1)
CAOX CRY URNS QL MICRO: (no result)
CHLORIDE SERPL-SCNC: 99 MEQ/L (ref 98–107)
CO2 SERPL-SCNC: 31 MEQ/L (ref 21–32)
CREAT SERPL-MCNC: 1.53 MG/DL (ref 0.55–1.3)
CREAT UR-MCNC: 77.7 MG/DL
EOSINOPHIL # BLD AUTO: 0.2 10^3/UL (ref 0–0.5)
EOSINOPHIL NFR BLD AUTO: 2.6 % (ref 0–3)
GFR SERPL CREATININE-BSD FRML MDRD: 42.7 ML/MIN/{1.73_M2} (ref 60–?)
GLUCOSE SERPL-MCNC: 224 MG/DL (ref 70–100)
GLUCOSE UR QL STRIP.AUTO: (no result) MG/DL
HCT VFR BLD AUTO: 34.2 % (ref 36–47)
HGB BLD-MCNC: 11.1 G/DL (ref 12–15.5)
HGB UR QL STRIP.AUTO: NEGATIVE
KETONES UR QL STRIP.AUTO: (no result) MG/DL
LEUKOCYTE ESTERASE UR QL STRIP.AUTO: NEGATIVE
LYMPHOCYTES # BLD AUTO: 2.5 10^3/UL (ref 1.5–5)
LYMPHOCYTES NFR BLD AUTO: 40.3 % (ref 24–44)
MAGNESIUM SERPL-MCNC: 1.7 MG/DL (ref 1.8–2.4)
MCH RBC QN AUTO: 34.2 PG (ref 27–33)
MCHC RBC AUTO-ENTMCNC: 32.5 G/DL (ref 32–36.5)
MCV RBC AUTO: 105.2 FL (ref 80–96)
MONOCYTES # BLD AUTO: 0.6 10^3/UL (ref 0–0.8)
MONOCYTES NFR BLD AUTO: 9.2 % (ref 0–5)
NEUTROPHILS # BLD AUTO: 2.9 10^3/UL (ref 1.5–8.5)
NEUTROPHILS NFR BLD AUTO: 46.7 % (ref 36–66)
NITRITE UR QL STRIP.AUTO: NEGATIVE
PH UR STRIP.AUTO: 6 UNITS (ref 5–9)
PHOSPHATE SERPL-MCNC: 3.5 MG/DL (ref 2.5–4.9)
PLATELET # BLD AUTO: 398 10^3/UL (ref 150–450)
POTASSIUM SERPL-SCNC: 3.8 MEQ/L (ref 3.5–5.1)
PROT UR QL STRIP.AUTO: NEGATIVE MG/DL
PROT UR-MCNC: 23.7 MG/DL (ref 0–12)
RBC # BLD AUTO: 3.25 10^6/UL (ref 4–5.4)
RBC # UR AUTO: 1 /HPF (ref 0–3)
SODIUM SERPL-SCNC: 135 MEQ/L (ref 136–145)
SP GR UR STRIP.AUTO: 1.01 (ref 1–1.03)
SQUAMOUS #/AREA URNS AUTO: 1 /HPF (ref 0–6)
UROBILINOGEN UR QL STRIP.AUTO: 0.2 MG/DL (ref 0–2)
WBC # BLD AUTO: 6.1 10^3/UL (ref 4–10)
WBC #/AREA URNS AUTO: 8 /HPF (ref 0–3)

## 2020-11-09 ENCOUNTER — HOSPITAL ENCOUNTER (OUTPATIENT)
Dept: HOSPITAL 53 - M LAB | Age: 30
End: 2020-11-09
Payer: MEDICARE

## 2020-11-09 DIAGNOSIS — Z94.0: Primary | ICD-10-CM

## 2020-11-09 DIAGNOSIS — Z79.899: ICD-10-CM

## 2020-11-09 DIAGNOSIS — D64.9: ICD-10-CM

## 2020-11-09 DIAGNOSIS — N18.5: ICD-10-CM

## 2020-11-09 LAB
ALBUMIN SERPL BCG-MCNC: 3.6 GM/DL (ref 3.2–5.2)
APPEARANCE UR: CLEAR
BACTERIA UR QL AUTO: NEGATIVE
BASOPHILS # BLD AUTO: 0.1 10^3/UL (ref 0–0.2)
BASOPHILS NFR BLD AUTO: 1 % (ref 0–1)
BILIRUB UR QL STRIP.AUTO: NEGATIVE
BUN SERPL-MCNC: 13 MG/DL (ref 7–18)
CALCIUM SERPL-MCNC: 9.5 MG/DL (ref 8.5–10.1)
CHLORIDE SERPL-SCNC: 97 MEQ/L (ref 98–107)
CO2 SERPL-SCNC: 35 MEQ/L (ref 21–32)
CREAT SERPL-MCNC: 0.84 MG/DL (ref 0.55–1.3)
CREAT UR-MCNC: 18.3 MG/DL
EOSINOPHIL # BLD AUTO: 0.2 10^3/UL (ref 0–0.5)
EOSINOPHIL NFR BLD AUTO: 4.4 % (ref 0–3)
GFR SERPL CREATININE-BSD FRML MDRD: > 60 ML/MIN/{1.73_M2} (ref 60–?)
GLUCOSE SERPL-MCNC: 189 MG/DL (ref 70–100)
GLUCOSE UR QL STRIP.AUTO: (no result) MG/DL
HCT VFR BLD AUTO: 31.8 % (ref 36–47)
HGB BLD-MCNC: 10.8 G/DL (ref 12–15.5)
HGB UR QL STRIP.AUTO: NEGATIVE
KETONES UR QL STRIP.AUTO: NEGATIVE MG/DL
LEUKOCYTE ESTERASE UR QL STRIP.AUTO: (no result)
LYMPHOCYTES # BLD AUTO: 1.6 10^3/UL (ref 1.5–5)
LYMPHOCYTES NFR BLD AUTO: 34.1 % (ref 24–44)
MAGNESIUM SERPL-MCNC: 1.8 MG/DL (ref 1.8–2.4)
MCH RBC QN AUTO: 35.1 PG (ref 27–33)
MCHC RBC AUTO-ENTMCNC: 34 G/DL (ref 32–36.5)
MCV RBC AUTO: 103.2 FL (ref 80–96)
MONOCYTES # BLD AUTO: 0.5 10^3/UL (ref 0–0.8)
MONOCYTES NFR BLD AUTO: 10 % (ref 0–5)
NEUTROPHILS # BLD AUTO: 2.4 10^3/UL (ref 1.5–8.5)
NEUTROPHILS NFR BLD AUTO: 50.1 % (ref 36–66)
NITRITE UR QL STRIP.AUTO: NEGATIVE
PH UR STRIP.AUTO: 7 UNITS (ref 5–9)
PHOSPHATE SERPL-MCNC: 3.2 MG/DL (ref 2.5–4.9)
PLATELET # BLD AUTO: 403 10^3/UL (ref 150–450)
POTASSIUM SERPL-SCNC: 4.4 MEQ/L (ref 3.5–5.1)
PROT UR QL STRIP.AUTO: NEGATIVE MG/DL
PROT UR-MCNC: 13.9 MG/DL (ref 0–12)
RBC # BLD AUTO: 3.08 10^6/UL (ref 4–5.4)
RBC # UR AUTO: 1 /HPF (ref 0–3)
SODIUM SERPL-SCNC: 134 MEQ/L (ref 136–145)
SP GR UR STRIP.AUTO: 1 (ref 1–1.03)
SQUAMOUS #/AREA URNS AUTO: 0 /HPF (ref 0–6)
UROBILINOGEN UR QL STRIP.AUTO: 0.2 MG/DL (ref 0–2)
WBC # BLD AUTO: 4.8 10^3/UL (ref 4–10)
WBC #/AREA URNS AUTO: 4 /HPF (ref 0–3)

## 2020-12-03 ENCOUNTER — HOSPITAL ENCOUNTER (OUTPATIENT)
Dept: HOSPITAL 53 - M LAB | Age: 30
End: 2020-12-03
Payer: MEDICARE

## 2020-12-03 DIAGNOSIS — N18.5: ICD-10-CM

## 2020-12-03 DIAGNOSIS — D84.9: Primary | ICD-10-CM

## 2020-12-03 DIAGNOSIS — Z94.0: ICD-10-CM

## 2020-12-03 DIAGNOSIS — Z79.899: ICD-10-CM

## 2020-12-03 LAB
ALBUMIN SERPL BCG-MCNC: 3.8 GM/DL (ref 3.2–5.2)
APPEARANCE UR: CLEAR
BACTERIA UR QL AUTO: NEGATIVE
BASOPHILS # BLD AUTO: 0 10^3/UL (ref 0–0.2)
BASOPHILS NFR BLD AUTO: 0.8 % (ref 0–1)
BILIRUB UR QL STRIP.AUTO: NEGATIVE
BUN SERPL-MCNC: 12 MG/DL (ref 7–18)
CALCIUM SERPL-MCNC: 9.6 MG/DL (ref 8.5–10.1)
CHLORIDE SERPL-SCNC: 100 MEQ/L (ref 98–107)
CO2 SERPL-SCNC: 32 MEQ/L (ref 21–32)
CREAT SERPL-MCNC: 0.88 MG/DL (ref 0.55–1.3)
EOSINOPHIL # BLD AUTO: 0.3 10^3/UL (ref 0–0.5)
EOSINOPHIL NFR BLD AUTO: 5.5 % (ref 0–3)
GFR SERPL CREATININE-BSD FRML MDRD: > 60 ML/MIN/{1.73_M2} (ref 60–?)
GLUCOSE SERPL-MCNC: 73 MG/DL (ref 70–100)
GLUCOSE UR QL STRIP.AUTO: (no result) MG/DL
HCT VFR BLD AUTO: 36.2 % (ref 36–47)
HGB BLD-MCNC: 11.7 G/DL (ref 12–15.5)
HGB UR QL STRIP.AUTO: NEGATIVE
KETONES UR QL STRIP.AUTO: NEGATIVE MG/DL
LEUKOCYTE ESTERASE UR QL STRIP.AUTO: NEGATIVE
LYMPHOCYTES # BLD AUTO: 1.7 10^3/UL (ref 1.5–5)
LYMPHOCYTES NFR BLD AUTO: 31.7 % (ref 24–44)
MAGNESIUM SERPL-MCNC: 1.8 MG/DL (ref 1.8–2.4)
MCH RBC QN AUTO: 33.5 PG (ref 27–33)
MCHC RBC AUTO-ENTMCNC: 32.3 G/DL (ref 32–36.5)
MCV RBC AUTO: 103.7 FL (ref 80–96)
MONOCYTES # BLD AUTO: 0.6 10^3/UL (ref 0–0.8)
MONOCYTES NFR BLD AUTO: 10.8 % (ref 0–5)
NEUTROPHILS # BLD AUTO: 2.7 10^3/UL (ref 1.5–8.5)
NEUTROPHILS NFR BLD AUTO: 50.6 % (ref 36–66)
NITRITE UR QL STRIP.AUTO: NEGATIVE
PH UR STRIP.AUTO: 8 UNITS (ref 5–9)
PHOSPHATE SERPL-MCNC: 3.5 MG/DL (ref 2.5–4.9)
PLATELET # BLD AUTO: 401 10^3/UL (ref 150–450)
POTASSIUM SERPL-SCNC: 4.4 MEQ/L (ref 3.5–5.1)
PROT UR QL STRIP.AUTO: NEGATIVE MG/DL
PROT UR-MCNC: 11.7 MG/DL (ref 0–12)
RBC # BLD AUTO: 3.49 10^6/UL (ref 4–5.4)
RBC # UR AUTO: 0 /HPF (ref 0–3)
SODIUM SERPL-SCNC: 138 MEQ/L (ref 136–145)
SP GR UR STRIP.AUTO: 1 (ref 1–1.03)
SQUAMOUS #/AREA URNS AUTO: 0 /HPF (ref 0–6)
UROBILINOGEN UR QL STRIP.AUTO: 0.2 MG/DL (ref 0–2)
WBC # BLD AUTO: 5.3 10^3/UL (ref 4–10)
WBC #/AREA URNS AUTO: 0 /HPF (ref 0–3)

## 2020-12-14 ENCOUNTER — HOSPITAL ENCOUNTER (OUTPATIENT)
Dept: HOSPITAL 53 - M LAB | Age: 30
End: 2020-12-14
Payer: MEDICARE

## 2020-12-14 DIAGNOSIS — Z94.0: ICD-10-CM

## 2020-12-14 DIAGNOSIS — D84.9: ICD-10-CM

## 2020-12-14 DIAGNOSIS — Z79.899: ICD-10-CM

## 2020-12-14 DIAGNOSIS — N18.5: Primary | ICD-10-CM

## 2020-12-14 LAB
ALBUMIN SERPL BCG-MCNC: 3.6 GM/DL (ref 3.2–5.2)
APPEARANCE UR: CLEAR
BACTERIA UR QL AUTO: (no result)
BASOPHILS # BLD AUTO: 0.1 10^3/UL (ref 0–0.2)
BASOPHILS NFR BLD AUTO: 1 % (ref 0–1)
BILIRUB UR QL STRIP.AUTO: NEGATIVE
BUN SERPL-MCNC: 13 MG/DL (ref 7–18)
CALCIUM SERPL-MCNC: 9 MG/DL (ref 8.5–10.1)
CHLORIDE SERPL-SCNC: 98 MEQ/L (ref 98–107)
CO2 SERPL-SCNC: 34 MEQ/L (ref 21–32)
CREAT SERPL-MCNC: 1.12 MG/DL (ref 0.55–1.3)
CREAT UR-MCNC: 35.3 MG/DL
EOSINOPHIL # BLD AUTO: 0.3 10^3/UL (ref 0–0.5)
EOSINOPHIL NFR BLD AUTO: 5.7 % (ref 0–3)
GFR SERPL CREATININE-BSD FRML MDRD: > 60 ML/MIN/{1.73_M2} (ref 60–?)
GLUCOSE SERPL-MCNC: 199 MG/DL (ref 70–100)
GLUCOSE UR QL STRIP.AUTO: (no result) MG/DL
HCT VFR BLD AUTO: 34.7 % (ref 36–47)
HGB BLD-MCNC: 11.1 G/DL (ref 12–15.5)
HGB UR QL STRIP.AUTO: NEGATIVE
KETONES UR QL STRIP.AUTO: NEGATIVE MG/DL
LEUKOCYTE ESTERASE UR QL STRIP.AUTO: NEGATIVE
LYMPHOCYTES # BLD AUTO: 1.8 10^3/UL (ref 1.5–5)
LYMPHOCYTES NFR BLD AUTO: 34.6 % (ref 24–44)
MAGNESIUM SERPL-MCNC: 1.8 MG/DL (ref 1.8–2.4)
MCH RBC QN AUTO: 32.3 PG (ref 27–33)
MCHC RBC AUTO-ENTMCNC: 32 G/DL (ref 32–36.5)
MCV RBC AUTO: 100.9 FL (ref 80–96)
MONOCYTES # BLD AUTO: 0.6 10^3/UL (ref 0–0.8)
MONOCYTES NFR BLD AUTO: 11.2 % (ref 0–5)
NEUTROPHILS # BLD AUTO: 2.5 10^3/UL (ref 1.5–8.5)
NEUTROPHILS NFR BLD AUTO: 47.3 % (ref 36–66)
NITRITE UR QL STRIP.AUTO: NEGATIVE
PH UR STRIP.AUTO: 7 UNITS (ref 5–9)
PHOSPHATE SERPL-MCNC: 4.2 MG/DL (ref 2.5–4.9)
PLATELET # BLD AUTO: 364 10^3/UL (ref 150–450)
POTASSIUM SERPL-SCNC: 4.4 MEQ/L (ref 3.5–5.1)
PROT UR QL STRIP.AUTO: NEGATIVE MG/DL
PROT UR-MCNC: 9.9 MG/DL (ref 0–12)
RBC # BLD AUTO: 3.44 10^6/UL (ref 4–5.4)
RBC # UR AUTO: 0 /HPF (ref 0–3)
SODIUM SERPL-SCNC: 138 MEQ/L (ref 136–145)
SP GR UR STRIP.AUTO: 1.01 (ref 1–1.03)
SQUAMOUS #/AREA URNS AUTO: 0 /HPF (ref 0–6)
UROBILINOGEN UR QL STRIP.AUTO: 0.2 MG/DL (ref 0–2)
WBC # BLD AUTO: 5.3 10^3/UL (ref 4–10)
WBC #/AREA URNS AUTO: 3 /HPF (ref 0–3)

## 2021-01-18 ENCOUNTER — HOSPITAL ENCOUNTER (OUTPATIENT)
Dept: HOSPITAL 53 - M LAB | Age: 31
End: 2021-01-18
Payer: MEDICARE

## 2021-01-18 ENCOUNTER — HOSPITAL ENCOUNTER (OUTPATIENT)
Dept: HOSPITAL 53 - M LAB | Age: 31
End: 2021-01-18
Attending: TRANSPLANT SURGERY
Payer: MEDICARE

## 2021-01-18 DIAGNOSIS — Z79.899: ICD-10-CM

## 2021-01-18 DIAGNOSIS — N18.5: ICD-10-CM

## 2021-01-18 DIAGNOSIS — Z01.818: Primary | ICD-10-CM

## 2021-01-18 DIAGNOSIS — Z94.0: Primary | ICD-10-CM

## 2021-01-18 DIAGNOSIS — D84.9: ICD-10-CM

## 2021-01-18 LAB
ALBUMIN SERPL BCG-MCNC: 3.3 GM/DL (ref 3.2–5.2)
APPEARANCE UR: (no result)
BACTERIA UR QL AUTO: (no result)
BASOPHILS # BLD AUTO: 0.1 10^3/UL (ref 0–0.2)
BASOPHILS NFR BLD AUTO: 1 % (ref 0–1)
BILIRUB UR QL STRIP.AUTO: NEGATIVE
BUN SERPL-MCNC: 16 MG/DL (ref 7–18)
CALCIUM SERPL-MCNC: 9.7 MG/DL (ref 8.5–10.1)
CHLORIDE SERPL-SCNC: 98 MEQ/L (ref 98–107)
CO2 SERPL-SCNC: 33 MEQ/L (ref 21–32)
CREAT SERPL-MCNC: 1.09 MG/DL (ref 0.55–1.3)
CREAT UR-MCNC: 124 MG/DL
EOSINOPHIL # BLD AUTO: 0.3 10^3/UL (ref 0–0.5)
EOSINOPHIL NFR BLD AUTO: 5.8 % (ref 0–3)
GFR SERPL CREATININE-BSD FRML MDRD: > 60 ML/MIN/{1.73_M2} (ref 60–?)
GLUCOSE SERPL-MCNC: 223 MG/DL (ref 70–100)
GLUCOSE UR QL STRIP.AUTO: (no result) MG/DL
HCT VFR BLD AUTO: 35.3 % (ref 36–47)
HGB BLD-MCNC: 11.1 G/DL (ref 12–15.5)
HGB UR QL STRIP.AUTO: NEGATIVE
KETONES UR QL STRIP.AUTO: (no result) MG/DL
LEUKOCYTE ESTERASE UR QL STRIP.AUTO: (no result)
LYMPHOCYTES # BLD AUTO: 1.6 10^3/UL (ref 1.5–5)
LYMPHOCYTES NFR BLD AUTO: 27.7 % (ref 24–44)
MAGNESIUM SERPL-MCNC: 1.9 MG/DL (ref 1.8–2.4)
MCH RBC QN AUTO: 31 PG (ref 27–33)
MCHC RBC AUTO-ENTMCNC: 31.4 G/DL (ref 32–36.5)
MCV RBC AUTO: 98.6 FL (ref 80–96)
MONOCYTES # BLD AUTO: 0.5 10^3/UL (ref 0–0.8)
MONOCYTES NFR BLD AUTO: 8.7 % (ref 0–5)
NEUTROPHILS # BLD AUTO: 3.3 10^3/UL (ref 1.5–8.5)
NEUTROPHILS NFR BLD AUTO: 56.6 % (ref 36–66)
NITRITE UR QL STRIP.AUTO: POSITIVE
PH UR STRIP.AUTO: 6 UNITS (ref 5–9)
PHOSPHATE SERPL-MCNC: 3.5 MG/DL (ref 2.5–4.9)
PLATELET # BLD AUTO: 494 10^3/UL (ref 150–450)
POTASSIUM SERPL-SCNC: 4.3 MEQ/L (ref 3.5–5.1)
PROT UR QL STRIP.AUTO: NEGATIVE MG/DL
PROT UR-MCNC: 27.8 MG/DL (ref 0–12)
RBC # BLD AUTO: 3.58 10^6/UL (ref 4–5.4)
RBC # UR AUTO: 2 /HPF (ref 0–3)
SODIUM SERPL-SCNC: 135 MEQ/L (ref 136–145)
SP GR UR STRIP.AUTO: 1.02 (ref 1–1.03)
SQUAMOUS #/AREA URNS AUTO: 4 /HPF (ref 0–6)
UROBILINOGEN UR QL STRIP.AUTO: 0.2 MG/DL (ref 0–2)
WBC # BLD AUTO: 5.9 10^3/UL (ref 4–10)
WBC #/AREA URNS AUTO: 50 /HPF (ref 0–3)

## 2021-02-27 ENCOUNTER — HOSPITAL ENCOUNTER (OUTPATIENT)
Dept: HOSPITAL 53 - M LAB | Age: 31
End: 2021-02-27
Attending: TRANSPLANT SURGERY
Payer: MEDICARE

## 2021-02-27 ENCOUNTER — HOSPITAL ENCOUNTER (OUTPATIENT)
Dept: HOSPITAL 53 - M LAB | Age: 31
End: 2021-02-27
Payer: MEDICARE

## 2021-02-27 DIAGNOSIS — Z01.818: Primary | ICD-10-CM

## 2021-02-27 DIAGNOSIS — D84.9: ICD-10-CM

## 2021-02-27 DIAGNOSIS — Z79.899: ICD-10-CM

## 2021-02-27 DIAGNOSIS — N18.5: Primary | ICD-10-CM

## 2021-02-27 DIAGNOSIS — Z94.0: ICD-10-CM

## 2021-02-27 LAB
ALBUMIN SERPL BCG-MCNC: 3.9 GM/DL (ref 3.2–5.2)
AMORPH SED URNS QL MICRO: (no result)
APPEARANCE UR: (no result)
BACTERIA UR QL AUTO: NEGATIVE
BASOPHILS # BLD AUTO: 0.1 10^3/UL (ref 0–0.2)
BASOPHILS NFR BLD AUTO: 0.9 % (ref 0–1)
BILIRUB UR QL STRIP.AUTO: NEGATIVE
BUN SERPL-MCNC: 15 MG/DL (ref 7–18)
CALCIUM SERPL-MCNC: 9.3 MG/DL (ref 8.5–10.1)
CHLORIDE SERPL-SCNC: 98 MEQ/L (ref 98–107)
CO2 SERPL-SCNC: 36 MEQ/L (ref 21–32)
CREAT SERPL-MCNC: 1.13 MG/DL (ref 0.55–1.3)
EOSINOPHIL # BLD AUTO: 0.3 10^3/UL (ref 0–0.5)
EOSINOPHIL NFR BLD AUTO: 5.8 % (ref 0–3)
GFR SERPL CREATININE-BSD FRML MDRD: > 60 ML/MIN/{1.73_M2} (ref 60–?)
GLUCOSE SERPL-MCNC: 187 MG/DL (ref 70–100)
GLUCOSE UR QL STRIP.AUTO: (no result) MG/DL
HCT VFR BLD AUTO: 37.5 % (ref 36–47)
HGB BLD-MCNC: 11.9 G/DL (ref 12–15.5)
HGB UR QL STRIP.AUTO: NEGATIVE
KETONES UR QL STRIP.AUTO: NEGATIVE MG/DL
LEUKOCYTE ESTERASE UR QL STRIP.AUTO: NEGATIVE
LYMPHOCYTES # BLD AUTO: 1.6 10^3/UL (ref 1.5–5)
LYMPHOCYTES NFR BLD AUTO: 29.6 % (ref 24–44)
MAGNESIUM SERPL-MCNC: 1.9 MG/DL (ref 1.8–2.4)
MCH RBC QN AUTO: 30.5 PG (ref 27–33)
MCHC RBC AUTO-ENTMCNC: 31.7 G/DL (ref 32–36.5)
MCV RBC AUTO: 96.2 FL (ref 80–96)
MONOCYTES # BLD AUTO: 0.5 10^3/UL (ref 0–0.8)
MONOCYTES NFR BLD AUTO: 9.9 % (ref 2–8)
MUCOUS THREADS URNS QL MICRO: (no result)
NEUTROPHILS # BLD AUTO: 2.9 10^3/UL (ref 1.5–8.5)
NEUTROPHILS NFR BLD AUTO: 53.6 % (ref 36–66)
NITRITE UR QL STRIP.AUTO: NEGATIVE
PH UR STRIP.AUTO: 7 UNITS (ref 5–9)
PHOSPHATE SERPL-MCNC: 4.1 MG/DL (ref 2.5–4.9)
PLATELET # BLD AUTO: 392 10^3/UL (ref 150–450)
POTASSIUM SERPL-SCNC: 4.8 MEQ/L (ref 3.5–5.1)
PROT UR QL STRIP.AUTO: NEGATIVE MG/DL
PROT UR-MCNC: 19.7 MG/DL (ref 0–12)
RBC # BLD AUTO: 3.9 10^6/UL (ref 4–5.4)
RBC # UR AUTO: 1 /HPF (ref 0–3)
SODIUM SERPL-SCNC: 139 MEQ/L (ref 136–145)
SP GR UR STRIP.AUTO: 1.02 (ref 1–1.03)
SQUAMOUS #/AREA URNS AUTO: 3 /HPF (ref 0–6)
UROBILINOGEN UR QL STRIP.AUTO: 0.2 MG/DL (ref 0–2)
WBC # BLD AUTO: 5.4 10^3/UL (ref 4–10)
WBC #/AREA URNS AUTO: 14 /HPF (ref 0–3)

## 2021-04-21 ENCOUNTER — HOSPITAL ENCOUNTER (OUTPATIENT)
Dept: HOSPITAL 53 - M LAB | Age: 31
End: 2021-04-21
Payer: MEDICARE

## 2021-04-21 ENCOUNTER — HOSPITAL ENCOUNTER (OUTPATIENT)
Dept: HOSPITAL 53 - M LAB | Age: 31
End: 2021-04-21
Attending: TRANSPLANT SURGERY
Payer: MEDICARE

## 2021-04-21 DIAGNOSIS — Z79.899: ICD-10-CM

## 2021-04-21 DIAGNOSIS — Z94.0: ICD-10-CM

## 2021-04-21 DIAGNOSIS — D84.9: Primary | ICD-10-CM

## 2021-04-21 LAB
ALBUMIN SERPL BCG-MCNC: 3.9 GM/DL (ref 3.2–5.2)
APPEARANCE UR: CLEAR
BACTERIA UR QL AUTO: NEGATIVE
BASOPHILS # BLD AUTO: 0.1 10^3/UL (ref 0–0.2)
BASOPHILS NFR BLD AUTO: 1 % (ref 0–1)
BILIRUB UR QL STRIP.AUTO: NEGATIVE
BUN SERPL-MCNC: 13 MG/DL (ref 7–18)
CALCIUM SERPL-MCNC: 9.4 MG/DL (ref 8.5–10.1)
CHLORIDE SERPL-SCNC: 96 MEQ/L (ref 98–107)
CO2 SERPL-SCNC: 34 MEQ/L (ref 21–32)
CREAT SERPL-MCNC: 1.07 MG/DL (ref 0.55–1.3)
CREAT UR-MCNC: 34.7 MG/DL
EOSINOPHIL # BLD AUTO: 0.4 10^3/UL (ref 0–0.5)
EOSINOPHIL NFR BLD AUTO: 5.8 % (ref 0–3)
GFR SERPL CREATININE-BSD FRML MDRD: > 60 ML/MIN/{1.73_M2} (ref 60–?)
GLUCOSE SERPL-MCNC: 107 MG/DL (ref 70–100)
GLUCOSE UR QL STRIP.AUTO: (no result) MG/DL
HCT VFR BLD AUTO: 34.8 % (ref 36–47)
HGB BLD-MCNC: 11.5 G/DL (ref 12–15.5)
HGB UR QL STRIP.AUTO: NEGATIVE
KETONES UR QL STRIP.AUTO: (no result) MG/DL
LEUKOCYTE ESTERASE UR QL STRIP.AUTO: NEGATIVE
LYMPHOCYTES # BLD AUTO: 3.1 10^3/UL (ref 1.5–5)
LYMPHOCYTES NFR BLD AUTO: 49.8 % (ref 24–44)
MAGNESIUM SERPL-MCNC: 2 MG/DL (ref 1.8–2.4)
MCH RBC QN AUTO: 31.5 PG (ref 27–33)
MCHC RBC AUTO-ENTMCNC: 33 G/DL (ref 32–36.5)
MCV RBC AUTO: 95.3 FL (ref 80–96)
MONOCYTES # BLD AUTO: 0.6 10^3/UL (ref 0–0.8)
MONOCYTES NFR BLD AUTO: 8.8 % (ref 2–8)
NEUTROPHILS # BLD AUTO: 2.2 10^3/UL (ref 1.5–8.5)
NEUTROPHILS NFR BLD AUTO: 34.4 % (ref 36–66)
NITRITE UR QL STRIP.AUTO: NEGATIVE
PH UR STRIP.AUTO: 6 UNITS (ref 5–9)
PHOSPHATE SERPL-MCNC: 4 MG/DL (ref 2.5–4.9)
PLATELET # BLD AUTO: 363 10^3/UL (ref 150–450)
POTASSIUM SERPL-SCNC: 4 MEQ/L (ref 3.5–5.1)
PROT UR QL STRIP.AUTO: NEGATIVE MG/DL
PROT UR-MCNC: 7.4 MG/DL (ref 0–12)
RBC # BLD AUTO: 3.65 10^6/UL (ref 4–5.4)
RBC # UR AUTO: 1 /HPF (ref 0–3)
SODIUM SERPL-SCNC: 133 MEQ/L (ref 136–145)
SP GR UR STRIP.AUTO: 1.01 (ref 1–1.03)
SQUAMOUS #/AREA URNS AUTO: 1 /HPF (ref 0–6)
UROBILINOGEN UR QL STRIP.AUTO: 0.2 MG/DL (ref 0–2)
WBC # BLD AUTO: 6.2 10^3/UL (ref 4–10)
WBC #/AREA URNS AUTO: 1 /HPF (ref 0–3)

## 2021-05-31 ENCOUNTER — HOSPITAL ENCOUNTER (OUTPATIENT)
Dept: HOSPITAL 53 - M LAB | Age: 31
End: 2021-05-31
Attending: TRANSPLANT SURGERY
Payer: COMMERCIAL

## 2021-05-31 ENCOUNTER — HOSPITAL ENCOUNTER (OUTPATIENT)
Dept: HOSPITAL 53 - M LAB | Age: 31
End: 2021-05-31
Payer: COMMERCIAL

## 2021-05-31 DIAGNOSIS — Z79.899: ICD-10-CM

## 2021-05-31 DIAGNOSIS — Z01.818: Primary | ICD-10-CM

## 2021-05-31 DIAGNOSIS — D84.9: ICD-10-CM

## 2021-05-31 DIAGNOSIS — N18.5: ICD-10-CM

## 2021-05-31 DIAGNOSIS — Z94.0: Primary | ICD-10-CM

## 2021-05-31 LAB
ALBUMIN SERPL BCG-MCNC: 3.9 GM/DL (ref 3.2–5.2)
APPEARANCE UR: CLEAR
BACTERIA UR QL AUTO: NEGATIVE
BASOPHILS # BLD AUTO: 0.1 10^3/UL (ref 0–0.2)
BASOPHILS NFR BLD AUTO: 1.2 % (ref 0–1)
BILIRUB UR QL STRIP.AUTO: NEGATIVE
BUN SERPL-MCNC: 16 MG/DL (ref 7–18)
CALCIUM SERPL-MCNC: 9.1 MG/DL (ref 8.5–10.1)
CHLORIDE SERPL-SCNC: 98 MEQ/L (ref 98–107)
CO2 SERPL-SCNC: 33 MEQ/L (ref 21–32)
CREAT SERPL-MCNC: 1.2 MG/DL (ref 0.55–1.3)
CREAT UR-MCNC: 52.5 MG/DL
EOSINOPHIL # BLD AUTO: 0.4 10^3/UL (ref 0–0.5)
EOSINOPHIL NFR BLD AUTO: 6.5 % (ref 0–3)
GFR SERPL CREATININE-BSD FRML MDRD: 56.2 ML/MIN/{1.73_M2} (ref 60–?)
GLUCOSE SERPL-MCNC: 211 MG/DL (ref 70–100)
GLUCOSE UR QL STRIP.AUTO: (no result) MG/DL
HCT VFR BLD AUTO: 37.8 % (ref 36–47)
HGB BLD-MCNC: 12 G/DL (ref 12–15.5)
HGB UR QL STRIP.AUTO: NEGATIVE
KETONES UR QL STRIP.AUTO: NEGATIVE MG/DL
LEUKOCYTE ESTERASE UR QL STRIP.AUTO: NEGATIVE
LYMPHOCYTES # BLD AUTO: 1.5 10^3/UL (ref 1.5–5)
LYMPHOCYTES NFR BLD AUTO: 26.2 % (ref 24–44)
MAGNESIUM SERPL-MCNC: 1.9 MG/DL (ref 1.8–2.4)
MCH RBC QN AUTO: 31.3 PG (ref 27–33)
MCHC RBC AUTO-ENTMCNC: 31.7 G/DL (ref 32–36.5)
MCV RBC AUTO: 98.7 FL (ref 80–96)
MONOCYTES # BLD AUTO: 0.5 10^3/UL (ref 0–0.8)
MONOCYTES NFR BLD AUTO: 8.6 % (ref 2–8)
NEUTROPHILS # BLD AUTO: 3.3 10^3/UL (ref 1.5–8.5)
NEUTROPHILS NFR BLD AUTO: 57.3 % (ref 36–66)
NITRITE UR QL STRIP.AUTO: NEGATIVE
PH UR STRIP.AUTO: 8 UNITS (ref 5–9)
PHOSPHATE SERPL-MCNC: 2.8 MG/DL (ref 2.5–4.9)
PLATELET # BLD AUTO: 378 10^3/UL (ref 150–450)
POTASSIUM SERPL-SCNC: 4.1 MEQ/L (ref 3.5–5.1)
PROT UR QL STRIP.AUTO: NEGATIVE MG/DL
PROT UR-MCNC: 15.9 MG/DL (ref 0–12)
RBC # BLD AUTO: 3.83 10^6/UL (ref 4–5.4)
RBC # UR AUTO: 1 /HPF (ref 0–3)
SODIUM SERPL-SCNC: 136 MEQ/L (ref 136–145)
SP GR UR STRIP.AUTO: 1.01 (ref 1–1.03)
SQUAMOUS #/AREA URNS AUTO: 1 /HPF (ref 0–6)
UROBILINOGEN UR QL STRIP.AUTO: 0.2 MG/DL (ref 0–2)
WBC # BLD AUTO: 5.7 10^3/UL (ref 4–10)
WBC #/AREA URNS AUTO: 0 /HPF (ref 0–3)

## 2021-07-14 ENCOUNTER — HOSPITAL ENCOUNTER (OUTPATIENT)
Dept: HOSPITAL 53 - M LAB | Age: 31
End: 2021-07-14
Attending: NURSE PRACTITIONER
Payer: MEDICARE

## 2021-07-14 ENCOUNTER — HOSPITAL ENCOUNTER (OUTPATIENT)
Dept: HOSPITAL 53 - M LAB | Age: 31
End: 2021-07-14
Payer: MEDICARE

## 2021-07-14 DIAGNOSIS — Z79.899: Primary | ICD-10-CM

## 2021-07-14 DIAGNOSIS — N18.5: ICD-10-CM

## 2021-07-14 DIAGNOSIS — Z94.0: ICD-10-CM

## 2021-07-14 DIAGNOSIS — Z20.2: ICD-10-CM

## 2021-07-14 DIAGNOSIS — Z01.818: Primary | ICD-10-CM

## 2021-07-14 DIAGNOSIS — E03.9: ICD-10-CM

## 2021-07-14 DIAGNOSIS — E55.9: ICD-10-CM

## 2021-07-14 DIAGNOSIS — D64.9: Primary | ICD-10-CM

## 2021-07-14 DIAGNOSIS — D84.9: ICD-10-CM

## 2021-07-14 LAB
25(OH)D3 SERPL-MCNC: 32.2 NG/ML (ref 30–100)
ALBUMIN SERPL BCG-MCNC: 4.1 GM/DL (ref 3.2–5.2)
APPEARANCE UR: (no result)
BACTERIA UR QL AUTO: NEGATIVE
BASOPHILS # BLD AUTO: 0.1 10^3/UL (ref 0–0.2)
BASOPHILS NFR BLD AUTO: 1.1 % (ref 0–1)
BILIRUB UR QL STRIP.AUTO: NEGATIVE
BUN SERPL-MCNC: 16 MG/DL (ref 7–18)
CALCIUM SERPL-MCNC: 9.3 MG/DL (ref 8.5–10.1)
CHLORIDE SERPL-SCNC: 95 MEQ/L (ref 98–107)
CO2 SERPL-SCNC: 35 MEQ/L (ref 21–32)
CREAT SERPL-MCNC: 1.15 MG/DL (ref 0.55–1.3)
CREAT UR-MCNC: 76 MG/DL
EOSINOPHIL # BLD AUTO: 0.4 10^3/UL (ref 0–0.5)
EOSINOPHIL NFR BLD AUTO: 7.3 % (ref 0–3)
FERRITIN SERPL-MCNC: 130 NG/ML (ref 8–252)
FERRITIN SERPL-MCNC: 132 NG/ML (ref 8–252)
GFR SERPL CREATININE-BSD FRML MDRD: 59 ML/MIN/{1.73_M2} (ref 60–?)
GLUCOSE SERPL-MCNC: 165 MG/DL (ref 70–100)
GLUCOSE UR QL STRIP.AUTO: (no result) MG/DL
HCT VFR BLD AUTO: 38.3 % (ref 36–47)
HCV AB SER QL: 0 INDEX (ref ?–0.8)
HGB BLD-MCNC: 12.6 G/DL (ref 12–15.5)
HGB UR QL STRIP.AUTO: NEGATIVE
HIV 1+2 AB+HIV1 P24 AG SERPL QL IA: NEGATIVE
IRON SATN MFR SERPL: 26.6 % (ref 13.2–45)
IRON SERPL-MCNC: 80 UG/DL (ref 50–170)
KETONES UR QL STRIP.AUTO: (no result) MG/DL
LEUKOCYTE ESTERASE UR QL STRIP.AUTO: NEGATIVE
LYMPHOCYTES # BLD AUTO: 1.8 10^3/UL (ref 1.5–5)
LYMPHOCYTES NFR BLD AUTO: 33.4 % (ref 24–44)
MAGNESIUM SERPL-MCNC: 2.1 MG/DL (ref 1.8–2.4)
MCH RBC QN AUTO: 32.8 PG (ref 27–33)
MCHC RBC AUTO-ENTMCNC: 32.9 G/DL (ref 32–36.5)
MCV RBC AUTO: 99.7 FL (ref 80–96)
MONOCYTES # BLD AUTO: 0.6 10^3/UL (ref 0–0.8)
MONOCYTES NFR BLD AUTO: 10.8 % (ref 2–8)
N GONORRHOEA RRNA SPEC QL NAA+PROBE: NEGATIVE
NEUTROPHILS # BLD AUTO: 2.6 10^3/UL (ref 1.5–8.5)
NEUTROPHILS NFR BLD AUTO: 47.2 % (ref 36–66)
NITRITE UR QL STRIP.AUTO: NEGATIVE
PH UR STRIP.AUTO: 6 UNITS (ref 5–9)
PHOSPHATE SERPL-MCNC: 3.3 MG/DL (ref 2.5–4.9)
PLATELET # BLD AUTO: 440 10^3/UL (ref 150–450)
POTASSIUM SERPL-SCNC: 4.4 MEQ/L (ref 3.5–5.1)
PROT UR QL STRIP.AUTO: NEGATIVE MG/DL
RBC # BLD AUTO: 3.84 10^6/UL (ref 4–5.4)
RBC # UR AUTO: 0 /HPF (ref 0–3)
SODIUM SERPL-SCNC: 133 MEQ/L (ref 136–145)
SP GR UR STRIP.AUTO: 1.01 (ref 1–1.03)
SQUAMOUS #/AREA URNS AUTO: 4 /HPF (ref 0–6)
T4 FREE SERPL-MCNC: 0.9 NG/DL (ref 0.76–1.46)
T4 FREE SERPL-MCNC: 0.92 NG/DL (ref 0.76–1.46)
TIBC SERPL-MCNC: 301 UG/DL (ref 250–450)
TSH SERPL DL<=0.005 MIU/L-ACNC: 2.11 UIU/ML (ref 0.36–3.74)
TSH SERPL DL<=0.005 MIU/L-ACNC: 2.15 UIU/ML (ref 0.36–3.74)
UROBILINOGEN UR QL STRIP.AUTO: 0.2 MG/DL (ref 0–2)
VIT B12 SERPL-MCNC: 915 PG/ML (ref 247–911)
WBC # BLD AUTO: 5.5 10^3/UL (ref 4–10)
WBC #/AREA URNS AUTO: 0 /HPF (ref 0–3)

## 2021-09-07 ENCOUNTER — HOSPITAL ENCOUNTER (OUTPATIENT)
Dept: HOSPITAL 53 - M LAB | Age: 31
End: 2021-09-07
Attending: NURSE PRACTITIONER
Payer: MEDICARE

## 2021-09-07 ENCOUNTER — HOSPITAL ENCOUNTER (OUTPATIENT)
Dept: HOSPITAL 53 - M LAB | Age: 31
End: 2021-09-07
Payer: MEDICARE

## 2021-09-07 DIAGNOSIS — N18.5: ICD-10-CM

## 2021-09-07 DIAGNOSIS — Z94.0: ICD-10-CM

## 2021-09-07 DIAGNOSIS — Z79.899: ICD-10-CM

## 2021-09-07 DIAGNOSIS — Z76.82: ICD-10-CM

## 2021-09-07 DIAGNOSIS — D84.9: ICD-10-CM

## 2021-09-07 DIAGNOSIS — Z01.818: Primary | ICD-10-CM

## 2021-09-07 DIAGNOSIS — Z51.81: Primary | ICD-10-CM

## 2021-09-07 LAB
ALBUMIN SERPL BCG-MCNC: 3.8 GM/DL (ref 3.2–5.2)
APPEARANCE UR: CLEAR
BACTERIA UR QL AUTO: NEGATIVE
BASOPHILS # BLD AUTO: 0.1 10^3/UL (ref 0–0.2)
BASOPHILS NFR BLD AUTO: 1.1 % (ref 0–1)
BILIRUB UR QL STRIP.AUTO: NEGATIVE
BUN SERPL-MCNC: 13 MG/DL (ref 7–18)
CALCIUM SERPL-MCNC: 9.3 MG/DL (ref 8.5–10.1)
CHLORIDE SERPL-SCNC: 95 MEQ/L (ref 98–107)
CO2 SERPL-SCNC: 34 MEQ/L (ref 21–32)
CREAT SERPL-MCNC: 1.16 MG/DL (ref 0.55–1.3)
CREAT UR-MCNC: < 13 MG/DL
EOSINOPHIL # BLD AUTO: 0.4 10^3/UL (ref 0–0.5)
EOSINOPHIL NFR BLD AUTO: 6.2 % (ref 0–3)
GFR SERPL CREATININE-BSD FRML MDRD: 58.4 ML/MIN/{1.73_M2} (ref 60–?)
GLUCOSE SERPL-MCNC: 130 MG/DL (ref 70–100)
GLUCOSE UR QL STRIP.AUTO: (no result) MG/DL
HCT VFR BLD AUTO: 35.1 % (ref 36–47)
HGB BLD-MCNC: 11.9 G/DL (ref 12–15.5)
HGB UR QL STRIP.AUTO: NEGATIVE
KETONES UR QL STRIP.AUTO: NEGATIVE MG/DL
LEUKOCYTE ESTERASE UR QL STRIP.AUTO: NEGATIVE
LYMPHOCYTES # BLD AUTO: 2.4 10^3/UL (ref 1.5–5)
LYMPHOCYTES NFR BLD AUTO: 36.2 % (ref 24–44)
MAGNESIUM SERPL-MCNC: 2 MG/DL (ref 1.8–2.4)
MCH RBC QN AUTO: 33.1 PG (ref 27–33)
MCHC RBC AUTO-ENTMCNC: 33.9 G/DL (ref 32–36.5)
MCV RBC AUTO: 97.8 FL (ref 80–96)
MONOCYTES # BLD AUTO: 0.8 10^3/UL (ref 0–0.8)
MONOCYTES NFR BLD AUTO: 11.4 % (ref 2–8)
NEUTROPHILS # BLD AUTO: 3 10^3/UL (ref 1.5–8.5)
NEUTROPHILS NFR BLD AUTO: 44.8 % (ref 36–66)
NITRITE UR QL STRIP.AUTO: NEGATIVE
PH UR STRIP.AUTO: 7 UNITS (ref 5–9)
PHOSPHATE SERPL-MCNC: 3.2 MG/DL (ref 2.5–4.9)
PLATELET # BLD AUTO: 395 10^3/UL (ref 150–450)
POTASSIUM SERPL-SCNC: 4.2 MEQ/L (ref 3.5–5.1)
PROT UR QL STRIP.AUTO: NEGATIVE MG/DL
PROT UR-MCNC: 7 MG/DL (ref 0–12)
RBC # BLD AUTO: 3.59 10^6/UL (ref 4–5.4)
RBC # UR AUTO: 0 /HPF (ref 0–3)
SODIUM SERPL-SCNC: 133 MEQ/L (ref 136–145)
SP GR UR STRIP.AUTO: 1 (ref 1–1.03)
SQUAMOUS #/AREA URNS AUTO: 1 /HPF (ref 0–6)
UROBILINOGEN UR QL STRIP.AUTO: 0.2 MG/DL (ref 0–2)
WBC # BLD AUTO: 6.7 10^3/UL (ref 4–10)
WBC #/AREA URNS AUTO: 1 /HPF (ref 0–3)

## 2021-11-09 ENCOUNTER — HOSPITAL ENCOUNTER (OUTPATIENT)
Dept: HOSPITAL 53 - M LAB | Age: 31
End: 2021-11-09
Payer: MEDICARE

## 2021-11-09 ENCOUNTER — HOSPITAL ENCOUNTER (OUTPATIENT)
Dept: HOSPITAL 53 - M LAB | Age: 31
End: 2021-11-09
Attending: NURSE PRACTITIONER
Payer: MEDICARE

## 2021-11-09 DIAGNOSIS — Z01.818: Primary | ICD-10-CM

## 2021-11-09 DIAGNOSIS — N18.5: ICD-10-CM

## 2021-11-09 DIAGNOSIS — Z94.0: Primary | ICD-10-CM

## 2021-11-09 DIAGNOSIS — Z79.899: ICD-10-CM

## 2021-11-09 DIAGNOSIS — D84.9: ICD-10-CM

## 2021-11-09 LAB
ALBUMIN SERPL BCG-MCNC: 3.4 GM/DL (ref 3.2–5.2)
APPEARANCE UR: CLEAR
BACTERIA UR QL AUTO: NEGATIVE
BASOPHILS # BLD AUTO: 0.1 10^3/UL (ref 0–0.2)
BASOPHILS NFR BLD AUTO: 1.6 % (ref 0–1)
BILIRUB UR QL STRIP.AUTO: NEGATIVE
BUN SERPL-MCNC: 10 MG/DL (ref 7–18)
CALCIUM SERPL-MCNC: 9.1 MG/DL (ref 8.5–10.1)
CHLORIDE SERPL-SCNC: 102 MEQ/L (ref 98–107)
CO2 SERPL-SCNC: 34 MEQ/L (ref 21–32)
CREAT SERPL-MCNC: 1.06 MG/DL (ref 0.55–1.3)
CREAT UR-MCNC: 48.4 MG/DL
EOSINOPHIL # BLD AUTO: 0.5 10^3/UL (ref 0–0.5)
EOSINOPHIL NFR BLD AUTO: 9 % (ref 0–3)
GFR SERPL CREATININE-BSD FRML MDRD: > 60 ML/MIN/{1.73_M2} (ref 60–?)
GLUCOSE SERPL-MCNC: 91 MG/DL (ref 70–100)
GLUCOSE UR QL STRIP.AUTO: (no result) MG/DL
HCT VFR BLD AUTO: 36.3 % (ref 36–47)
HGB BLD-MCNC: 11.7 G/DL (ref 12–15.5)
HGB UR QL STRIP.AUTO: NEGATIVE
KETONES UR QL STRIP.AUTO: NEGATIVE MG/DL
LEUKOCYTE ESTERASE UR QL STRIP.AUTO: NEGATIVE
LYMPHOCYTES # BLD AUTO: 2.3 10^3/UL (ref 1.5–5)
LYMPHOCYTES NFR BLD AUTO: 40.6 % (ref 24–44)
MAGNESIUM SERPL-MCNC: 2.4 MG/DL (ref 1.8–2.4)
MCH RBC QN AUTO: 32.2 PG (ref 27–33)
MCHC RBC AUTO-ENTMCNC: 32.2 G/DL (ref 32–36.5)
MCV RBC AUTO: 100 FL (ref 80–96)
MONOCYTES # BLD AUTO: 0.5 10^3/UL (ref 0–0.8)
MONOCYTES NFR BLD AUTO: 9.4 % (ref 2–8)
NEUTROPHILS # BLD AUTO: 2.2 10^3/UL (ref 1.5–8.5)
NEUTROPHILS NFR BLD AUTO: 39 % (ref 36–66)
NITRITE UR QL STRIP.AUTO: NEGATIVE
PH UR STRIP.AUTO: 7 UNITS (ref 5–9)
PHOSPHATE SERPL-MCNC: 3 MG/DL (ref 2.5–4.9)
PLATELET # BLD AUTO: 370 10^3/UL (ref 150–450)
POTASSIUM SERPL-SCNC: 4.2 MEQ/L (ref 3.5–5.1)
PROT UR QL STRIP.AUTO: NEGATIVE MG/DL
PROT UR-MCNC: 14.2 MG/DL (ref 0–12)
RBC # BLD AUTO: 3.63 10^6/UL (ref 4–5.4)
RBC # UR AUTO: 0 /HPF (ref 0–3)
SODIUM SERPL-SCNC: 139 MEQ/L (ref 136–145)
SP GR UR STRIP.AUTO: 1.01 (ref 1–1.03)
SQUAMOUS #/AREA URNS AUTO: 2 /HPF (ref 0–6)
UROBILINOGEN UR QL STRIP.AUTO: 0.2 MG/DL (ref 0–2)
WBC # BLD AUTO: 5.5 10^3/UL (ref 4–10)
WBC #/AREA URNS AUTO: 1 /HPF (ref 0–3)

## 2021-12-20 ENCOUNTER — HOSPITAL ENCOUNTER (OUTPATIENT)
Dept: HOSPITAL 53 - M PLAIMG | Age: 31
End: 2021-12-20
Payer: MEDICARE

## 2021-12-20 DIAGNOSIS — Z01.818: Primary | ICD-10-CM

## 2021-12-20 DIAGNOSIS — Z94.0: ICD-10-CM

## 2022-02-04 ENCOUNTER — HOSPITAL ENCOUNTER (OUTPATIENT)
Dept: HOSPITAL 53 - M PLALAB | Age: 32
End: 2022-02-04
Attending: PHYSICIAN ASSISTANT
Payer: MEDICARE

## 2022-02-04 DIAGNOSIS — Z20.2: ICD-10-CM

## 2022-02-04 DIAGNOSIS — D64.9: Primary | ICD-10-CM

## 2022-02-04 DIAGNOSIS — Z79.899: ICD-10-CM

## 2022-02-04 DIAGNOSIS — E10.65: ICD-10-CM

## 2022-02-04 DIAGNOSIS — E55.9: ICD-10-CM

## 2022-02-04 DIAGNOSIS — E03.9: ICD-10-CM

## 2022-02-04 LAB
25(OH)D3 SERPL-MCNC: 28.1 NG/ML (ref 30–100)
BASOPHILS # BLD AUTO: 0.1 10^3/UL (ref 0–0.2)
BASOPHILS NFR BLD AUTO: 1.3 % (ref 0–1)
EOSINOPHIL # BLD AUTO: 0.5 10^3/UL (ref 0–0.5)
EOSINOPHIL NFR BLD AUTO: 8 % (ref 0–3)
EST. AVERAGE GLUCOSE BLD GHB EST-MCNC: 301 MG/DL (ref 60–110)
FERRITIN SERPL-MCNC: 123 NG/ML (ref 8–252)
HBV CORE IGM SER QL: NEGATIVE
HCT VFR BLD AUTO: 37.1 % (ref 36–47)
HGB BLD-MCNC: 12.3 G/DL (ref 12–15.5)
IRON SERPL-MCNC: 88 UG/DL (ref 50–170)
LYMPHOCYTES # BLD AUTO: 1.8 10^3/UL (ref 1.5–5)
LYMPHOCYTES NFR BLD AUTO: 27.3 % (ref 24–44)
MCH RBC QN AUTO: 32.2 PG (ref 27–33)
MCHC RBC AUTO-ENTMCNC: 33.2 G/DL (ref 32–36.5)
MCV RBC AUTO: 97.1 FL (ref 80–96)
MONOCYTES # BLD AUTO: 0.6 10^3/UL (ref 0–0.8)
MONOCYTES NFR BLD AUTO: 9.2 % (ref 2–8)
NEUTROPHILS # BLD AUTO: 3.6 10^3/UL (ref 1.5–8.5)
NEUTROPHILS NFR BLD AUTO: 54.1 % (ref 36–66)
PLATELET # BLD AUTO: 385 10^3/UL (ref 150–450)
RBC # BLD AUTO: 3.82 10^6/UL (ref 4–5.4)
T4 FREE SERPL-MCNC: 0.95 NG/DL (ref 0.76–1.46)
TSH SERPL DL<=0.005 MIU/L-ACNC: 1.16 UIU/ML (ref 0.36–3.74)
WBC # BLD AUTO: 6.7 10^3/UL (ref 4–10)

## 2022-02-07 LAB
ALBUMIN SERPL BCG-MCNC: 4 GM/DL (ref 3.2–5.2)
ALT SERPL W P-5'-P-CCNC: 41 U/L (ref 12–78)
BILIRUB SERPL-MCNC: 0.3 MG/DL (ref 0.2–1)
BUN SERPL-MCNC: 14 MG/DL (ref 7–18)
CALCIUM SERPL-MCNC: 9.7 MG/DL (ref 8.5–10.1)
CHLORIDE SERPL-SCNC: 96 MEQ/L (ref 98–107)
CO2 SERPL-SCNC: 38 MEQ/L (ref 21–32)
CREAT SERPL-MCNC: 1.24 MG/DL (ref 0.55–1.3)
GFR SERPL CREATININE-BSD FRML MDRD: 54.1 ML/MIN/{1.73_M2} (ref 60–?)
GLUCOSE SERPL-MCNC: 160 MG/DL (ref 70–100)
POTASSIUM SERPL-SCNC: 4.3 MEQ/L (ref 3.5–5.1)
PROT SERPL-MCNC: 6.8 GM/DL (ref 6.4–8.2)
SODIUM SERPL-SCNC: 136 MEQ/L (ref 136–145)

## 2022-04-13 ENCOUNTER — HOSPITAL ENCOUNTER (OUTPATIENT)
Dept: HOSPITAL 53 - M SFHCPLAZ | Age: 32
End: 2022-04-13
Attending: PHYSICIAN ASSISTANT
Payer: MEDICARE

## 2022-04-13 DIAGNOSIS — A60.04: Primary | ICD-10-CM

## 2022-05-02 ENCOUNTER — HOSPITAL ENCOUNTER (OUTPATIENT)
Dept: HOSPITAL 53 - M LAB | Age: 32
End: 2022-05-02
Attending: NURSE PRACTITIONER
Payer: MEDICARE

## 2022-05-02 DIAGNOSIS — Z01.818: Primary | ICD-10-CM

## 2022-06-22 NOTE — IPN
DATE:  05/06/2020

 

SUBJECTIVE:  Patient was seen and examined at the bedside today morning.  Patient

is afebrile, hemodynamically stable.  She reports that she is feeling better

today.  However, she was hypertensive and she needed another dose of intravenous

(IV) hydralazine early morning.  She was dialyzed yesterday.  She tolerated the

hemodialysis procedure well.

 

OBJECTIVE:

Vital signs:  Temperature is 97.1 degrees Fahrenheit, blood pressure 148/100,

pulse is 99, respiratory rate of 17, saturating 98% on room air.

Intake and output:  Ultrafiltration with hemodialysis was 1 liter yesterday.

Weight on the bed scale is stable at 44 kg.

 

PHYSICAL EXAMINATION:

General:  Patient is awake, alert, oriented times three, laying in bed, in no

apparent distress.

Head and neck exam:  Extraocular muscles intact.  Pupils are equally round and

reactive to light.  Mucous membranes are moist.  Neck is supple.  There is no

jugular venous distention (JVD).  She has a right internal jugular (IJ) tunneled

hemodialysis catheter.

Cardiovascular:  S1, S2, regular rate.  No edema of the bilateral lower

extremities.

Respiratory:  Chest is clear to auscultation bilaterally.  Bilateral equal air

entry.  No rales or rhonchi.

Abdomen:  Soft.  Positive bowel sounds.  Nontender.  No organomegaly.

Musculoskeletal:  No clubbing or cyanosis.  Pulses are 2+.

Central nervous system (CNS):  No focal deficit.  Power is 5/5 in all

extremities.

 

LAB REVIEW:  CBC showed WBC 8.9, hemoglobin 10.5, platelets are 328.

 

BMP showed sodium 133, potassium 4.9, chloride 96, bicarbonate 31, BUN 22,

creatinine is 6.1.  Calcium has improved to 9.6 today. Albumin is 3.1.

 

Urine culture came back positive for Enterococcus faecalis, more than 100,000

colonies.

 

CURRENT INPATIENT MEDICATIONS:  Patient's medications were all reviewed by

myself.  She has been started on Coreg 3.125 mg by mouth twice a day, and I have

also started her on Levaquin 250 mg by mouth daily.  No other change in the

medications today as compared with yesterday.

 

ASSESSMENT AND PLAN:

1.  End-stage renal disease.  Patient was dialyzed yesterday.  She tolerated the

procedure well.  Next hemodialysis will be done tomorrow morning.

 

2.  Hypertensive urgency.  Patient's blood pressure is better optimized now.

Continue current dose of amlodipine 10 mg daily.  She needed a dose of

hydralazine, so I have started the patient on Coreg 3.15 mg by mouth twice a

day.

 

3.  Hypercalcemia.  Calcium level is improved now.  Avoid use of calcitriol or

calcium-based binders.

 

4.  Urinary tract infection.  Patient was given Zosyn on arrival.  I have started

her on Levaquin 250 mg by mouth daily.

 

5.  Diabetes mellitus type 1.  Continue current dose of insulin.

 

6.  Disposition.  Hopefully, if patient's blood pressure stays stable, she should

be able to be discharged after dialysis tomorrow afternoon. Dear Meche,     Here are your recent results.  These results do not change our current plan of care.     If you have any questions, please contact the nurse coordinator according to your clinic location:     [facility]:  [place]: [phone]    [facility]  [place]: [phone]    [facility]:  [place]: [phone]      Stef Lucero MD    Pediatric Gastroenterology, Hepatology and Nutrition  [facility]

## 2023-07-17 ENCOUNTER — HOSPITAL ENCOUNTER (INPATIENT)
Dept: HOSPITAL 53 - M ED | Age: 33
Discharge: ANOTHER HOSPITAL | DRG: 683 | End: 2023-07-17
Attending: INTERNAL MEDICINE | Admitting: INTERNAL MEDICINE
Payer: MEDICARE

## 2023-07-17 VITALS — DIASTOLIC BLOOD PRESSURE: 114 MMHG | SYSTOLIC BLOOD PRESSURE: 178 MMHG

## 2023-07-17 VITALS — SYSTOLIC BLOOD PRESSURE: 156 MMHG | TEMPERATURE: 97.4 F | DIASTOLIC BLOOD PRESSURE: 110 MMHG | OXYGEN SATURATION: 99 %

## 2023-07-17 VITALS — SYSTOLIC BLOOD PRESSURE: 178 MMHG | TEMPERATURE: 97.4 F | OXYGEN SATURATION: 100 % | DIASTOLIC BLOOD PRESSURE: 114 MMHG

## 2023-07-17 VITALS — HEIGHT: 60 IN | BODY MASS INDEX: 19.67 KG/M2 | WEIGHT: 100.2 LBS

## 2023-07-17 DIAGNOSIS — I95.1: ICD-10-CM

## 2023-07-17 DIAGNOSIS — D50.9: ICD-10-CM

## 2023-07-17 DIAGNOSIS — K52.9: ICD-10-CM

## 2023-07-17 DIAGNOSIS — I12.9: ICD-10-CM

## 2023-07-17 DIAGNOSIS — E87.6: ICD-10-CM

## 2023-07-17 DIAGNOSIS — Z79.899: ICD-10-CM

## 2023-07-17 DIAGNOSIS — E86.0: ICD-10-CM

## 2023-07-17 DIAGNOSIS — Z20.822: ICD-10-CM

## 2023-07-17 DIAGNOSIS — Z79.890: ICD-10-CM

## 2023-07-17 DIAGNOSIS — K21.9: ICD-10-CM

## 2023-07-17 DIAGNOSIS — N39.0: ICD-10-CM

## 2023-07-17 DIAGNOSIS — E83.52: ICD-10-CM

## 2023-07-17 DIAGNOSIS — D84.81: ICD-10-CM

## 2023-07-17 DIAGNOSIS — N17.9: Primary | ICD-10-CM

## 2023-07-17 DIAGNOSIS — Z88.6: ICD-10-CM

## 2023-07-17 DIAGNOSIS — E10.22: ICD-10-CM

## 2023-07-17 DIAGNOSIS — E03.9: ICD-10-CM

## 2023-07-17 DIAGNOSIS — Z79.4: ICD-10-CM

## 2023-07-17 DIAGNOSIS — M06.9: ICD-10-CM

## 2023-07-17 DIAGNOSIS — E87.8: ICD-10-CM

## 2023-07-17 DIAGNOSIS — N18.9: ICD-10-CM

## 2023-07-17 DIAGNOSIS — F41.9: ICD-10-CM

## 2023-07-17 DIAGNOSIS — E87.1: ICD-10-CM

## 2023-07-17 DIAGNOSIS — Z94.0: ICD-10-CM

## 2023-07-17 LAB
ALBUMIN SERPL BCG-MCNC: 2.9 G/DL (ref 3.2–5.2)
ALBUMIN SERPL BCG-MCNC: 3.2 G/DL (ref 3.2–5.2)
ALBUMIN SERPL BCG-MCNC: 3.8 G/DL (ref 3.2–5.2)
ALP SERPL-CCNC: 71 U/L (ref 46–116)
ALP SERPL-CCNC: 75 U/L (ref 46–116)
ALP SERPL-CCNC: 87 U/L (ref 46–116)
ALT SERPL W P-5'-P-CCNC: 13 U/L (ref 7–40)
ALT SERPL W P-5'-P-CCNC: 14 U/L (ref 7–40)
ALT SERPL W P-5'-P-CCNC: 16 U/L (ref 7–40)
APTT BLD: 25.8 SECONDS (ref 24.8–34.2)
AST SERPL-CCNC: 18 U/L (ref ?–34)
AST SERPL-CCNC: 19 U/L (ref ?–34)
AST SERPL-CCNC: 20 U/L (ref ?–34)
BASOPHILS # BLD AUTO: 0 10^3/UL (ref 0–0.2)
BASOPHILS # BLD AUTO: 0 10^3/UL (ref 0–0.2)
BASOPHILS NFR BLD AUTO: 0.4 % (ref 0–1)
BASOPHILS NFR BLD AUTO: 0.5 % (ref 0–1)
BILIRUB CONJ SERPL-MCNC: 0.1 MG/DL (ref ?–0.4)
BILIRUB SERPL-MCNC: 0.4 MG/DL (ref 0.3–1.2)
BILIRUB SERPL-MCNC: 0.4 MG/DL (ref 0.3–1.2)
BILIRUB SERPL-MCNC: 0.5 MG/DL (ref 0.3–1.2)
BUN SERPL-MCNC: 51 MG/DL (ref 9–23)
BUN SERPL-MCNC: 52 MG/DL (ref 9–23)
BUN SERPL-MCNC: 55 MG/DL (ref 9–23)
CALCIUM SERPL-MCNC: 11.4 MG/DL (ref 8.5–10.1)
CALCIUM SERPL-MCNC: 12.5 MG/DL (ref 8.5–10.1)
CALCIUM SERPL-MCNC: 14.4 MG/DL (ref 8.5–10.1)
CHLORIDE SERPL-SCNC: 78 MMOL/L (ref 98–107)
CHLORIDE SERPL-SCNC: 82 MMOL/L (ref 98–107)
CHLORIDE SERPL-SCNC: 88 MMOL/L (ref 98–107)
CO2 SERPL-SCNC: 35 MMOL/L (ref 20–31)
CO2 SERPL-SCNC: > 40 MMOL/L (ref 20–31)
CO2 SERPL-SCNC: > 40 MMOL/L (ref 20–31)
CREAT SERPL-MCNC: 3.53 MG/DL (ref 0.55–1.3)
CREAT SERPL-MCNC: 3.59 MG/DL (ref 0.55–1.3)
CREAT SERPL-MCNC: 3.71 MG/DL (ref 0.55–1.3)
EOSINOPHIL # BLD AUTO: 0.1 10^3/UL (ref 0–0.5)
EOSINOPHIL # BLD AUTO: 0.1 10^3/UL (ref 0–0.5)
EOSINOPHIL NFR BLD AUTO: 1.8 % (ref 0–3)
EOSINOPHIL NFR BLD AUTO: 2.1 % (ref 0–3)
GFR SERPL CREATININE-BSD FRML MDRD: 15.1 ML/MIN/{1.73_M2} (ref 60–?)
GFR SERPL CREATININE-BSD FRML MDRD: 15.6 ML/MIN/{1.73_M2} (ref 60–?)
GFR SERPL CREATININE-BSD FRML MDRD: 16 ML/MIN/{1.73_M2} (ref 60–?)
GLUCOSE SERPL-MCNC: 189 MG/DL (ref 60–100)
GLUCOSE SERPL-MCNC: 196 MG/DL (ref 60–100)
GLUCOSE SERPL-MCNC: 198 MG/DL (ref 60–100)
HCT VFR BLD AUTO: 26.5 % (ref 36–47)
HCT VFR BLD AUTO: 26.8 % (ref 36–47)
HGB BLD-MCNC: 9.5 G/DL (ref 12–15.5)
HGB BLD-MCNC: 9.6 G/DL (ref 12–15.5)
INR PPP: 0.91
LIPASE SERPL-CCNC: 40 U/L (ref 12–53)
LYMPHOCYTES # BLD AUTO: 1.6 10^3/UL (ref 1.5–5)
LYMPHOCYTES # BLD AUTO: 1.9 10^3/UL (ref 1.5–5)
LYMPHOCYTES NFR BLD AUTO: 26 % (ref 24–44)
LYMPHOCYTES NFR BLD AUTO: 28.4 % (ref 24–44)
MAGNESIUM SERPL-MCNC: 1.9 MG/DL (ref 1.8–2.4)
MCH RBC QN AUTO: 32.9 PG (ref 27–33)
MCH RBC QN AUTO: 33.1 PG (ref 27–33)
MCHC RBC AUTO-ENTMCNC: 35.4 G/DL (ref 32–36.5)
MCHC RBC AUTO-ENTMCNC: 36.2 G/DL (ref 32–36.5)
MCV RBC AUTO: 91.4 FL (ref 80–96)
MCV RBC AUTO: 92.7 FL (ref 80–96)
MONOCYTES # BLD AUTO: 0.5 10^3/UL (ref 0–0.8)
MONOCYTES # BLD AUTO: 0.6 10^3/UL (ref 0–0.8)
MONOCYTES NFR BLD AUTO: 8.4 % (ref 2–8)
MONOCYTES NFR BLD AUTO: 9.1 % (ref 2–8)
NEUTROPHILS # BLD AUTO: 3.8 10^3/UL (ref 1.5–8.5)
NEUTROPHILS # BLD AUTO: 4.1 10^3/UL (ref 1.5–8.5)
NEUTROPHILS NFR BLD AUTO: 59.7 % (ref 36–66)
NEUTROPHILS NFR BLD AUTO: 62.8 % (ref 36–66)
PLATELET # BLD AUTO: 408 10^3/UL (ref 150–450)
PLATELET # BLD AUTO: 449 10^3/UL (ref 150–450)
POTASSIUM SERPL-SCNC: 2.7 MMOL/L (ref 3.5–5.1)
POTASSIUM SERPL-SCNC: 3.1 MMOL/L (ref 3.5–5.1)
POTASSIUM SERPL-SCNC: 3.2 MMOL/L (ref 3.5–5.1)
PROT SERPL-MCNC: 5.7 G/DL (ref 5.7–8.2)
PROT SERPL-MCNC: 5.8 G/DL (ref 5.7–8.2)
PROT SERPL-MCNC: 7 G/DL (ref 5.7–8.2)
PROTHROMBIN TIME: 12.5 SECONDS (ref 12.5–14.5)
RBC # BLD AUTO: 2.89 10^6/UL (ref 4–5.4)
RBC # BLD AUTO: 2.9 10^6/UL (ref 4–5.4)
SODIUM SERPL-SCNC: 127 MMOL/L (ref 136–145)
SODIUM SERPL-SCNC: 129 MMOL/L (ref 136–145)
SODIUM SERPL-SCNC: 131 MMOL/L (ref 136–145)
WBC # BLD AUTO: 6.1 10^3/UL (ref 4–10)
WBC # BLD AUTO: 6.8 10^3/UL (ref 4–10)

## 2023-07-17 RX ADMIN — POTASSIUM CHLORIDE SCH MEQ: 750 TABLET, EXTENDED RELEASE ORAL at 14:47

## 2023-07-17 RX ADMIN — INSULIN LISPRO SCH UNITS: 100 INJECTION, SOLUTION INTRAVENOUS; SUBCUTANEOUS at 16:45

## 2023-07-17 RX ADMIN — POTASSIUM CHLORIDE SCH MEQ: 750 TABLET, EXTENDED RELEASE ORAL at 16:46

## 2023-07-17 RX ADMIN — POTASSIUM CHLORIDE SCH MEQ: 750 TABLET, EXTENDED RELEASE ORAL at 11:39

## 2023-07-17 RX ADMIN — INSULIN LISPRO SCH UNITS: 100 INJECTION, SOLUTION INTRAVENOUS; SUBCUTANEOUS at 14:19

## 2023-09-26 ENCOUNTER — HOSPITAL ENCOUNTER (EMERGENCY)
Dept: HOSPITAL 53 - M ED | Age: 33
Discharge: ANOTHER HOSPITAL | End: 2023-09-26
Payer: MEDICARE

## 2023-09-26 VITALS — OXYGEN SATURATION: 96 % | DIASTOLIC BLOOD PRESSURE: 76 MMHG | SYSTOLIC BLOOD PRESSURE: 113 MMHG

## 2023-09-26 VITALS — BODY MASS INDEX: 19.19 KG/M2 | WEIGHT: 101.63 LBS | HEIGHT: 61 IN

## 2023-09-26 VITALS — TEMPERATURE: 98.7 F

## 2023-09-26 DIAGNOSIS — Z88.6: ICD-10-CM

## 2023-09-26 DIAGNOSIS — Z79.899: ICD-10-CM

## 2023-09-26 DIAGNOSIS — Z94.0: ICD-10-CM

## 2023-09-26 DIAGNOSIS — E10.9: Primary | ICD-10-CM

## 2023-09-26 DIAGNOSIS — Z79.810: ICD-10-CM

## 2023-09-26 DIAGNOSIS — Z79.52: ICD-10-CM

## 2023-09-26 DIAGNOSIS — F12.10: ICD-10-CM

## 2023-09-26 LAB
ALBUMIN SERPL BCG-MCNC: 3.5 G/DL (ref 3.2–5.2)
ALP SERPL-CCNC: 88 U/L (ref 46–116)
ALT SERPL W P-5'-P-CCNC: 23 U/L (ref 7–40)
AST SERPL-CCNC: 37 U/L (ref ?–34)
BASOPHILS # BLD AUTO: 0.1 10^3/UL (ref 0–0.2)
BASOPHILS NFR BLD AUTO: 0.9 % (ref 0–1)
BILIRUB CONJ SERPL-MCNC: < 0.1 MG/DL (ref ?–0.4)
BILIRUB SERPL-MCNC: 0.5 MG/DL (ref 0.3–1.2)
BUN SERPL-MCNC: 45 MG/DL (ref 9–23)
CALCIUM SERPL-MCNC: 12.6 MG/DL (ref 8.5–10.1)
CHLORIDE SERPL-SCNC: 80 MMOL/L (ref 98–107)
CO2 SERPL-SCNC: > 40 MMOL/L (ref 20–31)
CREAT SERPL-MCNC: 2.51 MG/DL (ref 0.55–1.3)
EOSINOPHIL # BLD AUTO: 0.1 10^3/UL (ref 0–0.5)
EOSINOPHIL NFR BLD AUTO: 1.1 % (ref 0–3)
GFR SERPL CREATININE-BSD FRML MDRD: 23.7 ML/MIN/{1.73_M2} (ref 60–?)
GLUCOSE SERPL-MCNC: 364 MG/DL (ref 60–100)
HCT VFR BLD AUTO: 27.4 % (ref 36–47)
HGB BLD-MCNC: 9.7 G/DL (ref 12–15.5)
LIPASE SERPL-CCNC: 37 U/L (ref 12–53)
LYMPHOCYTES # BLD AUTO: 1.9 10^3/UL (ref 1.5–5)
LYMPHOCYTES NFR BLD AUTO: 22.9 % (ref 24–44)
MCH RBC QN AUTO: 32.8 PG (ref 27–33)
MCHC RBC AUTO-ENTMCNC: 35.4 G/DL (ref 32–36.5)
MCV RBC AUTO: 92.6 FL (ref 80–96)
MONOCYTES # BLD AUTO: 0.8 10^3/UL (ref 0–0.8)
MONOCYTES NFR BLD AUTO: 10 % (ref 2–8)
NEUTROPHILS # BLD AUTO: 5.3 10^3/UL (ref 1.5–8.5)
NEUTROPHILS NFR BLD AUTO: 64.7 % (ref 36–66)
PLATELET # BLD AUTO: 395 10^3/UL (ref 150–450)
POTASSIUM SERPL-SCNC: 4.1 MMOL/L (ref 3.5–5.1)
PROT SERPL-MCNC: 6.7 G/DL (ref 5.7–8.2)
RBC # BLD AUTO: 2.96 10^6/UL (ref 4–5.4)
RSV RNA NPH QL NAA+PROBE: NEGATIVE
SODIUM SERPL-SCNC: 128 MMOL/L (ref 136–145)
WBC # BLD AUTO: 8.1 10^3/UL (ref 4–10)

## 2023-09-26 PROCEDURE — 87631 RESP VIRUS 3-5 TARGETS: CPT

## 2023-09-26 PROCEDURE — 93041 RHYTHM ECG TRACING: CPT

## 2023-09-26 PROCEDURE — 93005 ELECTROCARDIOGRAM TRACING: CPT

## 2023-09-26 PROCEDURE — 80048 BASIC METABOLIC PNL TOTAL CA: CPT

## 2023-09-26 PROCEDURE — 96374 THER/PROPH/DIAG INJ IV PUSH: CPT

## 2023-09-26 PROCEDURE — 80076 HEPATIC FUNCTION PANEL: CPT

## 2023-09-26 PROCEDURE — 83690 ASSAY OF LIPASE: CPT

## 2023-09-26 PROCEDURE — 85025 COMPLETE CBC W/AUTO DIFF WBC: CPT

## 2023-09-26 PROCEDURE — 96361 HYDRATE IV INFUSION ADD-ON: CPT

## 2023-09-26 PROCEDURE — 99285 EMERGENCY DEPT VISIT HI MDM: CPT

## 2023-12-22 ENCOUNTER — HOSPITAL ENCOUNTER (OUTPATIENT)
Dept: HOSPITAL 53 - M LAB | Age: 33
End: 2023-12-22
Attending: NURSE PRACTITIONER
Payer: MEDICARE

## 2023-12-22 ENCOUNTER — HOSPITAL ENCOUNTER (OUTPATIENT)
Dept: HOSPITAL 53 - M LAB | Age: 33
End: 2023-12-22
Attending: FAMILY MEDICINE
Payer: MEDICARE

## 2023-12-22 DIAGNOSIS — Z79.899: ICD-10-CM

## 2023-12-22 DIAGNOSIS — D64.9: ICD-10-CM

## 2023-12-22 DIAGNOSIS — F50.2: ICD-10-CM

## 2023-12-22 DIAGNOSIS — D84.9: ICD-10-CM

## 2023-12-22 DIAGNOSIS — Z94.0: ICD-10-CM

## 2023-12-22 DIAGNOSIS — M06.9: Primary | ICD-10-CM

## 2023-12-22 DIAGNOSIS — N18.5: ICD-10-CM

## 2023-12-22 DIAGNOSIS — Z94.0: Primary | ICD-10-CM

## 2023-12-22 LAB
ALBUMIN SERPL BCG-MCNC: 3.3 G/DL (ref 3.2–5.2)
ALBUMIN SERPL BCG-MCNC: 3.4 G/DL (ref 3.2–5.2)
ALP SERPL-CCNC: 78 U/L (ref 46–116)
ALP SERPL-CCNC: 79 U/L (ref 46–116)
ALT SERPL W P-5'-P-CCNC: 33 U/L (ref 7–40)
ALT SERPL W P-5'-P-CCNC: 33 U/L (ref 7–40)
AMORPH SED URNS QL MICRO: (no result)
APPEARANCE UR: (no result)
AST SERPL-CCNC: 28 U/L (ref ?–34)
AST SERPL-CCNC: 28 U/L (ref ?–34)
BACTERIA UR QL AUTO: NEGATIVE
BASOPHILS # BLD AUTO: 0.1 10^3/UL (ref 0–0.2)
BASOPHILS # BLD AUTO: 0.1 10^3/UL (ref 0–0.2)
BASOPHILS NFR BLD AUTO: 1.1 % (ref 0–1)
BASOPHILS NFR BLD AUTO: 1.2 % (ref 0–1)
BILIRUB CONJ SERPL-MCNC: < 0.1 MG/DL (ref ?–0.4)
BILIRUB SERPL-MCNC: 0.3 MG/DL (ref 0.3–1.2)
BILIRUB SERPL-MCNC: 0.3 MG/DL (ref 0.3–1.2)
BILIRUB UR QL STRIP.AUTO: NEGATIVE
BUN SERPL-MCNC: 34 MG/DL (ref 9–23)
BUN SERPL-MCNC: 34 MG/DL (ref 9–23)
CALCIUM SERPL-MCNC: 10 MG/DL (ref 8.5–10.1)
CALCIUM SERPL-MCNC: 10.1 MG/DL (ref 8.5–10.1)
CHLORIDE SERPL-SCNC: 96 MMOL/L (ref 98–107)
CHLORIDE SERPL-SCNC: 98 MMOL/L (ref 98–107)
CO2 SERPL-SCNC: 32 MMOL/L (ref 20–31)
CO2 SERPL-SCNC: 33 MMOL/L (ref 20–31)
CREAT SERPL-MCNC: 1.84 MG/DL (ref 0.55–1.3)
CREAT SERPL-MCNC: 1.87 MG/DL (ref 0.55–1.3)
CREAT UR-MCNC: 60.3 MG/DL
CRP SERPL-MCNC: < 0.4 MG/DL (ref ?–1)
EOSINOPHIL # BLD AUTO: 0.4 10^3/UL (ref 0–0.5)
EOSINOPHIL # BLD AUTO: 0.4 10^3/UL (ref 0–0.5)
EOSINOPHIL NFR BLD AUTO: 7 % (ref 0–3)
EOSINOPHIL NFR BLD AUTO: 7.4 % (ref 0–3)
ERYTHROCYTE [SEDIMENTATION RATE] IN BLOOD BY WESTERGREN METHOD: 32 MM/HR (ref 0–20)
FERRITIN SERPL-MCNC: 5.9 NG/ML (ref 7.3–270.7)
GFR SERPL CREATININE-BSD FRML MDRD: 33.2 ML/MIN/{1.73_M2} (ref 60–?)
GFR SERPL CREATININE-BSD FRML MDRD: 33.8 ML/MIN/{1.73_M2} (ref 60–?)
GLUCOSE SERPL-MCNC: 252 MG/DL (ref 60–100)
GLUCOSE SERPL-MCNC: 256 MG/DL (ref 60–100)
GLUCOSE UR QL STRIP.AUTO: (no result) MG/DL
HCT VFR BLD AUTO: 31.1 % (ref 36–47)
HCT VFR BLD AUTO: 31.4 % (ref 36–47)
HGB BLD-MCNC: 10.4 G/DL (ref 12–15.5)
HGB BLD-MCNC: 10.5 G/DL (ref 12–15.5)
HGB UR QL STRIP.AUTO: NEGATIVE
KETONES UR QL STRIP.AUTO: NEGATIVE MG/DL
LEUKOCYTE ESTERASE UR QL STRIP.AUTO: (no result)
LYMPHOCYTES # BLD AUTO: 1.8 10^3/UL (ref 1.5–5)
LYMPHOCYTES # BLD AUTO: 1.9 10^3/UL (ref 1.5–5)
LYMPHOCYTES NFR BLD AUTO: 34.4 % (ref 24–44)
LYMPHOCYTES NFR BLD AUTO: 35 % (ref 24–44)
MAGNESIUM SERPL-MCNC: 1.9 MG/DL (ref 1.8–2.4)
MCH RBC QN AUTO: 31.9 PG (ref 27–33)
MCH RBC QN AUTO: 31.9 PG (ref 27–33)
MCHC RBC AUTO-ENTMCNC: 33.4 G/DL (ref 32–36.5)
MCHC RBC AUTO-ENTMCNC: 33.4 G/DL (ref 32–36.5)
MCV RBC AUTO: 95.4 FL (ref 80–96)
MCV RBC AUTO: 95.4 FL (ref 80–96)
MONOCYTES # BLD AUTO: 0.5 10^3/UL (ref 0–0.8)
MONOCYTES # BLD AUTO: 0.5 10^3/UL (ref 0–0.8)
MONOCYTES NFR BLD AUTO: 8.8 % (ref 2–8)
MONOCYTES NFR BLD AUTO: 9.6 % (ref 2–8)
NEUTROPHILS # BLD AUTO: 2.5 10^3/UL (ref 1.5–8.5)
NEUTROPHILS # BLD AUTO: 2.5 10^3/UL (ref 1.5–8.5)
NEUTROPHILS NFR BLD AUTO: 47.3 % (ref 36–66)
NEUTROPHILS NFR BLD AUTO: 48 % (ref 36–66)
NITRITE UR QL STRIP.AUTO: NEGATIVE
PH UR STRIP.AUTO: 7 UNITS (ref 5–9)
PHOSPHATE SERPL-MCNC: 3.1 MG/DL (ref 2.5–4.9)
PHOSPHATE SERPL-MCNC: 3.1 MG/DL (ref 2.5–4.9)
PLATELET # BLD AUTO: 355 10^3/UL (ref 150–450)
PLATELET # BLD AUTO: 372 10^3/UL (ref 150–450)
POTASSIUM SERPL-SCNC: 4.1 MMOL/L (ref 3.5–5.1)
POTASSIUM SERPL-SCNC: 4.1 MMOL/L (ref 3.5–5.1)
PROT SERPL-MCNC: 6.1 G/DL (ref 5.7–8.2)
PROT SERPL-MCNC: 6.1 G/DL (ref 5.7–8.2)
PROT UR QL STRIP.AUTO: (no result) MG/DL
PROT UR-MCNC: 26.2 MG/DL (ref 0–14)
RBC # BLD AUTO: 3.26 10^6/UL (ref 4–5.4)
RBC # BLD AUTO: 3.29 10^6/UL (ref 4–5.4)
RBC # UR AUTO: 0 /HPF (ref 0–3)
RHEUMATOID FACT SERPL-ACNC: 8.8 IU/ML (ref ?–14)
SODIUM SERPL-SCNC: 136 MMOL/L (ref 136–145)
SODIUM SERPL-SCNC: 138 MMOL/L (ref 136–145)
SP GR UR STRIP.AUTO: 1.01 (ref 1–1.03)
SQUAMOUS #/AREA URNS AUTO: 4 /HPF (ref 0–6)
TRANS CELLS #/AREA URNS HPF: 1 /HPF
URATE SERPL-MCNC: 5.5 MG/DL (ref 3.1–7.8)
UROBILINOGEN UR QL STRIP.AUTO: 0.2 MG/DL (ref 0–2)
WBC # BLD AUTO: 5.1 10^3/UL (ref 4–10)
WBC # BLD AUTO: 5.3 10^3/UL (ref 4–10)
WBC #/AREA URNS AUTO: 3 /HPF (ref 0–3)

## 2023-12-23 LAB
ANA INTERCELL BRIDGE TITR SER IF: (no result) {TITER}
ANA NUCLEAR DOTS TITR SER IF: (no result) {TITER}
ANA NUCLEAR MEMBRANE PORES TITR SER IF: (no result) {TITER}
CCP IGG SERPL-ACNC: 16 UNITS (ref 0–19)
DEPRECATED CENTRIOLE AB TITR SER IF: (no result) {TITER}
ENA PCNA AB TITR SER IF: (no result) {TITER}
MITOTIC SPINDLE APP AB TITR SERPL IF: (no result) {TITER}

## 2023-12-26 LAB — SIROLIMUS BLD-MCNC: (no result) NG/ML

## 2024-01-12 ENCOUNTER — HOSPITAL ENCOUNTER (OUTPATIENT)
Dept: HOSPITAL 53 - M LAB | Age: 34
End: 2024-01-12
Attending: NURSE PRACTITIONER
Payer: MEDICARE

## 2024-01-12 ENCOUNTER — HOSPITAL ENCOUNTER (OUTPATIENT)
Dept: HOSPITAL 53 - M LAB | Age: 34
End: 2024-01-12
Payer: MEDICARE

## 2024-01-12 DIAGNOSIS — E55.9: ICD-10-CM

## 2024-01-12 DIAGNOSIS — Z94.0: ICD-10-CM

## 2024-01-12 DIAGNOSIS — N18.5: Primary | ICD-10-CM

## 2024-01-12 DIAGNOSIS — Z79.899: ICD-10-CM

## 2024-01-12 DIAGNOSIS — E10.65: ICD-10-CM

## 2024-01-12 DIAGNOSIS — D84.9: ICD-10-CM

## 2024-01-12 LAB
25(OH)D3 SERPL-MCNC: 32 NG/ML (ref 20–100)
ALBUMIN SERPL BCG-MCNC: 3.3 G/DL (ref 3.2–5.2)
ALBUMIN SERPL BCG-MCNC: 3.3 G/DL (ref 3.2–5.2)
ALP SERPL-CCNC: 84 U/L (ref 46–116)
ALP SERPL-CCNC: 84 U/L (ref 46–116)
ALT SERPL W P-5'-P-CCNC: 22 U/L (ref 7–40)
ALT SERPL W P-5'-P-CCNC: 22 U/L (ref 7–40)
AMORPH SED URNS QL MICRO: (no result)
APPEARANCE UR: (no result)
AST SERPL-CCNC: 17 U/L (ref ?–34)
AST SERPL-CCNC: 19 U/L (ref ?–34)
BACTERIA UR QL AUTO: (no result)
BASOPHILS # BLD AUTO: 0.1 10^3/UL (ref 0–0.2)
BASOPHILS NFR BLD AUTO: 0.8 % (ref 0–1)
BILIRUB CONJ SERPL-MCNC: < 0.1 MG/DL (ref ?–0.4)
BILIRUB SERPL-MCNC: 0.2 MG/DL (ref 0.3–1.2)
BILIRUB SERPL-MCNC: 0.3 MG/DL (ref 0.3–1.2)
BILIRUB UR QL STRIP.AUTO: NEGATIVE
BUN SERPL-MCNC: 24 MG/DL (ref 9–23)
BUN SERPL-MCNC: 24 MG/DL (ref 9–23)
CALCIUM SERPL-MCNC: 10.7 MG/DL (ref 8.5–10.1)
CALCIUM SERPL-MCNC: 11 MG/DL (ref 8.5–10.1)
CHLORIDE SERPL-SCNC: 92 MMOL/L (ref 98–107)
CHLORIDE SERPL-SCNC: 93 MMOL/L (ref 98–107)
CO2 SERPL-SCNC: 38 MMOL/L (ref 20–31)
CO2 SERPL-SCNC: 39 MMOL/L (ref 20–31)
CREAT SERPL-MCNC: 1.92 MG/DL (ref 0.55–1.3)
CREAT SERPL-MCNC: 1.95 MG/DL (ref 0.55–1.3)
CREAT UR-MCNC: 57.5 MG/DL
EOSINOPHIL # BLD AUTO: 0.7 10^3/UL (ref 0–0.5)
EOSINOPHIL NFR BLD AUTO: 9 % (ref 0–3)
GFR SERPL CREATININE-BSD FRML MDRD: 31.6 ML/MIN/{1.73_M2} (ref 60–?)
GFR SERPL CREATININE-BSD FRML MDRD: 32.2 ML/MIN/{1.73_M2} (ref 60–?)
GLUCOSE SERPL-MCNC: 276 MG/DL (ref 60–100)
GLUCOSE SERPL-MCNC: 283 MG/DL (ref 60–100)
GLUCOSE UR QL STRIP.AUTO: (no result) MG/DL
HCT VFR BLD AUTO: 30.3 % (ref 36–47)
HGB BLD-MCNC: 9.9 G/DL (ref 12–15.5)
HGB UR QL STRIP.AUTO: NEGATIVE
KETONES UR QL STRIP.AUTO: NEGATIVE MG/DL
LEUKOCYTE ESTERASE UR QL STRIP.AUTO: (no result)
LYMPHOCYTES # BLD AUTO: 1.6 10^3/UL (ref 1.5–5)
LYMPHOCYTES NFR BLD AUTO: 21.8 % (ref 24–44)
MAGNESIUM SERPL-MCNC: 2.1 MG/DL (ref 1.8–2.4)
MCH RBC QN AUTO: 31.5 PG (ref 27–33)
MCHC RBC AUTO-ENTMCNC: 32.7 G/DL (ref 32–36.5)
MCV RBC AUTO: 96.5 FL (ref 80–96)
MICROALBUMIN UR-MCNC: 40 MG/L
MICROALBUMIN/CREAT UR: 69.5 MCG/MG (ref 0–30)
MONOCYTES # BLD AUTO: 0.5 10^3/UL (ref 0–0.8)
MONOCYTES NFR BLD AUTO: 7.1 % (ref 2–8)
NEUTROPHILS # BLD AUTO: 4.5 10^3/UL (ref 1.5–8.5)
NEUTROPHILS NFR BLD AUTO: 61 % (ref 36–66)
NITRITE UR QL STRIP.AUTO: NEGATIVE
PH UR STRIP.AUTO: 9 UNITS (ref 5–9)
PHOSPHATE SERPL-MCNC: 4.1 MG/DL (ref 2.5–4.9)
PLATELET # BLD AUTO: 422 10^3/UL (ref 150–450)
POTASSIUM SERPL-SCNC: 4.1 MMOL/L (ref 3.5–5.1)
POTASSIUM SERPL-SCNC: 4.2 MMOL/L (ref 3.5–5.1)
PROT SERPL-MCNC: 6 G/DL (ref 5.7–8.2)
PROT SERPL-MCNC: 6 G/DL (ref 5.7–8.2)
PROT UR QL STRIP.AUTO: (no result) MG/DL
RBC # BLD AUTO: 3.14 10^6/UL (ref 4–5.4)
RBC # UR AUTO: 1 /HPF (ref 0–3)
SIROLIMUS BLD-MCNC: (no result) NG/ML
SODIUM SERPL-SCNC: 133 MMOL/L (ref 136–145)
SODIUM SERPL-SCNC: 135 MMOL/L (ref 136–145)
SP GR UR STRIP.AUTO: 1.01 (ref 1–1.03)
SQUAMOUS #/AREA URNS AUTO: 7 /HPF (ref 0–6)
TSH SERPL DL<=0.005 MIU/L-ACNC: 0.9 UIU/ML (ref 0.55–4.78)
UROBILINOGEN UR QL STRIP.AUTO: 0.2 MG/DL (ref 0–2)
WBC # BLD AUTO: 7.4 10^3/UL (ref 4–10)
WBC #/AREA URNS AUTO: 3 /HPF (ref 0–3)

## 2024-01-13 LAB — CMV DNA SERPL NAA+PROBE-LOG#: (no result) {LOG_COPIES}/ML

## 2024-05-20 ENCOUNTER — HOSPITAL ENCOUNTER (INPATIENT)
Dept: HOSPITAL 53 - M ED | Age: 34
LOS: 2 days | Discharge: HOME | DRG: 638 | End: 2024-05-22
Attending: STUDENT IN AN ORGANIZED HEALTH CARE EDUCATION/TRAINING PROGRAM | Admitting: INTERNAL MEDICINE
Payer: MEDICARE

## 2024-05-20 VITALS — HEIGHT: 60 IN | WEIGHT: 100.53 LBS | BODY MASS INDEX: 19.74 KG/M2

## 2024-05-20 DIAGNOSIS — E83.42: ICD-10-CM

## 2024-05-20 DIAGNOSIS — F41.9: ICD-10-CM

## 2024-05-20 DIAGNOSIS — F12.188: ICD-10-CM

## 2024-05-20 DIAGNOSIS — E10.22: ICD-10-CM

## 2024-05-20 DIAGNOSIS — M06.9: ICD-10-CM

## 2024-05-20 DIAGNOSIS — E10.65: ICD-10-CM

## 2024-05-20 DIAGNOSIS — R11.2: ICD-10-CM

## 2024-05-20 DIAGNOSIS — D50.9: ICD-10-CM

## 2024-05-20 DIAGNOSIS — Z91.148: ICD-10-CM

## 2024-05-20 DIAGNOSIS — F32.A: ICD-10-CM

## 2024-05-20 DIAGNOSIS — D84.9: ICD-10-CM

## 2024-05-20 DIAGNOSIS — E03.9: ICD-10-CM

## 2024-05-20 DIAGNOSIS — E10.10: Primary | ICD-10-CM

## 2024-05-20 DIAGNOSIS — Z88.6: ICD-10-CM

## 2024-05-20 DIAGNOSIS — I12.9: ICD-10-CM

## 2024-05-20 DIAGNOSIS — Z94.0: ICD-10-CM

## 2024-05-20 DIAGNOSIS — N18.9: ICD-10-CM

## 2024-05-20 DIAGNOSIS — Z79.4: ICD-10-CM

## 2024-05-20 DIAGNOSIS — Z79.899: ICD-10-CM

## 2024-05-20 LAB
ALBUMIN SERPL BCG-MCNC: 3 G/DL (ref 3.2–5.2)
ALP SERPL-CCNC: 148 U/L (ref 46–116)
ALT SERPL W P-5'-P-CCNC: 16 U/L (ref 7–40)
AST SERPL-CCNC: 16 U/L (ref ?–34)
B-HCG SERPL QL: NEGATIVE
B-OH-BUTYR SERPL-MCNC: > 4.5 MMOL/L (ref 0.02–0.27)
BASE EXCESS BLDV CALC-SCNC: -22.3 MMOL/L (ref -2–2)
BASOPHILS # BLD AUTO: 0.1 10^3/UL (ref 0–0.2)
BASOPHILS NFR BLD AUTO: 0.8 % (ref 0–1)
BILIRUB CONJ SERPL-MCNC: < 0.1 MG/DL (ref ?–0.4)
BILIRUB SERPL-MCNC: 0.2 MG/DL (ref 0.3–1.2)
BUN SERPL-MCNC: 31 MG/DL (ref 9–23)
CALCIUM SERPL-MCNC: 9.2 MG/DL (ref 8.5–10.1)
CHLORIDE SERPL-SCNC: 90 MMOL/L (ref 98–107)
CO2 BLDV CALC-SCNC: 7.3 MMOL/L (ref 24–28)
CO2 SERPL-SCNC: < 10 MMOL/L (ref 20–31)
CREAT SERPL-MCNC: 1.72 MG/DL (ref 0.55–1.3)
EOSINOPHIL # BLD AUTO: 0.1 10^3/UL (ref 0–0.5)
EOSINOPHIL NFR BLD AUTO: 0.6 % (ref 0–3)
GFR SERPL CREATININE-BSD FRML MDRD: 36.4 ML/MIN/{1.73_M2} (ref 60–?)
GLUCOSE SERPL-MCNC: 580 MG/DL (ref 60–100)
HCO3 BLDV-SCNC: 6.5 MMOL/L (ref 23–27)
HCO3 STD BLDV-SCNC: 8.1 MMOL/L
HCT VFR BLD AUTO: 28.5 % (ref 36–47)
HGB BLD-MCNC: 9.3 G/DL (ref 12–15.5)
LIPASE SERPL-CCNC: 43 U/L (ref 12–53)
LYMPHOCYTES # BLD AUTO: 1.7 10^3/UL (ref 1.5–5)
LYMPHOCYTES NFR BLD AUTO: 11.7 % (ref 24–44)
MAGNESIUM SERPL-MCNC: 2.1 MG/DL (ref 1.8–2.4)
MCH RBC QN AUTO: 30.9 PG (ref 27–33)
MCHC RBC AUTO-ENTMCNC: 32.6 G/DL (ref 32–36.5)
MCV RBC AUTO: 94.7 FL (ref 80–96)
MONOCYTES # BLD AUTO: 0.8 10^3/UL (ref 0–0.8)
MONOCYTES NFR BLD AUTO: 5.8 % (ref 2–8)
NEUTROPHILS # BLD AUTO: 11.5 10^3/UL (ref 1.5–8.5)
NEUTROPHILS NFR BLD AUTO: 80.3 % (ref 36–66)
OSMOLALITY SERPL: 332 MOSM/KG (ref 275–295)
PCO2 BLDV: 24 MMHG (ref 38–50)
PH BLDV: 7.05 UNITS (ref 7.33–7.43)
PHOSPHATE SERPL-MCNC: 6.7 MG/DL (ref 2.5–4.9)
PLATELET # BLD AUTO: 557 10^3/UL (ref 150–450)
PO2 BLDV: 78 MMHG (ref 30–50)
POTASSIUM SERPL-SCNC: 4.6 MMOL/L (ref 3.5–5.1)
PROT SERPL-MCNC: 6.6 G/DL (ref 5.7–8.2)
RBC # BLD AUTO: 3.01 10^6/UL (ref 4–5.4)
SAO2 % BLDV: 91.2 % (ref 60–80)
SODIUM SERPL-SCNC: 128 MMOL/L (ref 136–145)
WBC # BLD AUTO: 14.3 10^3/UL (ref 4–10)

## 2024-05-20 RX ADMIN — INSULIN HUMAN ONE UNITS: 100 INJECTION, SOLUTION PARENTERAL at 20:25

## 2024-05-20 RX ADMIN — SODIUM CHLORIDE SCH MLS/HR: 9 INJECTION, SOLUTION INTRAVENOUS at 23:41

## 2024-05-20 RX ADMIN — SODIUM CHLORIDE ONE MLS/HR: 9 INJECTION, SOLUTION INTRAVENOUS at 23:45

## 2024-05-20 RX ADMIN — INSULIN HUMAN SCH MLS/HR: 1 INJECTION, SOLUTION INTRAVENOUS at 20:53

## 2024-05-20 RX ADMIN — SODIUM CHLORIDE ONE MLS/HR: 9 INJECTION, SOLUTION INTRAVENOUS at 19:42

## 2024-05-21 VITALS — SYSTOLIC BLOOD PRESSURE: 107 MMHG | OXYGEN SATURATION: 100 % | DIASTOLIC BLOOD PRESSURE: 70 MMHG

## 2024-05-21 VITALS — SYSTOLIC BLOOD PRESSURE: 112 MMHG | DIASTOLIC BLOOD PRESSURE: 73 MMHG | TEMPERATURE: 97.1 F | OXYGEN SATURATION: 98 %

## 2024-05-21 VITALS — TEMPERATURE: 98.1 F | SYSTOLIC BLOOD PRESSURE: 123 MMHG | DIASTOLIC BLOOD PRESSURE: 85 MMHG | OXYGEN SATURATION: 98 %

## 2024-05-21 VITALS — SYSTOLIC BLOOD PRESSURE: 112 MMHG | OXYGEN SATURATION: 98 % | DIASTOLIC BLOOD PRESSURE: 86 MMHG | TEMPERATURE: 98.8 F

## 2024-05-21 VITALS — OXYGEN SATURATION: 99 %

## 2024-05-21 VITALS — SYSTOLIC BLOOD PRESSURE: 106 MMHG | OXYGEN SATURATION: 98 % | TEMPERATURE: 98.5 F | DIASTOLIC BLOOD PRESSURE: 75 MMHG

## 2024-05-21 VITALS — OXYGEN SATURATION: 99 % | DIASTOLIC BLOOD PRESSURE: 65 MMHG | SYSTOLIC BLOOD PRESSURE: 93 MMHG

## 2024-05-21 VITALS — OXYGEN SATURATION: 94 % | DIASTOLIC BLOOD PRESSURE: 71 MMHG | TEMPERATURE: 99.2 F | SYSTOLIC BLOOD PRESSURE: 105 MMHG

## 2024-05-21 VITALS — SYSTOLIC BLOOD PRESSURE: 111 MMHG | DIASTOLIC BLOOD PRESSURE: 73 MMHG | OXYGEN SATURATION: 100 % | TEMPERATURE: 97 F

## 2024-05-21 VITALS — SYSTOLIC BLOOD PRESSURE: 115 MMHG | OXYGEN SATURATION: 100 % | DIASTOLIC BLOOD PRESSURE: 74 MMHG

## 2024-05-21 LAB
AMPHETAMINES UR QL SCN: NEGATIVE
BARBITURATES UR QL SCN: NEGATIVE
BENZODIAZ UR QL SCN: NEGATIVE
BUN SERPL-MCNC: 21 MG/DL (ref 9–23)
BUN SERPL-MCNC: 24 MG/DL (ref 9–23)
BUN SERPL-MCNC: 28 MG/DL (ref 9–23)
BUN SERPL-MCNC: 28 MG/DL (ref 9–23)
BUN SERPL-MCNC: 32 MG/DL (ref 9–23)
BUN SERPL-MCNC: 32 MG/DL (ref 9–23)
BZE UR QL SCN: NEGATIVE
CALCIUM SERPL-MCNC: 8.2 MG/DL (ref 8.5–10.1)
CALCIUM SERPL-MCNC: 8.5 MG/DL (ref 8.5–10.1)
CALCIUM SERPL-MCNC: 8.6 MG/DL (ref 8.5–10.1)
CALCIUM SERPL-MCNC: 8.8 MG/DL (ref 8.5–10.1)
CALCIUM SERPL-MCNC: 8.9 MG/DL (ref 8.5–10.1)
CALCIUM SERPL-MCNC: 9.1 MG/DL (ref 8.5–10.1)
CANNABINOIDS UR QL SCN: POSITIVE
CHLORIDE SERPL-SCNC: 100 MMOL/L (ref 98–107)
CHLORIDE SERPL-SCNC: 101 MMOL/L (ref 98–107)
CHLORIDE SERPL-SCNC: 101 MMOL/L (ref 98–107)
CHLORIDE SERPL-SCNC: 102 MMOL/L (ref 98–107)
CO2 SERPL-SCNC: 15 MMOL/L (ref 20–31)
CO2 SERPL-SCNC: 16 MMOL/L (ref 20–31)
CO2 SERPL-SCNC: 18 MMOL/L (ref 20–31)
CO2 SERPL-SCNC: 19 MMOL/L (ref 20–31)
CO2 SERPL-SCNC: 23 MMOL/L (ref 20–31)
CO2 SERPL-SCNC: 24 MMOL/L (ref 20–31)
CREAT SERPL-MCNC: 1.24 MG/DL (ref 0.55–1.3)
CREAT SERPL-MCNC: 1.38 MG/DL (ref 0.55–1.3)
CREAT SERPL-MCNC: 1.48 MG/DL (ref 0.55–1.3)
CREAT SERPL-MCNC: 1.56 MG/DL (ref 0.55–1.3)
CREAT SERPL-MCNC: 1.58 MG/DL (ref 0.55–1.3)
CREAT SERPL-MCNC: 1.69 MG/DL (ref 0.55–1.3)
GFR SERPL CREATININE-BSD FRML MDRD: 37.1 ML/MIN/{1.73_M2} (ref 60–?)
GFR SERPL CREATININE-BSD FRML MDRD: 40.1 ML/MIN/{1.73_M2} (ref 60–?)
GFR SERPL CREATININE-BSD FRML MDRD: 40.7 ML/MIN/{1.73_M2} (ref 60–?)
GFR SERPL CREATININE-BSD FRML MDRD: 43.2 ML/MIN/{1.73_M2} (ref 60–?)
GFR SERPL CREATININE-BSD FRML MDRD: 46.9 ML/MIN/{1.73_M2} (ref 60–?)
GFR SERPL CREATININE-BSD FRML MDRD: 53 ML/MIN/{1.73_M2} (ref 60–?)
GLUCOSE SERPL-MCNC: 131 MG/DL (ref 60–100)
GLUCOSE SERPL-MCNC: 133 MG/DL (ref 60–100)
GLUCOSE SERPL-MCNC: 147 MG/DL (ref 60–100)
GLUCOSE SERPL-MCNC: 156 MG/DL (ref 60–100)
GLUCOSE SERPL-MCNC: 214 MG/DL (ref 60–100)
GLUCOSE SERPL-MCNC: 96 MG/DL (ref 60–100)
METHADONE UR QL SCN: NEGATIVE
OPIATES UR QL SCN: NEGATIVE
PCP UR QL SCN: NEGATIVE
PHOSPHATE SERPL-MCNC: 1.8 MG/DL (ref 2.5–4.9)
PHOSPHATE SERPL-MCNC: 2.3 MG/DL (ref 2.5–4.9)
PHOSPHATE SERPL-MCNC: 2.6 MG/DL (ref 2.5–4.9)
POTASSIUM SERPL-SCNC: 3.3 MMOL/L (ref 3.5–5.1)
POTASSIUM SERPL-SCNC: 3.4 MMOL/L (ref 3.5–5.1)
POTASSIUM SERPL-SCNC: 3.8 MMOL/L (ref 3.5–5.1)
POTASSIUM SERPL-SCNC: 3.8 MMOL/L (ref 3.5–5.1)
POTASSIUM SERPL-SCNC: 3.9 MMOL/L (ref 3.5–5.1)
POTASSIUM SERPL-SCNC: 4.3 MMOL/L (ref 3.5–5.1)
SODIUM SERPL-SCNC: 132 MMOL/L (ref 136–145)
SODIUM SERPL-SCNC: 134 MMOL/L (ref 136–145)
SODIUM SERPL-SCNC: 135 MMOL/L (ref 136–145)
SODIUM SERPL-SCNC: 137 MMOL/L (ref 136–145)

## 2024-05-21 RX ADMIN — SODIUM CHLORIDE SCH UNITS: 4.5 INJECTION, SOLUTION INTRAVENOUS at 05:47

## 2024-05-21 RX ADMIN — CALCIUM CARBONATE (ANTACID) CHEW TAB 500 MG PRN MG: 500 CHEW TAB at 12:23

## 2024-05-21 RX ADMIN — DEXTROSE MONOHYDRATE ONE MLS/HR: 50 INJECTION, SOLUTION INTRAVENOUS at 15:27

## 2024-05-21 RX ADMIN — SIROLIMUS SCH MG: 1 TABLET ORAL at 01:25

## 2024-05-21 RX ADMIN — DEXTROSE MONOHYDRATE SCH MG: 50 INJECTION, SOLUTION INTRAVENOUS at 01:42

## 2024-05-21 RX ADMIN — INSULIN LISPRO SCH UNITS: 100 INJECTION, SOLUTION INTRAVENOUS; SUBCUTANEOUS at 17:44

## 2024-05-21 RX ADMIN — POTASSIUM CHLORIDE ONE MEQ: 750 TABLET, EXTENDED RELEASE ORAL at 13:29

## 2024-05-21 RX ADMIN — INSULIN DETEMIR SCH UNITS: 100 INJECTION, SOLUTION SUBCUTANEOUS at 13:29

## 2024-05-21 RX ADMIN — POTASSIUM CHLORIDE SCH MLS/HR: 7.46 INJECTION, SOLUTION INTRAVENOUS at 13:28

## 2024-05-21 RX ADMIN — TACROLIMUS SCH MG: 1 CAPSULE ORAL at 01:24

## 2024-05-21 RX ADMIN — POTASSIUM CHLORIDE ONE MEQ: 750 TABLET, EXTENDED RELEASE ORAL at 17:45

## 2024-05-21 RX ADMIN — ONDANSETRON PRN MG: 2 INJECTION INTRAMUSCULAR; INTRAVENOUS at 00:36

## 2024-05-21 RX ADMIN — POTASSIUM CHLORIDE SCH MLS/HR: 7.46 INJECTION, SOLUTION INTRAVENOUS at 03:18

## 2024-05-21 RX ADMIN — Medication SCH: at 01:44

## 2024-05-21 RX ADMIN — DEXTROSE AND SODIUM CHLORIDE SCH MLS/HR: 5; 450 INJECTION, SOLUTION INTRAVENOUS at 01:47

## 2024-05-21 RX ADMIN — OMEPRAZOLE SCH MG: 20 CAPSULE, DELAYED RELEASE ORAL at 20:06

## 2024-05-21 RX ADMIN — ACETAMINOPHEN PRN MG: 325 TABLET ORAL at 22:00

## 2024-05-21 RX ADMIN — DEXTROSE MONOHYDRATE ONE MLS/HR: 50 INJECTION, SOLUTION INTRAVENOUS at 21:01

## 2024-05-21 RX ADMIN — INSULIN HUMAN SCH MLS/HR: 1 INJECTION, SOLUTION INTRAVENOUS at 06:04

## 2024-05-21 RX ADMIN — INSULIN LISPRO SCH UNITS: 100 INJECTION, SOLUTION INTRAVENOUS; SUBCUTANEOUS at 19:52

## 2024-05-21 RX ADMIN — DULOXETINE HYDROCHLORIDE SCH MG: 30 CAPSULE, DELAYED RELEASE ORAL at 01:24

## 2024-05-22 VITALS — OXYGEN SATURATION: 98 % | TEMPERATURE: 97.9 F | DIASTOLIC BLOOD PRESSURE: 93 MMHG | SYSTOLIC BLOOD PRESSURE: 129 MMHG

## 2024-05-22 VITALS — DIASTOLIC BLOOD PRESSURE: 99 MMHG | TEMPERATURE: 98.3 F | OXYGEN SATURATION: 100 % | SYSTOLIC BLOOD PRESSURE: 132 MMHG

## 2024-05-22 VITALS — OXYGEN SATURATION: 99 % | TEMPERATURE: 97.6 F | SYSTOLIC BLOOD PRESSURE: 155 MMHG | DIASTOLIC BLOOD PRESSURE: 114 MMHG

## 2024-05-22 VITALS — DIASTOLIC BLOOD PRESSURE: 105 MMHG | SYSTOLIC BLOOD PRESSURE: 139 MMHG | OXYGEN SATURATION: 99 % | TEMPERATURE: 98.9 F

## 2024-05-22 VITALS — SYSTOLIC BLOOD PRESSURE: 139 MMHG | OXYGEN SATURATION: 100 % | DIASTOLIC BLOOD PRESSURE: 97 MMHG

## 2024-05-22 VITALS — SYSTOLIC BLOOD PRESSURE: 145 MMHG | DIASTOLIC BLOOD PRESSURE: 97 MMHG | OXYGEN SATURATION: 99 % | TEMPERATURE: 98 F

## 2024-05-22 VITALS — OXYGEN SATURATION: 98 %

## 2024-05-22 LAB
ALBUMIN SERPL BCG-MCNC: 2.6 G/DL (ref 3.2–5.2)
ALP SERPL-CCNC: 119 U/L (ref 46–116)
ALT SERPL W P-5'-P-CCNC: 13 U/L (ref 7–40)
AST SERPL-CCNC: 16 U/L (ref ?–34)
BASOPHILS # BLD AUTO: 0 10^3/UL (ref 0–0.2)
BASOPHILS NFR BLD AUTO: 0.3 % (ref 0–1)
BILIRUB SERPL-MCNC: 0.2 MG/DL (ref 0.3–1.2)
BUN SERPL-MCNC: 15 MG/DL (ref 9–23)
CALCIUM SERPL-MCNC: 8.8 MG/DL (ref 8.5–10.1)
CHLORIDE SERPL-SCNC: 103 MMOL/L (ref 98–107)
CO2 SERPL-SCNC: 23 MMOL/L (ref 20–31)
CREAT SERPL-MCNC: 1.07 MG/DL (ref 0.55–1.3)
EOSINOPHIL # BLD AUTO: 0.2 10^3/UL (ref 0–0.5)
EOSINOPHIL NFR BLD AUTO: 2.4 % (ref 0–3)
GFR SERPL CREATININE-BSD FRML MDRD: > 60 ML/MIN/{1.73_M2} (ref 60–?)
GLUCOSE SERPL-MCNC: 125 MG/DL (ref 60–100)
HCT VFR BLD AUTO: 26.4 % (ref 36–47)
HGB BLD-MCNC: 8.8 G/DL (ref 12–15.5)
LYMPHOCYTES # BLD AUTO: 1.7 10^3/UL (ref 1.5–5)
LYMPHOCYTES NFR BLD AUTO: 18.8 % (ref 24–44)
MAGNESIUM SERPL-MCNC: 1.7 MG/DL (ref 1.8–2.4)
MCH RBC QN AUTO: 30.6 PG (ref 27–33)
MCHC RBC AUTO-ENTMCNC: 33.3 G/DL (ref 32–36.5)
MCV RBC AUTO: 91.7 FL (ref 80–96)
MONOCYTES # BLD AUTO: 0.8 10^3/UL (ref 0–0.8)
MONOCYTES NFR BLD AUTO: 8.7 % (ref 2–8)
NEUTROPHILS # BLD AUTO: 6.4 10^3/UL (ref 1.5–8.5)
NEUTROPHILS NFR BLD AUTO: 69.6 % (ref 36–66)
PHOSPHATE SERPL-MCNC: 5.2 MG/DL (ref 2.5–4.9)
PLATELET # BLD AUTO: 440 10^3/UL (ref 150–450)
POTASSIUM SERPL-SCNC: 4.3 MMOL/L (ref 3.5–5.1)
PROT SERPL-MCNC: 5.8 G/DL (ref 5.7–8.2)
RBC # BLD AUTO: 2.88 10^6/UL (ref 4–5.4)
SODIUM SERPL-SCNC: 134 MMOL/L (ref 136–145)
WBC # BLD AUTO: 9.3 10^3/UL (ref 4–10)

## 2024-05-22 RX ADMIN — MAGNESIUM SULFATE IN DEXTROSE ONE MLS/HR: 10 INJECTION, SOLUTION INTRAVENOUS at 08:18

## 2024-05-23 ENCOUNTER — HOSPITAL ENCOUNTER (OUTPATIENT)
Dept: HOSPITAL 53 - M PLALAB | Age: 34
End: 2024-05-23
Attending: PHYSICIAN ASSISTANT
Payer: MEDICARE

## 2024-05-23 ENCOUNTER — HOSPITAL ENCOUNTER (OUTPATIENT)
Dept: HOSPITAL 53 - M PLALAB | Age: 34
End: 2024-05-23
Attending: FAMILY MEDICINE
Payer: MEDICARE

## 2024-05-23 DIAGNOSIS — E10.65: Primary | ICD-10-CM

## 2024-05-23 DIAGNOSIS — D64.9: Primary | ICD-10-CM

## 2024-05-23 LAB
FERRITIN SERPL-MCNC: 13.7 NG/ML (ref 7.3–270.7)
HCT VFR BLD AUTO: 25.7 % (ref 36–47)
HGB BLD-MCNC: 8.6 G/DL (ref 12–15.5)
IRON SATN MFR SERPL: 8.8 % (ref 13.2–45)
IRON SERPL-MCNC: 33 UG/DL (ref 50–170)
MCH RBC QN AUTO: 30.8 PG (ref 27–33)
MCHC RBC AUTO-ENTMCNC: 33.5 G/DL (ref 32–36.5)
MCV RBC AUTO: 92.1 FL (ref 80–96)
PLATELET # BLD AUTO: 463 10^3/UL (ref 150–450)
RBC # BLD AUTO: 2.79 10^6/UL (ref 4–5.4)
TIBC SERPL-MCNC: 375 UG/DL (ref 250–425)
WBC # BLD AUTO: 6.6 10^3/UL (ref 4–10)

## 2024-05-24 ENCOUNTER — HOSPITAL ENCOUNTER (OUTPATIENT)
Dept: HOSPITAL 53 - M SFHCRHEU | Age: 34
End: 2024-05-24
Attending: INTERNAL MEDICINE
Payer: MEDICARE

## 2024-05-24 DIAGNOSIS — R76.8: ICD-10-CM

## 2024-05-24 DIAGNOSIS — Z11.59: ICD-10-CM

## 2024-05-24 DIAGNOSIS — Z79.52: Primary | ICD-10-CM

## 2024-05-24 DIAGNOSIS — M05.79: ICD-10-CM

## 2024-07-22 ENCOUNTER — HOSPITAL ENCOUNTER (OUTPATIENT)
Dept: HOSPITAL 53 - M LAB | Age: 34
End: 2024-07-22
Attending: NURSE PRACTITIONER
Payer: MEDICARE

## 2024-07-22 DIAGNOSIS — Z79.899: ICD-10-CM

## 2024-07-22 DIAGNOSIS — D84.9: ICD-10-CM

## 2024-07-22 DIAGNOSIS — Z94.0: Primary | ICD-10-CM

## 2024-07-22 LAB
ALBUMIN SERPL BCG-MCNC: 3.6 G/DL (ref 3.2–5.2)
ALP SERPL-CCNC: 100 U/L (ref 46–116)
ALT SERPL W P-5'-P-CCNC: 17 U/L (ref 7–40)
AMORPH SED URNS QL MICRO: (no result)
APPEARANCE UR: (no result)
AST SERPL-CCNC: 11 U/L (ref ?–34)
BACTERIA UR QL AUTO: NEGATIVE
BASOPHILS # BLD AUTO: 0.1 10^3/UL (ref 0–0.2)
BASOPHILS NFR BLD AUTO: 1.1 % (ref 0–1)
BILIRUB SERPL-MCNC: 0.2 MG/DL (ref 0.3–1.2)
BILIRUB UR QL STRIP.AUTO: NEGATIVE
BUN SERPL-MCNC: 36 MG/DL (ref 9–23)
CALCIUM SERPL-MCNC: 10.3 MG/DL (ref 8.5–10.1)
CHLORIDE SERPL-SCNC: 95 MMOL/L (ref 98–107)
CHOLEST SERPL-MCNC: 313 MG/DL (ref ?–200)
CHOLEST/HDLC SERPL: 3.94 {RATIO} (ref ?–5)
CO2 SERPL-SCNC: 36 MMOL/L (ref 20–31)
CREAT SERPL-MCNC: 2.75 MG/DL (ref 0.55–1.3)
CREAT UR-MCNC: 100.3 MG/DL
EOSINOPHIL # BLD AUTO: 0.4 10^3/UL (ref 0–0.5)
EOSINOPHIL NFR BLD AUTO: 4.6 % (ref 0–3)
GFR SERPL CREATININE-BSD FRML MDRD: 21.2 ML/MIN/{1.73_M2} (ref 60–?)
GLUCOSE SERPL-MCNC: 189 MG/DL (ref 60–100)
GLUCOSE UR QL STRIP.AUTO: (no result) MG/DL
HCT VFR BLD AUTO: 28.8 % (ref 36–47)
HDLC SERPL-MCNC: 79.3 MG/DL (ref 40–?)
HGB BLD-MCNC: 9.2 G/DL (ref 12–15.5)
HGB UR QL STRIP.AUTO: NEGATIVE
KETONES UR QL STRIP.AUTO: (no result) MG/DL
LDLC SERPL CALC-MCNC: 195.7 MG/DL (ref ?–100)
LEUKOCYTE ESTERASE UR QL STRIP.AUTO: (no result)
LYMPHOCYTES # BLD AUTO: 2.5 10^3/UL (ref 1.5–5)
LYMPHOCYTES NFR BLD AUTO: 30 % (ref 24–44)
MAGNESIUM SERPL-MCNC: 2.6 MG/DL (ref 1.8–2.4)
MCH RBC QN AUTO: 29.5 PG (ref 27–33)
MCHC RBC AUTO-ENTMCNC: 31.9 G/DL (ref 32–36.5)
MCV RBC AUTO: 92.3 FL (ref 80–96)
MONOCYTES # BLD AUTO: 0.6 10^3/UL (ref 0–0.8)
MONOCYTES NFR BLD AUTO: 7.6 % (ref 2–8)
MUCOUS THREADS URNS QL MICRO: (no result)
NEUTROPHILS # BLD AUTO: 4.7 10^3/UL (ref 1.5–8.5)
NEUTROPHILS NFR BLD AUTO: 56.5 % (ref 36–66)
NITRITE UR QL STRIP.AUTO: NEGATIVE
NONHDLC SERPL-MCNC: 233.7 MG/DL
PH UR STRIP.AUTO: 6 UNITS (ref 5–9)
PLATELET # BLD AUTO: 441 10^3/UL (ref 150–450)
POTASSIUM SERPL-SCNC: 4.7 MMOL/L (ref 3.5–5.1)
PROT SERPL-MCNC: 6.8 G/DL (ref 5.7–8.2)
PROT UR QL STRIP.AUTO: (no result) MG/DL
PROT UR-MCNC: 50.4 MG/DL (ref 0–14)
RBC # BLD AUTO: 3.12 10^6/UL (ref 4–5.4)
RBC # UR AUTO: 2 /HPF (ref 0–3)
SODIUM SERPL-SCNC: 136 MMOL/L (ref 136–145)
SP GR UR STRIP.AUTO: 1.01 (ref 1–1.03)
SQUAMOUS #/AREA URNS AUTO: 1 /HPF (ref 0–6)
TRIGL SERPL-MCNC: 190 MG/DL (ref ?–150)
UROBILINOGEN UR QL STRIP.AUTO: 0.2 MG/DL (ref 0–2)
WBC # BLD AUTO: 8.3 10^3/UL (ref 4–10)
WBC #/AREA URNS AUTO: 14 /HPF (ref 0–3)

## 2024-07-24 LAB — SIROLIMUS BLD-MCNC: (no result) NG/ML

## 2024-08-11 ENCOUNTER — HOSPITAL ENCOUNTER (INPATIENT)
Dept: HOSPITAL 53 - M ED | Age: 34
LOS: 1 days | Discharge: HOME | DRG: 637 | End: 2024-08-12
Attending: STUDENT IN AN ORGANIZED HEALTH CARE EDUCATION/TRAINING PROGRAM | Admitting: STUDENT IN AN ORGANIZED HEALTH CARE EDUCATION/TRAINING PROGRAM
Payer: MEDICARE

## 2024-08-11 VITALS — DIASTOLIC BLOOD PRESSURE: 98 MMHG | TEMPERATURE: 97.5 F | SYSTOLIC BLOOD PRESSURE: 137 MMHG | OXYGEN SATURATION: 97 %

## 2024-08-11 VITALS — WEIGHT: 110.01 LBS | BODY MASS INDEX: 20.24 KG/M2 | HEIGHT: 62 IN

## 2024-08-11 VITALS — SYSTOLIC BLOOD PRESSURE: 169 MMHG | DIASTOLIC BLOOD PRESSURE: 117 MMHG

## 2024-08-11 VITALS — SYSTOLIC BLOOD PRESSURE: 124 MMHG | DIASTOLIC BLOOD PRESSURE: 79 MMHG

## 2024-08-11 DIAGNOSIS — Z94.0: ICD-10-CM

## 2024-08-11 DIAGNOSIS — D84.9: ICD-10-CM

## 2024-08-11 DIAGNOSIS — F32.A: ICD-10-CM

## 2024-08-11 DIAGNOSIS — E10.22: ICD-10-CM

## 2024-08-11 DIAGNOSIS — I12.9: ICD-10-CM

## 2024-08-11 DIAGNOSIS — F41.9: ICD-10-CM

## 2024-08-11 DIAGNOSIS — Z79.899: ICD-10-CM

## 2024-08-11 DIAGNOSIS — E10.649: Primary | ICD-10-CM

## 2024-08-11 DIAGNOSIS — Z79.52: ICD-10-CM

## 2024-08-11 DIAGNOSIS — Z88.8: ICD-10-CM

## 2024-08-11 DIAGNOSIS — N18.9: ICD-10-CM

## 2024-08-11 DIAGNOSIS — Z79.4: ICD-10-CM

## 2024-08-11 DIAGNOSIS — M06.9: ICD-10-CM

## 2024-08-11 DIAGNOSIS — D50.9: ICD-10-CM

## 2024-08-11 DIAGNOSIS — G93.41: ICD-10-CM

## 2024-08-11 DIAGNOSIS — A04.72: ICD-10-CM

## 2024-08-11 DIAGNOSIS — E27.40: ICD-10-CM

## 2024-08-11 LAB
ALBUMIN SERPL BCG-MCNC: 3.7 G/DL (ref 3.2–5.2)
ALP SERPL-CCNC: 88 U/L (ref 46–116)
ALT SERPL W P-5'-P-CCNC: 22 U/L (ref 7–40)
AMPHETAMINES UR QL SCN: NEGATIVE
AST SERPL-CCNC: 28 U/L (ref ?–34)
B-HCG SERPL QL: NEGATIVE
BARBITURATES UR QL SCN: NEGATIVE
BASE EXCESS BLDA CALC-SCNC: 1.4 MMOL/L (ref -2–2)
BASOPHILS # BLD AUTO: 0.1 10^3/UL (ref 0–0.2)
BASOPHILS NFR BLD AUTO: 1.2 % (ref 0–1)
BENZODIAZ UR QL SCN: NEGATIVE
BILIRUB CONJ SERPL-MCNC: < 0.1 MG/DL (ref ?–0.4)
BILIRUB SERPL-MCNC: 0.2 MG/DL (ref 0.3–1.2)
BUN SERPL-MCNC: 29 MG/DL (ref 9–23)
BUN SERPL-MCNC: 31 MG/DL (ref 9–23)
BZE UR QL SCN: NEGATIVE
CALCIUM SERPL-MCNC: 10.8 MG/DL (ref 8.5–10.1)
CALCIUM SERPL-MCNC: 8.6 MG/DL (ref 8.5–10.1)
CANNABINOIDS UR QL SCN: POSITIVE
CHLORIDE SERPL-SCNC: 101 MMOL/L (ref 98–107)
CHLORIDE SERPL-SCNC: 103 MMOL/L (ref 98–107)
CK MB CFR.DF SERPL CALC: 1.29
CK MB SERPL-MCNC: < 1 NG/ML (ref ?–3.6)
CK SERPL-CCNC: 77 U/L (ref 34–145)
CO2 BLDA CALC-SCNC: 29.2 MMOL/L (ref 22–29)
CO2 SERPL-SCNC: 26 MMOL/L (ref 20–31)
CO2 SERPL-SCNC: 29 MMOL/L (ref 20–31)
CREAT SERPL-MCNC: 1.28 MG/DL (ref 0.55–1.3)
CREAT SERPL-MCNC: 1.55 MG/DL (ref 0.55–1.3)
EOSINOPHIL # BLD AUTO: 0.8 10^3/UL (ref 0–0.5)
EOSINOPHIL NFR BLD AUTO: 8.2 % (ref 0–3)
ETHANOL SERPL-MCNC: < 0.003 % (ref 0–0.01)
GFR SERPL CREATININE-BSD FRML MDRD: 41 ML/MIN/{1.73_M2} (ref 60–?)
GFR SERPL CREATININE-BSD FRML MDRD: 51.1 ML/MIN/{1.73_M2} (ref 60–?)
GLUCOSE SERPL-MCNC: 287 MG/DL (ref 60–100)
GLUCOSE SERPL-MCNC: 655 MG/DL (ref ?–200)
HCO3 BLDA-SCNC: 27.6 MMOL/L (ref 22–26)
HCO3 STD BLDA-SCNC: 25.7 MMOL/L. (ref 22–26)
HCT VFR BLD AUTO: 30.4 % (ref 36–47)
HGB BLD-MCNC: 9.5 G/DL (ref 12–15.5)
LYMPHOCYTES # BLD AUTO: 2.5 10^3/UL (ref 1.5–5)
LYMPHOCYTES NFR BLD AUTO: 25.4 % (ref 24–44)
MCH RBC QN AUTO: 30.1 PG (ref 27–33)
MCHC RBC AUTO-ENTMCNC: 31.3 G/DL (ref 32–36.5)
MCV RBC AUTO: 96.2 FL (ref 80–96)
METHADONE UR QL SCN: NEGATIVE
MONOCYTES # BLD AUTO: 0.9 10^3/UL (ref 0–0.8)
MONOCYTES NFR BLD AUTO: 9.3 % (ref 2–8)
NEUTROPHILS # BLD AUTO: 5.4 10^3/UL (ref 1.5–8.5)
NEUTROPHILS NFR BLD AUTO: 55.6 % (ref 36–66)
OPIATES UR QL SCN: NEGATIVE
PCO2 BLDA: 52.4 MMHG (ref 35–45)
PCP UR QL SCN: NEGATIVE
PH BLDA: 7.34 UNITS (ref 7.35–7.45)
PLATELET # BLD AUTO: 498 10^3/UL (ref 150–450)
PO2 BLDA: 80 MMHG (ref 75–100)
POTASSIUM SERPL-SCNC: 3.9 MMOL/L (ref 3.5–5.1)
POTASSIUM SERPL-SCNC: 4.9 MMOL/L (ref 3.5–5.1)
PROT SERPL-MCNC: 7.2 G/DL (ref 5.7–8.2)
RBC # BLD AUTO: 3.16 10^6/UL (ref 4–5.4)
SALICYLATES SERPL-MCNC: < 3 MG/DL (ref ?–30)
SAO2 % BLDA: 94.8 % (ref 95–99)
SODIUM SERPL-SCNC: 131 MMOL/L (ref 136–145)
SODIUM SERPL-SCNC: 142 MMOL/L (ref 136–145)
TSH SERPL DL<=0.005 MIU/L-ACNC: 0.93 UIU/ML (ref 0.55–4.78)
WBC # BLD AUTO: 9.8 10^3/UL (ref 4–10)

## 2024-08-11 RX ADMIN — FIDAXOMICIN SCH MG: 200 TABLET, FILM COATED ORAL at 21:52

## 2024-08-11 RX ADMIN — TACROLIMUS SCH MG: 1 CAPSULE ORAL at 21:53

## 2024-08-11 RX ADMIN — INSULIN LISPRO STA UNITS: 100 INJECTION, SOLUTION INTRAVENOUS; SUBCUTANEOUS at 23:32

## 2024-08-11 RX ADMIN — INSULIN LISPRO SCH UNITS: 100 INJECTION, SOLUTION INTRAVENOUS; SUBCUTANEOUS at 19:52

## 2024-08-11 RX ADMIN — INSULIN DETEMIR SCH UNITS: 100 INJECTION, SOLUTION SUBCUTANEOUS at 21:53

## 2024-08-11 RX ADMIN — OMEPRAZOLE SCH MG: 20 CAPSULE, DELAYED RELEASE ORAL at 21:53

## 2024-08-11 RX ADMIN — SIROLIMUS SCH MG: 1 TABLET ORAL at 21:52

## 2024-08-11 RX ADMIN — SODIUM CHLORIDE ONE MLS/HR: 9 INJECTION, SOLUTION INTRAVENOUS at 16:08

## 2024-08-11 RX ADMIN — DEXTROSE MONOHYDRATE ONE MLS/HR: 5 INJECTION INTRAVENOUS at 15:51

## 2024-08-11 RX ADMIN — SODIUM CHLORIDE, POTASSIUM CHLORIDE, SODIUM LACTATE AND CALCIUM CHLORIDE SCH MLS/HR: 600; 310; 30; 20 INJECTION, SOLUTION INTRAVENOUS at 19:50

## 2024-08-11 RX ADMIN — HYDROCORTISONE SODIUM SUCCINATE ONE MG: 100 INJECTION, POWDER, FOR SOLUTION INTRAMUSCULAR; INTRAVENOUS at 19:50

## 2024-08-11 RX ADMIN — SODIUM CHLORIDE ONE MLS/HR: 9 INJECTION, SOLUTION INTRAVENOUS at 16:00

## 2024-08-12 VITALS — SYSTOLIC BLOOD PRESSURE: 158 MMHG | OXYGEN SATURATION: 98 % | DIASTOLIC BLOOD PRESSURE: 100 MMHG

## 2024-08-12 VITALS — OXYGEN SATURATION: 98 % | DIASTOLIC BLOOD PRESSURE: 116 MMHG | TEMPERATURE: 97.2 F | SYSTOLIC BLOOD PRESSURE: 167 MMHG

## 2024-08-12 VITALS — SYSTOLIC BLOOD PRESSURE: 139 MMHG | DIASTOLIC BLOOD PRESSURE: 95 MMHG

## 2024-08-12 VITALS — SYSTOLIC BLOOD PRESSURE: 157 MMHG | DIASTOLIC BLOOD PRESSURE: 97 MMHG

## 2024-08-12 VITALS — TEMPERATURE: 97.5 F | SYSTOLIC BLOOD PRESSURE: 152 MMHG | OXYGEN SATURATION: 96 % | DIASTOLIC BLOOD PRESSURE: 92 MMHG

## 2024-08-12 VITALS — SYSTOLIC BLOOD PRESSURE: 157 MMHG | DIASTOLIC BLOOD PRESSURE: 103 MMHG

## 2024-08-12 VITALS — DIASTOLIC BLOOD PRESSURE: 116 MMHG | SYSTOLIC BLOOD PRESSURE: 167 MMHG

## 2024-08-12 VITALS — DIASTOLIC BLOOD PRESSURE: 90 MMHG | TEMPERATURE: 97.6 F | SYSTOLIC BLOOD PRESSURE: 156 MMHG | OXYGEN SATURATION: 98 %

## 2024-08-12 VITALS — SYSTOLIC BLOOD PRESSURE: 159 MMHG | DIASTOLIC BLOOD PRESSURE: 96 MMHG

## 2024-08-12 VITALS — SYSTOLIC BLOOD PRESSURE: 135 MMHG | DIASTOLIC BLOOD PRESSURE: 95 MMHG

## 2024-08-12 VITALS — OXYGEN SATURATION: 99 % | SYSTOLIC BLOOD PRESSURE: 142 MMHG | TEMPERATURE: 98.6 F | DIASTOLIC BLOOD PRESSURE: 97 MMHG

## 2024-08-12 LAB
ALBUMIN SERPL BCG-MCNC: 2.9 G/DL (ref 3.2–5.2)
ALP SERPL-CCNC: 80 U/L (ref 46–116)
ALT SERPL W P-5'-P-CCNC: 16 U/L (ref 7–40)
AST SERPL-CCNC: 12 U/L (ref ?–34)
BASOPHILS # BLD AUTO: 0 10^3/UL (ref 0–0.2)
BASOPHILS NFR BLD AUTO: 0.5 % (ref 0–1)
BILIRUB SERPL-MCNC: 0.2 MG/DL (ref 0.3–1.2)
BUN SERPL-MCNC: 22 MG/DL (ref 9–23)
CALCIUM SERPL-MCNC: 9.9 MG/DL (ref 8.5–10.1)
CHLORIDE SERPL-SCNC: 105 MMOL/L (ref 98–107)
CO2 SERPL-SCNC: 29 MMOL/L (ref 20–31)
CREAT SERPL-MCNC: 1.18 MG/DL (ref 0.55–1.3)
EOSINOPHIL # BLD AUTO: 0.2 10^3/UL (ref 0–0.5)
EOSINOPHIL NFR BLD AUTO: 2.2 % (ref 0–3)
GFR SERPL CREATININE-BSD FRML MDRD: 56.2 ML/MIN/{1.73_M2} (ref 60–?)
GLUCOSE SERPL-MCNC: (no result) MG/DL (ref 60–100)
GLUCOSE SERPL-MCNC: 219 MG/DL (ref 60–100)
HCT VFR BLD AUTO: 26.1 % (ref 36–47)
HGB BLD-MCNC: 8.3 G/DL (ref 12–15.5)
LYMPHOCYTES # BLD AUTO: 1.6 10^3/UL (ref 1.5–5)
LYMPHOCYTES NFR BLD AUTO: 18.9 % (ref 24–44)
MAGNESIUM SERPL-MCNC: 2.1 MG/DL (ref 1.8–2.4)
MCH RBC QN AUTO: 30 PG (ref 27–33)
MCHC RBC AUTO-ENTMCNC: 31.8 G/DL (ref 32–36.5)
MCV RBC AUTO: 94.2 FL (ref 80–96)
MONOCYTES # BLD AUTO: 0.8 10^3/UL (ref 0–0.8)
MONOCYTES NFR BLD AUTO: 9.6 % (ref 2–8)
NEUTROPHILS # BLD AUTO: 5.8 10^3/UL (ref 1.5–8.5)
NEUTROPHILS NFR BLD AUTO: 68.6 % (ref 36–66)
PHOSPHATE SERPL-MCNC: 3.7 MG/DL (ref 2.5–4.9)
PLATELET # BLD AUTO: 414 10^3/UL (ref 150–450)
POTASSIUM SERPL-SCNC: 4.5 MMOL/L (ref 3.5–5.1)
PROT SERPL-MCNC: 5.8 G/DL (ref 5.7–8.2)
RBC # BLD AUTO: 2.77 10^6/UL (ref 4–5.4)
SODIUM SERPL-SCNC: 137 MMOL/L (ref 136–145)
WBC # BLD AUTO: 8.5 10^3/UL (ref 4–10)

## 2024-08-12 RX ADMIN — DULOXETINE HYDROCHLORIDE SCH MG: 30 CAPSULE, DELAYED RELEASE ORAL at 08:42

## 2024-08-12 RX ADMIN — INSULIN LISPRO SCH UNITS: 100 INJECTION, SOLUTION INTRAVENOUS; SUBCUTANEOUS at 08:36

## 2024-08-12 RX ADMIN — LABETALOL HYDROCHLORIDE ONE MG: 5 INJECTION INTRAVENOUS at 00:26

## 2024-08-12 RX ADMIN — SODIUM CHLORIDE SCH UNITS: 4.5 INJECTION, SOLUTION INTRAVENOUS at 05:20

## 2024-10-31 ENCOUNTER — HOSPITAL ENCOUNTER (OUTPATIENT)
Dept: HOSPITAL 53 - M LAB | Age: 34
End: 2024-10-31
Attending: FAMILY MEDICINE
Payer: MEDICARE

## 2024-10-31 DIAGNOSIS — E10.65: Primary | ICD-10-CM

## 2024-10-31 DIAGNOSIS — F50.20: ICD-10-CM

## 2024-10-31 LAB
ALBUMIN SERPL BCG-MCNC: 3.2 G/DL (ref 3.2–5.2)
ALP SERPL-CCNC: 101 U/L (ref 35–104)
ALT SERPL W P-5'-P-CCNC: 35 U/L (ref 7–40)
AST SERPL-CCNC: 18 U/L (ref ?–34)
B-HCG SERPL QL: NEGATIVE
BILIRUB SERPL-MCNC: 0.3 MG/DL (ref 0.3–1.2)
BUN SERPL-MCNC: 31 MG/DL (ref 9–23)
CALCIUM SERPL-MCNC: 10.6 MG/DL (ref 8.5–10.1)
CHLORIDE SERPL-SCNC: 97 MMOL/L (ref 98–107)
CO2 SERPL-SCNC: 36 MMOL/L (ref 20–31)
CREAT SERPL-MCNC: 2.01 MG/DL (ref 0.55–1.3)
GFR SERPL CREATININE-BSD FRML MDRD: 30.4 ML/MIN/{1.73_M2} (ref 60–?)
GLUCOSE SERPL-MCNC: 214 MG/DL (ref 60–100)
HCT VFR BLD AUTO: 25.1 % (ref 36–47)
HGB BLD-MCNC: 8 G/DL (ref 12–15.5)
MAGNESIUM SERPL-MCNC: 2.4 MG/DL (ref 1.8–2.4)
MCH RBC QN AUTO: 29.6 PG (ref 27–33)
MCHC RBC AUTO-ENTMCNC: 31.9 G/DL (ref 32–36.5)
MCV RBC AUTO: 93 FL (ref 80–96)
PHOSPHATE SERPL-MCNC: 4.1 MG/DL (ref 2.5–4.9)
PLATELET # BLD AUTO: 449 10^3/UL (ref 150–450)
POTASSIUM SERPL-SCNC: 4.5 MMOL/L (ref 3.5–5.1)
PROT SERPL-MCNC: 6.5 G/DL (ref 5.7–8.2)
RBC # BLD AUTO: 2.7 10^6/UL (ref 4–5.4)
SODIUM SERPL-SCNC: 135 MMOL/L (ref 136–145)
WBC # BLD AUTO: 7.1 10^3/UL (ref 4–10)

## 2024-11-01 LAB — MEV IGG SER IA-ACNC: < 13.5 AU/ML (ref 16.49–?)

## 2024-11-21 ENCOUNTER — HOSPITAL ENCOUNTER (OUTPATIENT)
Dept: HOSPITAL 53 - M PLALAB | Age: 34
End: 2024-11-21
Attending: FAMILY MEDICINE
Payer: MEDICARE

## 2024-11-21 DIAGNOSIS — F50.20: ICD-10-CM

## 2024-11-21 DIAGNOSIS — E10.65: Primary | ICD-10-CM

## 2024-11-21 LAB
ALBUMIN SERPL BCG-MCNC: 3.4 G/DL (ref 3.2–5.2)
ALP SERPL-CCNC: 121 U/L (ref 35–104)
ALT SERPL W P-5'-P-CCNC: 26 U/L (ref 7–40)
AST SERPL-CCNC: 12 U/L (ref ?–34)
B-HCG SERPL QL: NEGATIVE
BILIRUB SERPL-MCNC: 0.2 MG/DL (ref 0.3–1.2)
BUN SERPL-MCNC: 23 MG/DL (ref 9–23)
CALCIUM SERPL-MCNC: 10.6 MG/DL (ref 8.5–10.1)
CHLORIDE SERPL-SCNC: 94 MMOL/L (ref 98–107)
CO2 SERPL-SCNC: 34 MMOL/L (ref 20–31)
CREAT SERPL-MCNC: 1.56 MG/DL (ref 0.55–1.3)
GFR SERPL CREATININE-BSD FRML MDRD: 40.7 ML/MIN/{1.73_M2} (ref 60–?)
GLUCOSE SERPL-MCNC: 377 MG/DL (ref 60–100)
HCT VFR BLD AUTO: 26.9 % (ref 36–47)
HGB BLD-MCNC: 8.4 G/DL (ref 12–15.5)
MAGNESIUM SERPL-MCNC: 2.2 MG/DL (ref 1.8–2.4)
MCH RBC QN AUTO: 29.3 PG (ref 27–33)
MCHC RBC AUTO-ENTMCNC: 31.2 G/DL (ref 32–36.5)
MCV RBC AUTO: 93.7 FL (ref 80–96)
PHOSPHATE SERPL-MCNC: 3.9 MG/DL (ref 2.5–4.9)
PLATELET # BLD AUTO: 603 10^3/UL (ref 150–450)
POTASSIUM SERPL-SCNC: 4.7 MMOL/L (ref 3.5–5.1)
PROT SERPL-MCNC: 6.8 G/DL (ref 5.7–8.2)
RBC # BLD AUTO: 2.87 10^6/UL (ref 4–5.4)
SODIUM SERPL-SCNC: 135 MMOL/L (ref 136–145)
WBC # BLD AUTO: 7.5 10^3/UL (ref 4–10)

## 2025-01-03 ENCOUNTER — HOSPITAL ENCOUNTER (OUTPATIENT)
Dept: HOSPITAL 53 - M SFHCPLAZ | Age: 35
End: 2025-01-03
Attending: FAMILY MEDICINE
Payer: MEDICARE

## 2025-01-03 DIAGNOSIS — Z79.899: ICD-10-CM

## 2025-01-03 DIAGNOSIS — F50.20: Primary | ICD-10-CM

## 2025-01-03 LAB
ALBUMIN SERPL BCG-MCNC: 3.6 G/DL (ref 3.2–5.2)
ALP SERPL-CCNC: 121 U/L (ref 35–104)
ALT SERPL W P-5'-P-CCNC: 35 U/L (ref 7–40)
APPEARANCE UR: CLEAR
AST SERPL-CCNC: 19 U/L (ref ?–34)
BACTERIA UR QL AUTO: NEGATIVE
BASOPHILS # BLD AUTO: 0.1 10^3/UL (ref 0–0.2)
BASOPHILS NFR BLD AUTO: 1 % (ref 0–1)
BILIRUB SERPL-MCNC: 0.2 MG/DL (ref 0.3–1.2)
BILIRUB UR QL STRIP.AUTO: NEGATIVE
BUN SERPL-MCNC: 28 MG/DL (ref 9–23)
CALCIUM SERPL-MCNC: 10 MG/DL (ref 8.5–10.1)
CHLORIDE SERPL-SCNC: 97 MMOL/L (ref 98–107)
CO2 SERPL-SCNC: 34 MMOL/L (ref 20–31)
CREAT SERPL-MCNC: 1.51 MG/DL (ref 0.55–1.3)
EOSINOPHIL # BLD AUTO: 1.4 10^3/UL (ref 0–0.5)
EOSINOPHIL NFR BLD AUTO: 16.4 % (ref 0–3)
GFR SERPL CREATININE-BSD FRML MDRD: 42.2 ML/MIN/{1.73_M2} (ref 60–?)
GLUCOSE SERPL-MCNC: 284 MG/DL (ref 60–100)
GLUCOSE UR QL STRIP.AUTO: (no result) MG/DL
HCT VFR BLD AUTO: 30.4 % (ref 36–47)
HGB BLD-MCNC: 9.2 G/DL (ref 12–15.5)
HGB UR QL STRIP.AUTO: NEGATIVE
KETONES UR QL STRIP.AUTO: NEGATIVE MG/DL
LEUKOCYTE ESTERASE UR QL STRIP.AUTO: NEGATIVE
LYMPHOCYTES # BLD AUTO: 1.7 10^3/UL (ref 1.5–5)
LYMPHOCYTES NFR BLD AUTO: 19.1 % (ref 24–44)
MAGNESIUM SERPL-MCNC: 2.5 MG/DL (ref 1.8–2.4)
MCH RBC QN AUTO: 29.5 PG (ref 27–33)
MCHC RBC AUTO-ENTMCNC: 30.3 G/DL (ref 32–36.5)
MCV RBC AUTO: 97.4 FL (ref 80–96)
MONOCYTES # BLD AUTO: 0.5 10^3/UL (ref 0–0.8)
MONOCYTES NFR BLD AUTO: 6.2 % (ref 2–8)
NEUTROPHILS # BLD AUTO: 5 10^3/UL (ref 1.5–8.5)
NEUTROPHILS NFR BLD AUTO: 57 % (ref 36–66)
NITRITE UR QL STRIP.AUTO: NEGATIVE
PH UR STRIP.AUTO: 8 UNITS (ref 5–9)
PHOSPHATE SERPL-MCNC: 2.4 MG/DL (ref 2.5–4.9)
PLATELET # BLD AUTO: 460 10^3/UL (ref 150–450)
POTASSIUM SERPL-SCNC: 5.1 MMOL/L (ref 3.5–5.1)
PROT SERPL-MCNC: 7 G/DL (ref 5.7–8.2)
PROT UR QL STRIP.AUTO: NEGATIVE MG/DL
RBC # BLD AUTO: 3.12 10^6/UL (ref 4–5.4)
RBC # UR AUTO: 0 /HPF (ref 0–3)
SODIUM SERPL-SCNC: 138 MMOL/L (ref 136–145)
SP GR UR STRIP.AUTO: 1.01 (ref 1–1.03)
SQUAMOUS #/AREA URNS AUTO: 1 /HPF (ref 0–6)
UROBILINOGEN UR QL STRIP.AUTO: 0.2 MG/DL (ref 0–2)
WBC # BLD AUTO: 8.8 10^3/UL (ref 4–10)
WBC #/AREA URNS AUTO: 1 /HPF (ref 0–3)

## 2025-04-05 ENCOUNTER — HOSPITAL ENCOUNTER (INPATIENT)
Dept: HOSPITAL 53 - M ED | Age: 35
LOS: 3 days | Discharge: HOME | DRG: 872 | End: 2025-04-08
Attending: STUDENT IN AN ORGANIZED HEALTH CARE EDUCATION/TRAINING PROGRAM | Admitting: STUDENT IN AN ORGANIZED HEALTH CARE EDUCATION/TRAINING PROGRAM
Payer: MEDICARE

## 2025-04-05 VITALS — HEIGHT: 60 IN | WEIGHT: 115.3 LBS | BODY MASS INDEX: 22.64 KG/M2

## 2025-04-05 DIAGNOSIS — A40.9: Primary | ICD-10-CM

## 2025-04-05 DIAGNOSIS — N13.30: ICD-10-CM

## 2025-04-05 DIAGNOSIS — N17.9: ICD-10-CM

## 2025-04-05 DIAGNOSIS — D84.9: ICD-10-CM

## 2025-04-05 DIAGNOSIS — F41.9: ICD-10-CM

## 2025-04-05 DIAGNOSIS — Z88.6: ICD-10-CM

## 2025-04-05 DIAGNOSIS — F32.A: ICD-10-CM

## 2025-04-05 DIAGNOSIS — Z79.4: ICD-10-CM

## 2025-04-05 DIAGNOSIS — E83.52: ICD-10-CM

## 2025-04-05 DIAGNOSIS — N18.9: ICD-10-CM

## 2025-04-05 DIAGNOSIS — N39.0: ICD-10-CM

## 2025-04-05 DIAGNOSIS — K21.9: ICD-10-CM

## 2025-04-05 DIAGNOSIS — D50.9: ICD-10-CM

## 2025-04-05 DIAGNOSIS — Z79.899: ICD-10-CM

## 2025-04-05 DIAGNOSIS — Z79.52: ICD-10-CM

## 2025-04-05 DIAGNOSIS — M06.9: ICD-10-CM

## 2025-04-05 DIAGNOSIS — Z94.0: ICD-10-CM

## 2025-04-05 DIAGNOSIS — E10.22: ICD-10-CM

## 2025-04-05 DIAGNOSIS — D63.1: ICD-10-CM

## 2025-04-05 DIAGNOSIS — E87.3: ICD-10-CM

## 2025-04-05 LAB
ALBUMIN SERPL BCG-MCNC: 2.5 G/DL (ref 3.2–5.2)
ALP SERPL-CCNC: 139 U/L (ref 35–104)
ALT SERPL W P-5'-P-CCNC: 12 U/L (ref 7–40)
AST SERPL-CCNC: 10 U/L (ref ?–34)
BASOPHILS # BLD AUTO: 0.1 10^3/UL (ref 0–0.2)
BASOPHILS NFR BLD AUTO: 0.4 % (ref 0–1)
BILIRUB CONJ SERPL-MCNC: < 0.1 MG/DL (ref ?–0.4)
BILIRUB SERPL-MCNC: 0.2 MG/DL (ref 0.3–1.2)
BUN SERPL-MCNC: 19 MG/DL (ref 9–23)
CALCIUM SERPL-MCNC: 10.3 MG/DL (ref 8.5–10.1)
CHLORIDE SERPL-SCNC: 90 MMOL/L (ref 98–107)
CO2 SERPL-SCNC: 35 MMOL/L (ref 20–31)
CREAT SERPL-MCNC: 1.72 MG/DL (ref 0.55–1.3)
CRP SERPL-MCNC: 18.76 MG/DL (ref ?–1)
EOSINOPHIL # BLD AUTO: 0.4 10^3/UL (ref 0–0.5)
EOSINOPHIL NFR BLD AUTO: 2.5 % (ref 0–3)
GFR SERPL CREATININE-BSD FRML MDRD: 39.6 ML/MIN/{1.73_M2} (ref 60–?)
GLUCOSE SERPL-MCNC: 285 MG/DL (ref 60–100)
HCT VFR BLD AUTO: 27.2 % (ref 36–47)
KETONES UR STRIP.AUTO-MCNC: NEGATIVE MG/DL
LEUKOCYTE ESTERASE UR QL STRIP.AUTO: (no result)
LYMPHOCYTES # BLD AUTO: 1.5 10^3/UL (ref 1.5–5)
LYMPHOCYTES NFR BLD AUTO: 8.9 % (ref 24–44)
MCH RBC QN AUTO: 29.8 PG (ref 27–33)
MCHC RBC AUTO-ENTMCNC: 33.1 G/DL (ref 32–36.5)
MCV RBC AUTO: 90.1 FL (ref 80–96)
MONOCYTES # BLD AUTO: 1.2 10^3/UL (ref 0–0.8)
MONOCYTES NFR BLD AUTO: 6.9 % (ref 2–8)
NEUTROPHILS # BLD AUTO: 13.7 10^3/UL (ref 1.5–8.5)
NEUTROPHILS NFR BLD AUTO: 80.6 % (ref 36–66)
NITRITE UR QL STRIP.AUTO: NEGATIVE
PLATELET # BLD AUTO: 652 10^3/UL (ref 150–450)
POTASSIUM SERPL-SCNC: 4.4 MMOL/L (ref 3.5–5.1)
PROCALCITONIN SERPL-MCNC: 0.33 NG/ML
PROT SERPL-MCNC: 6.9 G/DL (ref 5.7–8.2)
RBC # BLD AUTO: 3.02 10^6/UL (ref 4–5.4)
RBC # UR AUTO: 1 /HPF (ref 0–3)
SODIUM SERPL-SCNC: 133 MMOL/L (ref 136–145)
SQUAMOUS UR QL AUTO: 2 /HPF (ref 0–6)
WBC UR QL AUTO: (no result) /HPF (ref 0–3)

## 2025-04-06 VITALS — DIASTOLIC BLOOD PRESSURE: 86 MMHG | OXYGEN SATURATION: 94 % | TEMPERATURE: 98.8 F | SYSTOLIC BLOOD PRESSURE: 119 MMHG

## 2025-04-06 VITALS — DIASTOLIC BLOOD PRESSURE: 87 MMHG | TEMPERATURE: 98.1 F | OXYGEN SATURATION: 92 % | SYSTOLIC BLOOD PRESSURE: 121 MMHG

## 2025-04-06 LAB
CREAT SERPL-MCNC: 1.72 MG/DL (ref 0.55–1.3)
GFR SERPL CREATININE-BSD FRML MDRD: 39.6 ML/MIN/{1.73_M2} (ref 60–?)
HCT VFR BLD AUTO: 26.6 % (ref 36–47)
HGB BLD-MCNC: 8.7 G/DL (ref 12–15.5)
IRON SATN MFR SERPL: 5.6 % (ref 13.2–45)
MCHC RBC AUTO-ENTMCNC: 32.7 G/DL (ref 32–36.5)
MCV RBC AUTO: 91.7 FL (ref 80–96)
PLATELET # BLD AUTO: 681 10^3/UL (ref 150–450)
POTASSIUM SERPL-SCNC: 4.7 MMOL/L (ref 3.5–5.1)
WBC # BLD AUTO: 14.1 10^3/UL (ref 4–10)

## 2025-04-06 RX ADMIN — DULOXETINE HYDROCHLORIDE SCH MG: 30 CAPSULE, DELAYED RELEASE ORAL at 20:11

## 2025-04-07 VITALS — DIASTOLIC BLOOD PRESSURE: 87 MMHG | OXYGEN SATURATION: 95 % | SYSTOLIC BLOOD PRESSURE: 117 MMHG | TEMPERATURE: 98.1 F

## 2025-04-07 VITALS — SYSTOLIC BLOOD PRESSURE: 117 MMHG | DIASTOLIC BLOOD PRESSURE: 84 MMHG | OXYGEN SATURATION: 91 % | TEMPERATURE: 98.1 F

## 2025-04-07 VITALS — SYSTOLIC BLOOD PRESSURE: 116 MMHG | DIASTOLIC BLOOD PRESSURE: 83 MMHG | TEMPERATURE: 98.8 F | OXYGEN SATURATION: 95 %

## 2025-04-07 LAB
CALCIUM SERPL-MCNC: 8.8 MG/DL (ref 8.5–10.1)
CREAT SERPL-MCNC: 1.51 MG/DL (ref 0.55–1.3)
GFR SERPL CREATININE-BSD FRML MDRD: 46.2 ML/MIN/{1.73_M2} (ref 60–?)
HCT VFR BLD AUTO: 24.1 % (ref 36–47)
HGB BLD-MCNC: 7.8 G/DL (ref 12–15.5)
MCH RBC QN AUTO: 29.9 PG (ref 27–33)
MCHC RBC AUTO-ENTMCNC: 32.4 G/DL (ref 32–36.5)
MCV RBC AUTO: 92.3 FL (ref 80–96)
PLATELET # BLD AUTO: 672 10^3/UL (ref 150–450)
POTASSIUM SERPL-SCNC: 4.3 MMOL/L (ref 3.5–5.1)
RBC # BLD AUTO: 2.61 10^6/UL (ref 4–5.4)
WBC # BLD AUTO: 13.7 10^3/UL (ref 4–10)

## 2025-04-08 VITALS — DIASTOLIC BLOOD PRESSURE: 87 MMHG | TEMPERATURE: 97.9 F | OXYGEN SATURATION: 97 % | SYSTOLIC BLOOD PRESSURE: 118 MMHG

## 2025-04-08 VITALS — SYSTOLIC BLOOD PRESSURE: 134 MMHG | OXYGEN SATURATION: 97 % | DIASTOLIC BLOOD PRESSURE: 83 MMHG | TEMPERATURE: 98.4 F

## 2025-04-08 LAB
CALCIUM SERPL-MCNC: 9.2 MG/DL (ref 8.5–10.1)
CREAT SERPL-MCNC: 1.45 MG/DL (ref 0.55–1.3)
GFR SERPL CREATININE-BSD FRML MDRD: 48.5 ML/MIN/{1.73_M2} (ref 60–?)
HCT VFR BLD AUTO: 25.9 % (ref 36–47)
HGB BLD-MCNC: 8.2 G/DL (ref 12–15.5)
MCH RBC QN AUTO: 29.6 PG (ref 27–33)
MCHC RBC AUTO-ENTMCNC: 31.7 G/DL (ref 32–36.5)
MCV RBC AUTO: 93.5 FL (ref 80–96)
PLATELET # BLD AUTO: 760 10^3/UL (ref 150–450)
POTASSIUM SERPL-SCNC: 5.1 MMOL/L (ref 3.5–5.1)
RBC # BLD AUTO: 2.77 10^6/UL (ref 4–5.4)
WBC # BLD AUTO: 11.7 10^3/UL (ref 4–10)

## 2025-05-08 ENCOUNTER — HOSPITAL ENCOUNTER (OUTPATIENT)
Dept: HOSPITAL 53 - M SFHCPLAZ | Age: 35
End: 2025-05-08
Attending: FAMILY MEDICINE
Payer: MEDICARE

## 2025-05-08 DIAGNOSIS — N12: Primary | ICD-10-CM

## 2025-05-08 LAB
APPEARANCE UR: CLEAR
BACTERIA UR QL AUTO: NEGATIVE
BILIRUB UR QL STRIP.AUTO: NEGATIVE
CAOX CRY URNS QL MICRO: (no result)
GLUCOSE UR QL STRIP.AUTO: (no result) MG/DL
HGB UR QL STRIP.AUTO: NEGATIVE
KETONES UR QL STRIP.AUTO: NEGATIVE MG/DL
LEUKOCYTE ESTERASE UR QL STRIP.AUTO: NEGATIVE
NITRITE UR QL STRIP.AUTO: NEGATIVE
PROT UR QL STRIP.AUTO: (no result) MG/DL
RBC # UR AUTO: 1 /HPF (ref 0–3)
SP GR UR STRIP.AUTO: 1.02 (ref 1–1.03)
SQUAMOUS #/AREA URNS AUTO: 2 /HPF (ref 0–6)
UROBILINOGEN UR QL STRIP.AUTO: 0.2 MG/DL (ref 0–2)
WBC #/AREA URNS AUTO: 3 /HPF (ref 0–3)

## 2025-07-07 ENCOUNTER — HOSPITAL ENCOUNTER (OUTPATIENT)
Dept: HOSPITAL 53 - M LAB | Age: 35
End: 2025-07-07
Attending: INTERNAL MEDICINE
Payer: MEDICARE

## 2025-07-07 DIAGNOSIS — Z79.899: ICD-10-CM

## 2025-07-07 DIAGNOSIS — Z94.0: Primary | ICD-10-CM

## 2025-07-07 LAB
ALBUMIN SERPL BCG-MCNC: 3.6 G/DL (ref 3.2–5.2)
ALP SERPL-CCNC: 106 U/L (ref 35–104)
ALT SERPL W P-5'-P-CCNC: 45 U/L (ref 7–40)
AMORPH SED URNS QL MICRO: (no result)
AMORPH URATE CRY URNS QL MICRO: (no result)
APPEARANCE UR: (no result)
AST SERPL-CCNC: 31 U/L (ref ?–34)
BACTERIA UR QL AUTO: (no result)
BASOPHILS # BLD AUTO: 0.1 10^3/UL (ref 0–0.2)
BASOPHILS NFR BLD AUTO: 0.9 % (ref 0–1)
BILIRUB SERPL-MCNC: 0.2 MG/DL (ref 0.3–1.2)
BILIRUB UR QL STRIP.AUTO: NEGATIVE
BUN SERPL-MCNC: 42 MG/DL (ref 9–23)
CALCIUM SERPL-MCNC: 11 MG/DL (ref 8.5–10.1)
CAOX CRY URNS QL MICRO: (no result)
CHLORIDE SERPL-SCNC: 88 MMOL/L (ref 98–107)
CHOLEST SERPL-MCNC: 282 MG/DL (ref ?–200)
CHOLEST/HDLC SERPL: 5.13 {RATIO} (ref ?–5)
CO2 SERPL-SCNC: 35 MMOL/L (ref 20–31)
COLOR UR AUTO: YELLOW
CREAT SERPL-MCNC: 2.37 MG/DL (ref 0.55–1.3)
CREAT UR-MCNC: 148.7 MG/DL
EOSINOPHIL # BLD AUTO: 1 10^3/UL (ref 0–0.5)
EOSINOPHIL NFR BLD AUTO: 12 % (ref 0–3)
GFR SERPL CREATININE-BSD FRML MDRD: 26.9 ML/MIN/{1.73_M2} (ref 60–?)
GLUCOSE SERPL-MCNC: 284 MG/DL (ref 60–100)
GLUCOSE UR QL STRIP.AUTO: (no result) MG/DL
GRAN CASTS URNS QL MICRO: (no result) /LPF
HCT VFR BLD AUTO: 33.7 % (ref 36–47)
HDLC SERPL-MCNC: 54.9 MG/DL (ref 40–?)
HGB BLD-MCNC: 11.3 G/DL (ref 12–15.5)
HGB UR QL STRIP.AUTO: NEGATIVE
KETONES UR QL STRIP.AUTO: NEGATIVE MG/DL
LDLC SERPL CALC-MCNC: (no result) MG/DL (ref ?–100)
LEUKOCYTE ESTERASE UR QL STRIP.AUTO: (no result)
LYMPHOCYTES # BLD AUTO: 2.9 10^3/UL (ref 1.5–5)
LYMPHOCYTES NFR BLD AUTO: 35.5 % (ref 24–44)
MAGNESIUM SERPL-MCNC: 2.4 MG/DL (ref 1.8–2.4)
MCH RBC QN AUTO: 32.9 PG (ref 27–33)
MCHC RBC AUTO-ENTMCNC: 33.5 G/DL (ref 32–36.5)
MCV RBC AUTO: 98.3 FL (ref 80–96)
MONOCYTES # BLD AUTO: 0.6 10^3/UL (ref 0–0.8)
MONOCYTES NFR BLD AUTO: 7.4 % (ref 2–8)
MUCOUS THREADS URNS QL MICRO: (no result)
NEUTROPHILS # BLD AUTO: 3.6 10^3/UL (ref 1.5–8.5)
NEUTROPHILS NFR BLD AUTO: 44.1 % (ref 36–66)
NITRITE UR QL STRIP.AUTO: NEGATIVE
NONHDLC SERPL-MCNC: 227.1 MG/DL
PH UR STRIP.AUTO: 6 UNITS (ref 5–9)
PHOSPHATE SERPL-MCNC: 3.7 MG/DL (ref 2.5–4.9)
PLATELET # BLD AUTO: 454 10^3/UL (ref 150–450)
POTASSIUM SERPL-SCNC: 4.4 MMOL/L (ref 3.5–5.1)
PROT SERPL-MCNC: 7.1 G/DL (ref 5.7–8.2)
PROT UR QL STRIP.AUTO: (no result) MG/DL
PROT UR-MCNC: 83.3 MG/DL (ref 0–14)
RBC # BLD AUTO: 3.43 10^6/UL (ref 4–5.4)
RBC # UR AUTO: 4 /HPF (ref 0–3)
RENAL EPI CELLS #/AREA URNS HPF: (no result) /HPF
SODIUM SERPL-SCNC: 133 MMOL/L (ref 136–145)
SP GR UR STRIP.AUTO: 1.02 (ref 1–1.03)
SQUAMOUS #/AREA URNS AUTO: 18 /HPF (ref 0–6)
TRANS CELLS #/AREA URNS HPF: (no result) /HPF
TRI-PHOS CRY URNS QL MICRO: (no result)
TRIGL SERPL-MCNC: 666 MG/DL (ref ?–150)
UROBILINOGEN UR QL STRIP.AUTO: 0.2 MG/DL (ref 0–2)
WAXY CASTS #/AREA UR COMP ASSIST: (no result) /LPF
WBC # BLD AUTO: 8.2 10^3/UL (ref 4–10)
WBC #/AREA URNS AUTO: (no result) /HPF (ref ?–1)
WBC #/AREA URNS AUTO: 6 /HPF (ref 0–3)
YEAST UR QL AUTO: (no result)

## 2025-07-08 LAB — SIROLIMUS BLD-MCNC: 2.5 NG/ML (ref 6.5–15)

## 2025-07-09 ENCOUNTER — HOSPITAL ENCOUNTER (OUTPATIENT)
Dept: HOSPITAL 53 - M LAB REF | Age: 35
End: 2025-07-09
Payer: MEDICARE

## 2025-07-09 DIAGNOSIS — A04.72: ICD-10-CM

## 2025-07-09 DIAGNOSIS — R19.7: Primary | ICD-10-CM

## 2025-07-25 ENCOUNTER — HOSPITAL ENCOUNTER (OUTPATIENT)
Dept: HOSPITAL 53 - M SFHCPLAZ | Age: 35
End: 2025-07-25
Attending: FAMILY MEDICINE
Payer: MEDICARE

## 2025-07-25 DIAGNOSIS — Z12.4: Primary | ICD-10-CM

## 2025-07-25 PROCEDURE — 87624 HPV HI-RISK TYP POOLED RSLT: CPT

## 2025-07-29 LAB — HPV E6+E7 MRNA CVX QL NAA+PROBE: DETECTED
